# Patient Record
Sex: FEMALE | Race: WHITE | Employment: PART TIME | URBAN - METROPOLITAN AREA
[De-identification: names, ages, dates, MRNs, and addresses within clinical notes are randomized per-mention and may not be internally consistent; named-entity substitution may affect disease eponyms.]

---

## 2017-02-04 ENCOUNTER — TELEPHONE (OUTPATIENT)
Dept: OBGYN CLINIC | Facility: CLINIC | Age: 22
End: 2017-02-04

## 2017-02-04 RX ORDER — NORETHINDRONE ACETATE AND ETHINYL ESTRADIOL 1.5-30(21)
1 KIT ORAL DAILY
Qty: 1 PACKAGE | Refills: 2 | Status: SHIPPED | OUTPATIENT
Start: 2017-02-04 | End: 2017-06-21

## 2017-02-04 NOTE — TELEPHONE ENCOUNTER
Paged by patient because she is out of birth control pills. Was supposed to start new packet 6 days ago. Has not had intercourse since her last withdrawal bleed 2 weeks ago. Pt also reports ongoing irregular bleeding and missing pills.    I advised pa

## 2017-03-18 ENCOUNTER — OFFICE VISIT (OUTPATIENT)
Dept: FAMILY MEDICINE CLINIC | Facility: CLINIC | Age: 22
End: 2017-03-18

## 2017-03-18 VITALS
BODY MASS INDEX: 40.67 KG/M2 | DIASTOLIC BLOOD PRESSURE: 82 MMHG | TEMPERATURE: 98 F | HEART RATE: 80 BPM | WEIGHT: 221 LBS | SYSTOLIC BLOOD PRESSURE: 130 MMHG | HEIGHT: 62 IN

## 2017-03-18 DIAGNOSIS — B07.9 WARTS OF FOOT: Primary | ICD-10-CM

## 2017-03-18 PROCEDURE — 99213 OFFICE O/P EST LOW 20 MIN: CPT | Performed by: FAMILY MEDICINE

## 2017-03-18 NOTE — PATIENT INSTRUCTIONS
Understanding Plantar Warts    A plantar wart is a small noncancerous growth on the bottom of the foot. Plantar warts often develop where friction or pressure occurs, such as on the ball of the foot. The word plantar refers to the sole of the foot.  Joe Often your healthcare provider will cut away dead parts of the wart before also using one of these other treatments.    When should I call my healthcare provider?   Call your healthcare provider if you have plantar warts that become too painful and do not g · medicines that treat or prevent blood clots like warfarin  · methotrexate  · pyrazinamide  · some medicines for diabetes  · some medicines for gout  · steroid medicines like prednisone or cortisone  What if I miss a dose?   If you miss a dose, use it as s NOTE:This sheet is a summary. It may not cover all possible information. If you have questions about this medicine, talk to your doctor, pharmacist, or health care provider.  Copyright© 2016 [employer]

## 2017-03-18 NOTE — PROGRESS NOTES
HPI:    Patient ID: Concetta Hayden is a 24year old female. Warts  This is a chronic problem. The current episode started more than 1 year ago. The problem occurs constantly. The problem has been unchanged. Associated symptoms include a rash.  Pertinent

## 2017-04-29 ENCOUNTER — OFFICE VISIT (OUTPATIENT)
Dept: FAMILY MEDICINE CLINIC | Facility: CLINIC | Age: 22
End: 2017-04-29

## 2017-04-29 VITALS
TEMPERATURE: 98 F | WEIGHT: 223 LBS | HEART RATE: 81 BPM | BODY MASS INDEX: 41 KG/M2 | DIASTOLIC BLOOD PRESSURE: 87 MMHG | SYSTOLIC BLOOD PRESSURE: 129 MMHG

## 2017-04-29 DIAGNOSIS — J01.00 ACUTE MAXILLARY SINUSITIS, RECURRENCE NOT SPECIFIED: Primary | ICD-10-CM

## 2017-04-29 PROCEDURE — 99213 OFFICE O/P EST LOW 20 MIN: CPT | Performed by: FAMILY MEDICINE

## 2017-04-29 PROCEDURE — 99212 OFFICE O/P EST SF 10 MIN: CPT | Performed by: FAMILY MEDICINE

## 2017-04-29 RX ORDER — AMOXICILLIN AND CLAVULANATE POTASSIUM 875; 125 MG/1; MG/1
1 TABLET, FILM COATED ORAL 2 TIMES DAILY
Qty: 14 TABLET | Refills: 0 | Status: SHIPPED | OUTPATIENT
Start: 2017-04-29 | End: 2017-05-09

## 2017-04-29 NOTE — PROGRESS NOTES
HPI:    Patient ID: Kiki Cuevas is a 24year old female. HPI  Patient presents with:  Cold: c/o sore throat, cough, and congestion      Review of Systems   Constitutional: Negative.     HENT: Positive for congestion, postnasal drip and sinus pressure

## 2017-06-21 ENCOUNTER — OFFICE VISIT (OUTPATIENT)
Dept: OBGYN CLINIC | Facility: CLINIC | Age: 22
End: 2017-06-21

## 2017-06-21 VITALS
HEART RATE: 80 BPM | BODY MASS INDEX: 42 KG/M2 | WEIGHT: 231 LBS | SYSTOLIC BLOOD PRESSURE: 123 MMHG | DIASTOLIC BLOOD PRESSURE: 85 MMHG

## 2017-06-21 DIAGNOSIS — N91.2 AMENORRHEA: ICD-10-CM

## 2017-06-21 DIAGNOSIS — N92.6 IRREGULAR MENSES: Primary | ICD-10-CM

## 2017-06-21 PROCEDURE — 99213 OFFICE O/P EST LOW 20 MIN: CPT | Performed by: ADVANCED PRACTICE MIDWIFE

## 2017-06-21 PROCEDURE — 81025 URINE PREGNANCY TEST: CPT | Performed by: ADVANCED PRACTICE MIDWIFE

## 2017-06-21 RX ORDER — MEDROXYPROGESTERONE ACETATE 10 MG/1
10 TABLET ORAL DAILY
Qty: 5 TABLET | Refills: 0 | Status: SHIPPED | OUTPATIENT
Start: 2017-06-21 | End: 2017-06-26

## 2017-06-21 NOTE — PROGRESS NOTES
Pt is a 25 y/o who presents with amenorrhea x 7 weeks. Pt is currently not sexually active. Pt stopped taking OCPs in 2/17. OCPs were rx for irregular cycles. Pt has stopped seeing Dr Lucille Bryan for weight management.   Discussed with pt use of Provera for

## 2017-06-22 ENCOUNTER — APPOINTMENT (OUTPATIENT)
Dept: LAB | Age: 22
End: 2017-06-22
Attending: FAMILY MEDICINE
Payer: COMMERCIAL

## 2017-06-22 ENCOUNTER — TELEPHONE (OUTPATIENT)
Dept: OBGYN CLINIC | Facility: CLINIC | Age: 22
End: 2017-06-22

## 2017-06-22 ENCOUNTER — OFFICE VISIT (OUTPATIENT)
Dept: FAMILY MEDICINE CLINIC | Facility: CLINIC | Age: 22
End: 2017-06-22

## 2017-06-22 VITALS
BODY MASS INDEX: 34.56 KG/M2 | WEIGHT: 228 LBS | TEMPERATURE: 98 F | DIASTOLIC BLOOD PRESSURE: 92 MMHG | HEIGHT: 68 IN | SYSTOLIC BLOOD PRESSURE: 128 MMHG | HEART RATE: 77 BPM

## 2017-06-22 DIAGNOSIS — N91.1 AMENORRHEA, SECONDARY: Primary | ICD-10-CM

## 2017-06-22 DIAGNOSIS — L83 ACANTHOSIS NIGRICANS: ICD-10-CM

## 2017-06-22 DIAGNOSIS — E66.9 OBESITY (BMI 30-39.9): ICD-10-CM

## 2017-06-22 PROCEDURE — 99214 OFFICE O/P EST MOD 30 MIN: CPT | Performed by: FAMILY MEDICINE

## 2017-06-22 PROCEDURE — 99212 OFFICE O/P EST SF 10 MIN: CPT | Performed by: FAMILY MEDICINE

## 2017-06-22 NOTE — TELEPHONE ENCOUNTER
Left message to check if patient picked up Provera and if she has any questions on use. If patient calls back to take 10 mg x 5 days and then should expect menses within 2 weeks.   When menses occurs patient can call for an OCP rx that will regulate her cy

## 2017-06-22 NOTE — PROGRESS NOTES
HPI:    Patient ID: Jose Enrique Pérez is a 24year old female. HPI     Patient here for follow up and to discuss no menses.   Started ocps in oct 2016 and then stopped in Feb 2017 as she was having menses 2 x month    Also was feeling dizzy and nauseous on placed in this encounter.        Meds This Visit:  No prescriptions requested or ordered in this encounter    Imaging & Referrals:  None       CK#[id number]

## 2017-06-23 ENCOUNTER — APPOINTMENT (OUTPATIENT)
Dept: LAB | Age: 22
End: 2017-06-23
Attending: FAMILY MEDICINE
Payer: COMMERCIAL

## 2017-06-23 DIAGNOSIS — E66.9 OBESITY (BMI 30-39.9): ICD-10-CM

## 2017-06-23 DIAGNOSIS — N91.1 AMENORRHEA, SECONDARY: ICD-10-CM

## 2017-06-23 PROCEDURE — 80048 BASIC METABOLIC PNL TOTAL CA: CPT

## 2017-06-23 PROCEDURE — 84703 CHORIONIC GONADOTROPIN ASSAY: CPT

## 2017-06-23 PROCEDURE — 36415 COLL VENOUS BLD VENIPUNCTURE: CPT

## 2017-06-23 PROCEDURE — 83525 ASSAY OF INSULIN: CPT

## 2017-06-23 PROCEDURE — 83036 HEMOGLOBIN GLYCOSYLATED A1C: CPT

## 2017-06-23 PROCEDURE — 84443 ASSAY THYROID STIM HORMONE: CPT

## 2017-06-23 NOTE — TELEPHONE ENCOUNTER
Spoke to pt. She has not filled her provera rx. yet. She states she would like to wait a week or 2 just in case she gets her period on its own. Pt saw her pcp today. Pt aware of MES recs & orders.  Pt verbalizes an understanding & agrees w/ plan

## 2017-06-26 ENCOUNTER — TELEPHONE (OUTPATIENT)
Dept: FAMILY MEDICINE CLINIC | Facility: CLINIC | Age: 22
End: 2017-06-26

## 2017-06-26 PROBLEM — E88.81 INSULIN RESISTANCE: Status: ACTIVE | Noted: 2017-06-26

## 2017-06-26 PROBLEM — E88.819 INSULIN RESISTANCE: Status: ACTIVE | Noted: 2017-06-26

## 2017-06-28 NOTE — TELEPHONE ENCOUNTER
Spoke with patient (identified name and ), results reviewed and agrees with plan. Relayed ENDO number    Asking about what to do about amenorrhea-read OV notes

## 2017-06-29 NOTE — TELEPHONE ENCOUNTER
She was to take provera and then is supposed to have withdrawal bleeding. She is seeing [facility].  He can follow up with them or see gyne , Dr Watt/ DR Abreu/ DR Zackery Ventura

## 2017-07-12 NOTE — TELEPHONE ENCOUNTER
Pt informed of Dr Lit Gorman response below with understanding. Denies further questions/concerns at this time.

## 2017-08-14 ENCOUNTER — TELEPHONE (OUTPATIENT)
Dept: FAMILY MEDICINE CLINIC | Facility: CLINIC | Age: 22
End: 2017-08-14

## 2017-08-14 NOTE — TELEPHONE ENCOUNTER
pt stated that she was in on 6/22/17 since she had not had her period. She stated that you prescribed her Provera but she had inform you that she would wait a couple weeks to see if she would get her period the [address].  Pt stated that about 1 we

## 2017-08-15 NOTE — TELEPHONE ENCOUNTER
Called pt to clarify orders. Instructed pt not to take any medications, including provera. Advised pt that she needs to have quant bhcg drawn tonight or tomorrow. Order placed.  Stressed to pt that she is not to start provera until after speaking w/ our off

## 2017-08-15 NOTE — TELEPHONE ENCOUNTER
Pt states period is irregular and was prescribed pills  to get period. Pt states she waited a month before taking pills to see if period would come down regularly. Pt states it did but It was light and only lasted 2 days.  Pt wants to know if that is someth

## 2017-08-15 NOTE — TELEPHONE ENCOUNTER
Pt wants to know if she should start Provera d/t she only had 2 days of light bleeding last wk. Advised pt that MES want her to take Provera 10mg x 5 days then should expect menses within 2wks.  Advised that when her menses occur that she should call offic

## 2017-08-16 ENCOUNTER — APPOINTMENT (OUTPATIENT)
Dept: LAB | Facility: HOSPITAL | Age: 22
End: 2017-08-16
Attending: ADVANCED PRACTICE MIDWIFE
Payer: COMMERCIAL

## 2017-08-16 DIAGNOSIS — Z72.51 UNPROTECTED SEXUAL INTERCOURSE: ICD-10-CM

## 2017-08-16 LAB — B-HCG SERPL-ACNC: <0.6 MIU/ML

## 2017-08-16 PROCEDURE — 36415 COLL VENOUS BLD VENIPUNCTURE: CPT

## 2017-08-16 PROCEDURE — 84702 CHORIONIC GONADOTROPIN TEST: CPT

## 2017-08-18 ENCOUNTER — TELEPHONE (OUTPATIENT)
Dept: OBGYN CLINIC | Facility: CLINIC | Age: 22
End: 2017-08-18

## 2017-08-18 NOTE — TELEPHONE ENCOUNTER
Spoke with patient informed per MES pregnancy test is negative and is ok to start provera, patient verbalized understanding and agrees with plan.

## 2017-11-02 ENCOUNTER — LAB REQUISITION (OUTPATIENT)
Dept: LAB | Facility: HOSPITAL | Age: 22
End: 2017-11-02
Payer: COMMERCIAL

## 2017-11-02 DIAGNOSIS — N72 INFLAMMATORY DISEASE OF UTERINE CERVIX: ICD-10-CM

## 2017-11-02 PROCEDURE — 87491 CHLMYD TRACH DNA AMP PROBE: CPT | Performed by: OBSTETRICS & GYNECOLOGY

## 2017-11-02 PROCEDURE — 87591 N.GONORRHOEAE DNA AMP PROB: CPT | Performed by: OBSTETRICS & GYNECOLOGY

## 2017-11-03 ENCOUNTER — TELEPHONE (OUTPATIENT)
Dept: OBGYN CLINIC | Facility: CLINIC | Age: 22
End: 2017-11-03

## 2017-11-03 NOTE — TELEPHONE ENCOUNTER
The pt has questions regarding b/c medications. The pt is also requesting that the results from her last pap smear be faxed to Dr CELIA Campuzano at [phone]. Please advise.

## 2017-11-03 NOTE — TELEPHONE ENCOUNTER
Pt req name of meds she was on previously. Took Provera in June & was on Microgestin 1.5/30.  Pap results faxed to [facility] per pt request. Pt verbalized an understanding & agrees w/ plan

## 2017-11-09 ENCOUNTER — LAB REQUISITION (OUTPATIENT)
Dept: LAB | Facility: HOSPITAL | Age: 22
End: 2017-11-09
Payer: COMMERCIAL

## 2017-11-09 DIAGNOSIS — Z11.3 ENCOUNTER FOR SCREENING FOR INFECTIONS WITH PREDOMINANTLY SEXUAL MODE OF TRANSMISSION: ICD-10-CM

## 2017-11-09 DIAGNOSIS — N91.2 AMENORRHEA: ICD-10-CM

## 2017-11-09 PROCEDURE — 84443 ASSAY THYROID STIM HORMONE: CPT | Performed by: OBSTETRICS & GYNECOLOGY

## 2017-11-09 PROCEDURE — 87591 N.GONORRHOEAE DNA AMP PROB: CPT | Performed by: OBSTETRICS & GYNECOLOGY

## 2017-11-09 PROCEDURE — 84146 ASSAY OF PROLACTIN: CPT | Performed by: OBSTETRICS & GYNECOLOGY

## 2017-11-09 PROCEDURE — 87491 CHLMYD TRACH DNA AMP PROBE: CPT | Performed by: OBSTETRICS & GYNECOLOGY

## 2017-11-09 NOTE — TELEPHONE ENCOUNTER
Pt req names of ocp & provera & date of last pap. Previously received info but misplaced. Has appt w/ [facility] today. Info giv.  Pt verbalized an understanding & agrees w/ plan

## 2018-08-08 ENCOUNTER — LAB REQUISITION (OUTPATIENT)
Dept: LAB | Facility: HOSPITAL | Age: 23
End: 2018-08-08
Payer: COMMERCIAL

## 2018-08-08 DIAGNOSIS — E28.2 POLYCYSTIC OVARIAN SYNDROME: ICD-10-CM

## 2018-08-08 DIAGNOSIS — Z32.01 ENCOUNTER FOR PREGNANCY TEST, RESULT POSITIVE: ICD-10-CM

## 2018-08-08 LAB — RUBV IGG SER-ACNC: 29.3 IU/ML

## 2018-08-08 PROCEDURE — 86780 TREPONEMA PALLIDUM: CPT | Performed by: OBSTETRICS & GYNECOLOGY

## 2018-08-08 PROCEDURE — 87340 HEPATITIS B SURFACE AG IA: CPT | Performed by: OBSTETRICS & GYNECOLOGY

## 2018-08-08 PROCEDURE — 87389 HIV-1 AG W/HIV-1&-2 AB AG IA: CPT | Performed by: OBSTETRICS & GYNECOLOGY

## 2018-08-08 PROCEDURE — 86762 RUBELLA ANTIBODY: CPT | Performed by: OBSTETRICS & GYNECOLOGY

## 2018-08-09 LAB
HBV SURFACE AG SERPL QL IA: NONREACTIVE
HIV1+2 AB SERPL QL IA: NONREACTIVE

## 2018-08-10 LAB — T PALLIDUM AB SER QL: NEGATIVE

## 2018-08-14 ENCOUNTER — LAB REQUISITION (OUTPATIENT)
Dept: LAB | Facility: HOSPITAL | Age: 23
End: 2018-08-14
Payer: COMMERCIAL

## 2018-08-14 DIAGNOSIS — Z11.3 ENCOUNTER FOR SCREENING FOR INFECTIONS WITH PREDOMINANTLY SEXUAL MODE OF TRANSMISSION: ICD-10-CM

## 2018-08-14 DIAGNOSIS — N92.6 IRREGULAR MENSTRUATION: ICD-10-CM

## 2018-08-14 LAB
ANTIBODY SCREEN: NEGATIVE
BASOPHILS # BLD: 0.1 K/UL (ref 0–0.2)
BASOPHILS NFR BLD: 1 %
EOSINOPHIL # BLD: 0.1 K/UL (ref 0–0.7)
EOSINOPHIL NFR BLD: 2 %
ERYTHROCYTE [DISTWIDTH] IN BLOOD BY AUTOMATED COUNT: 13.3 % (ref 11–15)
HCT VFR BLD AUTO: 41.6 % (ref 35–48)
HGB BLD-MCNC: 14.1 G/DL (ref 12–16)
LYMPHOCYTES # BLD: 2 K/UL (ref 1–4)
LYMPHOCYTES NFR BLD: 21 %
MCH RBC QN AUTO: 31.5 PG (ref 27–32)
MCHC RBC AUTO-ENTMCNC: 33.9 G/DL (ref 32–37)
MCV RBC AUTO: 92.9 FL (ref 80–100)
MONOCYTES # BLD: 0.6 K/UL (ref 0–1)
MONOCYTES NFR BLD: 6 %
NEUTROPHILS # BLD AUTO: 6.8 K/UL (ref 1.8–7.7)
NEUTROPHILS NFR BLD: 70 %
PLATELET # BLD AUTO: 268 K/UL (ref 140–400)
PMV BLD AUTO: 11.2 FL (ref 7.4–10.3)
RBC # BLD AUTO: 4.48 M/UL (ref 3.7–5.4)
RH BLOOD TYPE: POSITIVE
WBC # BLD AUTO: 9.7 K/UL (ref 4–11)

## 2018-08-14 PROCEDURE — 87491 CHLMYD TRACH DNA AMP PROBE: CPT | Performed by: OBSTETRICS & GYNECOLOGY

## 2018-08-14 PROCEDURE — 86850 RBC ANTIBODY SCREEN: CPT | Performed by: OBSTETRICS & GYNECOLOGY

## 2018-08-14 PROCEDURE — 86901 BLOOD TYPING SEROLOGIC RH(D): CPT | Performed by: OBSTETRICS & GYNECOLOGY

## 2018-08-14 PROCEDURE — 86900 BLOOD TYPING SEROLOGIC ABO: CPT | Performed by: OBSTETRICS & GYNECOLOGY

## 2018-08-14 PROCEDURE — 87591 N.GONORRHOEAE DNA AMP PROB: CPT | Performed by: OBSTETRICS & GYNECOLOGY

## 2018-08-14 PROCEDURE — 85025 COMPLETE CBC W/AUTO DIFF WBC: CPT | Performed by: OBSTETRICS & GYNECOLOGY

## 2018-08-14 PROCEDURE — 83036 HEMOGLOBIN GLYCOSYLATED A1C: CPT | Performed by: OBSTETRICS & GYNECOLOGY

## 2018-08-15 LAB
C TRACH DNA SPEC QL NAA+PROBE: NEGATIVE
HBA1C MFR BLD: 5.6 % (ref 4–6)
N GONORRHOEA DNA SPEC QL NAA+PROBE: NEGATIVE

## 2018-09-13 ENCOUNTER — LAB REQUISITION (OUTPATIENT)
Dept: LAB | Facility: HOSPITAL | Age: 23
End: 2018-09-13
Payer: COMMERCIAL

## 2018-09-13 LAB — GLUCOSE 1H P 50 G GLC PO SERPL-MCNC: 153 MG/DL

## 2018-09-13 PROCEDURE — 82950 GLUCOSE TEST: CPT | Performed by: OBSTETRICS & GYNECOLOGY

## 2018-09-17 ENCOUNTER — LAB REQUISITION (OUTPATIENT)
Dept: LAB | Facility: HOSPITAL | Age: 23
End: 2018-09-17
Payer: COMMERCIAL

## 2018-09-17 DIAGNOSIS — O99.810 ABNORMAL GLUCOSE COMPLICATING PREGNANCY: ICD-10-CM

## 2018-09-17 LAB
GLUCOSE 1H P GLC SERPL-MCNC: 150 MG/DL
GLUCOSE 2H P GLC SERPL-MCNC: 167 MG/DL
GLUCOSE 3H P GLC SERPL-MCNC: 116 MG/DL
GLUCOSE P FAST SERPL-MCNC: 83 MG/DL (ref 70–99)

## 2018-09-17 PROCEDURE — 82952 GTT-ADDED SAMPLES: CPT | Performed by: OBSTETRICS & GYNECOLOGY

## 2018-09-17 PROCEDURE — 82951 GLUCOSE TOLERANCE TEST (GTT): CPT | Performed by: OBSTETRICS & GYNECOLOGY

## 2018-10-08 ENCOUNTER — LAB REQUISITION (OUTPATIENT)
Dept: LAB | Facility: HOSPITAL | Age: 23
End: 2018-10-08
Payer: COMMERCIAL

## 2018-10-08 DIAGNOSIS — Z34.01 ENCOUNTER FOR SUPERVISION OF NORMAL FIRST PREGNANCY IN FIRST TRIMESTER: ICD-10-CM

## 2018-10-08 PROCEDURE — 87086 URINE CULTURE/COLONY COUNT: CPT | Performed by: OBSTETRICS & GYNECOLOGY

## 2018-11-07 ENCOUNTER — HOSPITAL ENCOUNTER (OUTPATIENT)
Age: 23
Discharge: HOME | End: 2018-11-07
Payer: COMMERCIAL

## 2018-11-07 ENCOUNTER — APPOINTMENT (OUTPATIENT)
Dept: ULTRASOUND IMAGING | Facility: HOSPITAL | Age: 23
End: 2018-11-07
Attending: NURSE PRACTITIONER
Payer: COMMERCIAL

## 2018-11-07 ENCOUNTER — HOSPITAL ENCOUNTER (EMERGENCY)
Facility: HOSPITAL | Age: 23
Discharge: HOME | End: 2018-11-07
Payer: COMMERCIAL

## 2018-11-07 VITALS
OXYGEN SATURATION: 100 % | RESPIRATION RATE: 14 BRPM | BODY MASS INDEX: 42.48 KG/M2 | HEART RATE: 95 BPM | DIASTOLIC BLOOD PRESSURE: 80 MMHG | TEMPERATURE: 98 F | WEIGHT: 225 LBS | SYSTOLIC BLOOD PRESSURE: 117 MMHG | HEIGHT: 61 IN

## 2018-11-07 VITALS
HEART RATE: 109 BPM | BODY MASS INDEX: 42.48 KG/M2 | WEIGHT: 225 LBS | HEIGHT: 61 IN | RESPIRATION RATE: 20 BRPM | TEMPERATURE: 98 F | OXYGEN SATURATION: 98 % | DIASTOLIC BLOOD PRESSURE: 84 MMHG | SYSTOLIC BLOOD PRESSURE: 134 MMHG

## 2018-11-07 DIAGNOSIS — R06.00 DYSPNEA, UNSPECIFIED TYPE: Primary | ICD-10-CM

## 2018-11-07 DIAGNOSIS — Z34.92 SECOND TRIMESTER PREGNANCY: ICD-10-CM

## 2018-11-07 DIAGNOSIS — Z3A.18 18 WEEKS GESTATION OF PREGNANCY: ICD-10-CM

## 2018-11-07 PROCEDURE — 85379 FIBRIN DEGRADATION QUANT: CPT | Performed by: NURSE PRACTITIONER

## 2018-11-07 PROCEDURE — 99213 OFFICE O/P EST LOW 20 MIN: CPT

## 2018-11-07 PROCEDURE — 93005 ELECTROCARDIOGRAM TRACING: CPT

## 2018-11-07 PROCEDURE — 93970 EXTREMITY STUDY: CPT | Performed by: NURSE PRACTITIONER

## 2018-11-07 PROCEDURE — 99285 EMERGENCY DEPT VISIT HI MDM: CPT

## 2018-11-07 PROCEDURE — 36415 COLL VENOUS BLD VENIPUNCTURE: CPT

## 2018-11-07 PROCEDURE — 80048 BASIC METABOLIC PNL TOTAL CA: CPT | Performed by: NURSE PRACTITIONER

## 2018-11-07 PROCEDURE — 85025 COMPLETE CBC W/AUTO DIFF WBC: CPT | Performed by: NURSE PRACTITIONER

## 2018-11-07 PROCEDURE — 99202 OFFICE O/P NEW SF 15 MIN: CPT

## 2018-11-07 PROCEDURE — 93010 ELECTROCARDIOGRAM REPORT: CPT | Performed by: INTERNAL MEDICINE

## 2018-11-07 NOTE — ED NOTES
Pt to ER with c/o SOB for several days that has been accompanied by some left sided chest pain today and some anxiety. There is no cough, fever or upper respiratory infection. Denies any pain when taking a deep breath or moving.   Monitor shows ST without

## 2018-11-07 NOTE — ED PROVIDER NOTES
Patient presents with:  Dyspnea BELEM SOB (respiratory)      HPI:     This 21year old female presents with a chief complaint of intermittent shortness of breath for this past week. The patient states the symptoms have gradually become worse.   She states sh Occupational History      Not on file    Tobacco Use      Smoking status: Never Smoker      Smokeless tobacco: Never Used    Substance and Sexual Activity      Alcohol use: No        Alcohol/week: 0.0 oz      Drug use: No      Sexual activity: Yes        P and being tachycardic, I will send her to the emergency department for further evaluation. She declines an ambulance. She is with her family, and they are comfortable driving her to [facility].    Diagnosis:    ICD-10-CM    1.  Dyspnea, unspecified type R06

## 2018-11-07 NOTE — ED PROVIDER NOTES
Patient Seen in: [facility] Emergency Department    History   CC: sob  HPI: Pardeep Blower 21year old female  who presents to the ER c/o shortness of breath and difficulty taking a full deep breath which has been present for the past approximate reviewed from today and agreed except as otherwise stated in HPI. Medications :   ASPIRIN OR,  Take by mouth. Prenatal Multivit-Min-Fe-FA (PRENATAL OR),  Take by mouth. Constitutional and vital signs reviewed.         Physical Exam     ED Tri components:    D-Dimer 0.77 (*)     All other components within normal limits   CBC W/ DIFFERENTIAL - Abnormal; Notable for the following components:    WBC 14.1 (*)     MPV 10.9 (*)     Neutrophil Absolute 10.6 (*)     All other components within normal l days        Medications Prescribed:  Current Discharge Medication List

## 2018-11-13 ENCOUNTER — LAB REQUISITION (OUTPATIENT)
Dept: LAB | Facility: HOSPITAL | Age: 23
End: 2018-11-13
Payer: COMMERCIAL

## 2018-11-13 DIAGNOSIS — Z36.9 ENCOUNTER FOR ANTENATAL SCREENING: ICD-10-CM

## 2018-11-13 PROCEDURE — 81003 URINALYSIS AUTO W/O SCOPE: CPT | Performed by: OBSTETRICS & GYNECOLOGY

## 2019-01-04 ENCOUNTER — LAB REQUISITION (OUTPATIENT)
Dept: LAB | Facility: HOSPITAL | Age: 24
End: 2019-01-04
Payer: COMMERCIAL

## 2019-01-04 DIAGNOSIS — O99.810 ABNORMAL GLUCOSE COMPLICATING PREGNANCY: ICD-10-CM

## 2019-01-04 LAB
GLUCOSE 1H P GLC SERPL-MCNC: 157 MG/DL
GLUCOSE 2H P GLC SERPL-MCNC: 132 MG/DL
GLUCOSE 3H P GLC SERPL-MCNC: 109 MG/DL
GLUCOSE P FAST SERPL-MCNC: 78 MG/DL (ref 70–99)

## 2019-01-04 PROCEDURE — 82952 GTT-ADDED SAMPLES: CPT | Performed by: OBSTETRICS & GYNECOLOGY

## 2019-01-04 PROCEDURE — 82951 GLUCOSE TOLERANCE TEST (GTT): CPT | Performed by: OBSTETRICS & GYNECOLOGY

## 2019-01-12 ENCOUNTER — LAB REQUISITION (OUTPATIENT)
Dept: LAB | Facility: HOSPITAL | Age: 24
End: 2019-01-12
Payer: COMMERCIAL

## 2019-01-12 DIAGNOSIS — Z13.0 ENCOUNTER FOR SCREENING FOR DISEASES OF THE BLOOD AND BLOOD-FORMING ORGANS AND CERTAIN DISORDERS INVOLVING THE IMMUNE MECHANISM: ICD-10-CM

## 2019-01-12 LAB
BASOPHILS # BLD: 0 K/UL (ref 0–0.2)
BASOPHILS NFR BLD: 0 %
EOSINOPHIL # BLD: 0.1 K/UL (ref 0–0.7)
EOSINOPHIL NFR BLD: 1 %
ERYTHROCYTE [DISTWIDTH] IN BLOOD BY AUTOMATED COUNT: 12.9 % (ref 11–15)
HCT VFR BLD AUTO: 36.3 % (ref 35–48)
HGB BLD-MCNC: 12.2 G/DL (ref 12–16)
LYMPHOCYTES # BLD: 1.6 K/UL (ref 1–4)
LYMPHOCYTES NFR BLD: 16 %
MCH RBC QN AUTO: 31.2 PG (ref 27–32)
MCHC RBC AUTO-ENTMCNC: 33.6 G/DL (ref 32–37)
MCV RBC AUTO: 92.7 FL (ref 80–100)
MONOCYTES # BLD: 0.7 K/UL (ref 0–1)
MONOCYTES NFR BLD: 7 %
NEUTROPHILS # BLD AUTO: 7.7 K/UL (ref 1.8–7.7)
NEUTROPHILS NFR BLD: 76 %
PLATELET # BLD AUTO: 256 K/UL (ref 140–400)
PMV BLD AUTO: 10.4 FL (ref 7.4–10.3)
RBC # BLD AUTO: 3.91 M/UL (ref 3.7–5.4)
WBC # BLD AUTO: 10.1 K/UL (ref 4–11)

## 2019-01-12 PROCEDURE — 85025 COMPLETE CBC W/AUTO DIFF WBC: CPT | Performed by: OBSTETRICS & GYNECOLOGY

## 2019-02-11 ENCOUNTER — HOSPITAL ENCOUNTER (OUTPATIENT)
Facility: HOSPITAL | Age: 24
Setting detail: OBSERVATION
Discharge: HOME | End: 2019-02-12
Attending: OBSTETRICS & GYNECOLOGY | Admitting: OBSTETRICS & GYNECOLOGY
Payer: COMMERCIAL

## 2019-02-11 PROBLEM — Z34.90 PREGNANCY (HCC): Status: ACTIVE | Noted: 2019-02-11

## 2019-02-11 PROBLEM — Z34.90 PREGNANCY: Status: ACTIVE | Noted: 2019-02-11

## 2019-02-11 LAB
BILIRUB UR QL: NEGATIVE
COLOR UR: YELLOW
FIBRONECTIN FETAL SPEC QL: NEGATIVE
GLUCOSE UR-MCNC: NEGATIVE MG/DL
HGB UR QL STRIP.AUTO: NEGATIVE
KETONES UR-MCNC: 20 MG/DL
NITRITE UR QL STRIP.AUTO: NEGATIVE
PH UR: 7 [PH] (ref 5–8)
PROT UR-MCNC: NEGATIVE MG/DL
RBC #/AREA URNS AUTO: 2 /HPF
SP GR UR STRIP: 1.01 (ref 1–1.03)
UROBILINOGEN UR STRIP-ACNC: <2
VIT C UR-MCNC: NEGATIVE MG/DL
WBC #/AREA URNS AUTO: 4 /HPF

## 2019-02-11 PROCEDURE — 87653 STREP B DNA AMP PROBE: CPT | Performed by: OBSTETRICS & GYNECOLOGY

## 2019-02-11 PROCEDURE — 96372 THER/PROPH/DIAG INJ SC/IM: CPT

## 2019-02-11 PROCEDURE — 82731 ASSAY OF FETAL FIBRONECTIN: CPT | Performed by: OBSTETRICS & GYNECOLOGY

## 2019-02-11 PROCEDURE — 81001 URINALYSIS AUTO W/SCOPE: CPT | Performed by: OBSTETRICS & GYNECOLOGY

## 2019-02-11 PROCEDURE — 87081 CULTURE SCREEN ONLY: CPT | Performed by: OBSTETRICS & GYNECOLOGY

## 2019-02-11 PROCEDURE — 96360 HYDRATION IV INFUSION INIT: CPT

## 2019-02-11 RX ORDER — SODIUM CHLORIDE, SODIUM LACTATE, POTASSIUM CHLORIDE, CALCIUM CHLORIDE 600; 310; 30; 20 MG/100ML; MG/100ML; MG/100ML; MG/100ML
INJECTION, SOLUTION INTRAVENOUS
Status: COMPLETED
Start: 2019-02-11 | End: 2019-02-11

## 2019-02-11 RX ORDER — SODIUM CHLORIDE 0.9 % (FLUSH) 0.9 %
2 SYRINGE (ML) INJECTION AS NEEDED
Status: DISCONTINUED | OUTPATIENT
Start: 2019-02-11 | End: 2019-02-12

## 2019-02-11 RX ORDER — TERBUTALINE SULFATE 1 MG/ML
0.25 INJECTION, SOLUTION SUBCUTANEOUS
Status: ACTIVE | OUTPATIENT
Start: 2019-02-11 | End: 2019-02-11

## 2019-02-11 RX ORDER — 0.9 % SODIUM CHLORIDE 0.9 %
3 VIAL (ML) INJECTION AS NEEDED
Status: DISCONTINUED | OUTPATIENT
Start: 2019-02-11 | End: 2019-02-12

## 2019-02-11 RX ORDER — TERBUTALINE SULFATE 1 MG/ML
INJECTION, SOLUTION SUBCUTANEOUS
Status: COMPLETED
Start: 2019-02-11 | End: 2019-02-11

## 2019-02-11 RX ORDER — SODIUM CHLORIDE 0.9 % (FLUSH) 0.9 %
2 SYRINGE (ML) INJECTION EVERY 8 HOURS
Status: DISCONTINUED | OUTPATIENT
Start: 2019-02-11 | End: 2019-02-12

## 2019-02-12 VITALS — HEART RATE: 104 BPM | SYSTOLIC BLOOD PRESSURE: 135 MMHG | OXYGEN SATURATION: 98 % | DIASTOLIC BLOOD PRESSURE: 77 MMHG

## 2019-02-12 PROCEDURE — 59025 FETAL NON-STRESS TEST: CPT

## 2019-02-12 PROCEDURE — 99213 OFFICE O/P EST LOW 20 MIN: CPT

## 2019-02-12 NOTE — H&P
Quentin Prasad 76 Patient Status:  Observation    8/15/1995 MRN [id number]   Location [address]  Attending Chrissie Lee MD   [facility] Day # 0 PCP John Polanco MD     Date of Ad auscultation bilaterally  Heart: regular rate and rhythm, no murmur  Abdomen: estimated fetal weight: AGA  Cervix:  LTC in office      Fetal Surveillance:  Fetal heart variability: moderate  Fetal Heart Rate decelerations: none  Fetal Heart Rate accelerati

## 2019-02-12 NOTE — PROGRESS NOTES
Pt is a 21year old female admitted to TR3/TR3-A. Patient presents with:  Contractions     Pt is  32w3d intra-uterine pregnancy. History obtained, consents signed. Oriented to room, staff, and plan of care.

## 2019-02-12 NOTE — TRIAGE
[facility]      Triage Note    Pardeep Blower Patient Status:  Observation    8/15/1995 MRN [id number]   Location [address]  Attending Sagar Yousif MD   [facility] Day # 0 PCP MD Pauline Craig gestational age                                  Additional Comments       Reason for visit: pt to triage with c/o of contractions on NST at office. FFN-neg.  UA-neg. Bolus of IV fluid given and 2 doses of terbutaline given.   Pt discharged home with inst

## 2019-03-04 ENCOUNTER — LAB REQUISITION (OUTPATIENT)
Dept: LAB | Facility: HOSPITAL | Age: 24
End: 2019-03-04
Payer: COMMERCIAL

## 2019-03-04 DIAGNOSIS — Z36.9 ENCOUNTER FOR ANTENATAL SCREENING: ICD-10-CM

## 2019-03-04 PROCEDURE — 87081 CULTURE SCREEN ONLY: CPT | Performed by: OBSTETRICS & GYNECOLOGY

## 2019-03-04 PROCEDURE — 87653 STREP B DNA AMP PROBE: CPT | Performed by: OBSTETRICS & GYNECOLOGY

## 2019-03-11 ENCOUNTER — LAB REQUISITION (OUTPATIENT)
Dept: LAB | Facility: HOSPITAL | Age: 24
End: 2019-03-11
Payer: COMMERCIAL

## 2019-03-11 DIAGNOSIS — Z36.9 ENCOUNTER FOR ANTENATAL SCREENING: ICD-10-CM

## 2019-03-11 PROCEDURE — 87389 HIV-1 AG W/HIV-1&-2 AB AG IA: CPT | Performed by: OBSTETRICS & GYNECOLOGY

## 2019-03-11 PROCEDURE — 86780 TREPONEMA PALLIDUM: CPT | Performed by: OBSTETRICS & GYNECOLOGY

## 2019-03-13 LAB — T PALLIDUM AB SER QL: NEGATIVE

## 2019-03-25 ENCOUNTER — HOSPITAL ENCOUNTER (OUTPATIENT)
Facility: HOSPITAL | Age: 24
Setting detail: OBSERVATION
Discharge: HOME | End: 2019-03-25
Attending: OBSTETRICS & GYNECOLOGY | Admitting: OBSTETRICS & GYNECOLOGY
Payer: COMMERCIAL

## 2019-03-25 VITALS — HEART RATE: 77 BPM | DIASTOLIC BLOOD PRESSURE: 73 MMHG | SYSTOLIC BLOOD PRESSURE: 121 MMHG

## 2019-03-25 LAB
ALBUMIN SERPL-MCNC: 2.9 G/DL (ref 3.4–5)
ALBUMIN/GLOB SERPL: 0.6 {RATIO} (ref 1–2)
ALP LIVER SERPL-CCNC: 164 U/L (ref 52–144)
ALT SERPL-CCNC: 34 U/L (ref 13–56)
ALT SERPL-CCNC: 34 U/L (ref 13–56)
ANION GAP SERPL CALC-SCNC: 8 MMOL/L (ref 0–18)
AST SERPL-CCNC: 23 U/L (ref 15–37)
AST SERPL-CCNC: 23 U/L (ref 15–37)
BASOPHILS # BLD AUTO: 0.03 X10(3) UL (ref 0–0.2)
BASOPHILS NFR BLD AUTO: 0.3 %
BILIRUB SERPL-MCNC: 0.2 MG/DL (ref 0.1–2)
BUN BLD-MCNC: 10 MG/DL (ref 7–18)
BUN/CREAT SERPL: 14.7 (ref 10–20)
CALCIUM BLD-MCNC: 9.3 MG/DL (ref 8.5–10.1)
CHLORIDE SERPL-SCNC: 106 MMOL/L (ref 98–107)
CO2 SERPL-SCNC: 22 MMOL/L (ref 21–32)
CREAT BLD-MCNC: 0.68 MG/DL (ref 0.55–1.02)
CREAT UR-SCNC: 13.1 MG/DL
DEPRECATED RDW RBC AUTO: 42.8 FL (ref 35.1–46.3)
EOSINOPHIL # BLD AUTO: 0.08 X10(3) UL (ref 0–0.7)
EOSINOPHIL NFR BLD AUTO: 0.8 %
ERYTHROCYTE [DISTWIDTH] IN BLOOD BY AUTOMATED COUNT: 13 % (ref 11–15)
GLOBULIN PLAS-MCNC: 4.7 G/DL (ref 2.8–4.4)
GLUCOSE BLD-MCNC: 78 MG/DL (ref 70–99)
HCT VFR BLD AUTO: 37.3 % (ref 35–48)
HGB BLD-MCNC: 12.6 G/DL (ref 12–16)
IMM GRANULOCYTES # BLD AUTO: 0.03 X10(3) UL (ref 0–1)
IMM GRANULOCYTES NFR BLD: 0.3 %
LYMPHOCYTES # BLD AUTO: 1.53 X10(3) UL (ref 1–4)
LYMPHOCYTES NFR BLD AUTO: 15.3 %
M PROTEIN MFR SERPL ELPH: 7.6 G/DL (ref 6.4–8.2)
MCH RBC QN AUTO: 30.9 PG (ref 26–34)
MCHC RBC AUTO-ENTMCNC: 33.8 G/DL (ref 31–37)
MCV RBC AUTO: 91.4 FL (ref 80–100)
MONOCYTES # BLD AUTO: 0.76 X10(3) UL (ref 0.1–1)
MONOCYTES NFR BLD AUTO: 7.6 %
NEUTROPHILS # BLD AUTO: 7.59 X10 (3) UL (ref 1.5–7.7)
NEUTROPHILS # BLD AUTO: 7.59 X10(3) UL (ref 1.5–7.7)
NEUTROPHILS NFR BLD AUTO: 75.7 %
OSMOLALITY SERPL CALC.SUM OF ELEC: 280 MOSM/KG (ref 275–295)
PLATELET # BLD AUTO: 258 10(3)UL (ref 150–450)
POTASSIUM SERPL-SCNC: 3.9 MMOL/L (ref 3.5–5.1)
PROT UR-MCNC: <5 MG/DL
RBC # BLD AUTO: 4.08 X10(6)UL (ref 3.8–5.3)
SODIUM SERPL-SCNC: 136 MMOL/L (ref 136–145)
WBC # BLD AUTO: 10 X10(3) UL (ref 4–11)

## 2019-03-25 PROCEDURE — 59025 FETAL NON-STRESS TEST: CPT

## 2019-03-25 PROCEDURE — 99203 OFFICE O/P NEW LOW 30 MIN: CPT

## 2019-03-25 PROCEDURE — 82570 ASSAY OF URINE CREATININE: CPT | Performed by: OBSTETRICS & GYNECOLOGY

## 2019-03-25 PROCEDURE — 36415 COLL VENOUS BLD VENIPUNCTURE: CPT

## 2019-03-25 PROCEDURE — 84156 ASSAY OF PROTEIN URINE: CPT | Performed by: OBSTETRICS & GYNECOLOGY

## 2019-03-25 PROCEDURE — 80053 COMPREHEN METABOLIC PANEL: CPT | Performed by: OBSTETRICS & GYNECOLOGY

## 2019-03-25 PROCEDURE — 84460 ALANINE AMINO (ALT) (SGPT): CPT | Performed by: OBSTETRICS & GYNECOLOGY

## 2019-03-25 PROCEDURE — 85025 COMPLETE CBC W/AUTO DIFF WBC: CPT | Performed by: OBSTETRICS & GYNECOLOGY

## 2019-03-25 PROCEDURE — 84450 TRANSFERASE (AST) (SGOT): CPT | Performed by: OBSTETRICS & GYNECOLOGY

## 2019-03-25 NOTE — PROGRESS NOTES
Pt is a 21year old female admitted to TR4/TR4-A. Patient presents with:  Elevated BP     Pt is  38w3d intra-uterine pregnancy. History obtained, consents signed. Oriented to room, staff, and plan of care.

## 2019-03-25 NOTE — TRIAGE
[facility]      Triage Note    Josiah Cruz Patient Status:  Observation    8/15/1995 MRN [id number]   Location [address]  Attending Drea Frankel MD   [facility] Day # 0 PCP John Morris MD       Beau Gabe Baseline: 145 BPM           Uterine Irritability: No                                   Acoustic Stimulator: No           Nonstress Test Interpretation: Reactive           Nonstress Test Second Interpretation: Reactive          FHR Category: Category

## 2019-03-25 NOTE — TRIAGE
Sent from office for elevated BPs. NST reactive, initial BPs WNL. Awaiting labs and more BPs. If labs WNL and BP remain <140/90 will d/c home to f/u in office later this week as scheduled.

## 2019-04-10 ENCOUNTER — HOSPITAL ENCOUNTER (INPATIENT)
Facility: HOSPITAL | Age: 24
LOS: 5 days | Discharge: HOME | End: 2019-04-15
Attending: OBSTETRICS & GYNECOLOGY | Admitting: OBSTETRICS & GYNECOLOGY
Payer: COMMERCIAL

## 2019-04-10 ENCOUNTER — TELEPHONE (OUTPATIENT)
Dept: OBGYN UNIT | Facility: HOSPITAL | Age: 24
End: 2019-04-10

## 2019-04-10 ENCOUNTER — APPOINTMENT (OUTPATIENT)
Dept: OBGYN CLINIC | Facility: HOSPITAL | Age: 24
End: 2019-04-10
Attending: OBSTETRICS & GYNECOLOGY
Payer: COMMERCIAL

## 2019-04-10 PROBLEM — O48.0 POST TERM PREGNANCY AT 41 WEEKS GESTATION: Status: ACTIVE | Noted: 2019-04-10

## 2019-04-10 PROBLEM — Z3A.41 POST TERM PREGNANCY AT 41 WEEKS GESTATION (HCC): Status: ACTIVE | Noted: 2019-04-10

## 2019-04-10 PROBLEM — O48.0 POST TERM PREGNANCY AT 41 WEEKS GESTATION (HCC): Status: ACTIVE | Noted: 2019-04-10

## 2019-04-10 PROBLEM — Z3A.41 POST TERM PREGNANCY AT 41 WEEKS GESTATION: Status: ACTIVE | Noted: 2019-04-10

## 2019-04-10 RX ORDER — TERBUTALINE SULFATE 1 MG/ML
0.25 INJECTION, SOLUTION SUBCUTANEOUS AS NEEDED
Status: DISCONTINUED | OUTPATIENT
Start: 2019-04-10 | End: 2019-04-12 | Stop reason: HOSPADM

## 2019-04-10 RX ORDER — METOCLOPRAMIDE HYDROCHLORIDE 5 MG/ML
10 INJECTION INTRAMUSCULAR; INTRAVENOUS EVERY 6 HOURS PRN
Status: DISCONTINUED | OUTPATIENT
Start: 2019-04-10 | End: 2019-04-12

## 2019-04-10 RX ORDER — AMMONIA INHALANTS 0.04 G/.3ML
0.3 INHALANT RESPIRATORY (INHALATION) AS NEEDED
Status: DISCONTINUED | OUTPATIENT
Start: 2019-04-10 | End: 2019-04-12 | Stop reason: HOSPADM

## 2019-04-10 RX ORDER — DEXTROSE, SODIUM CHLORIDE, SODIUM LACTATE, POTASSIUM CHLORIDE, AND CALCIUM CHLORIDE 5; .6; .31; .03; .02 G/100ML; G/100ML; G/100ML; G/100ML; G/100ML
INJECTION, SOLUTION INTRAVENOUS CONTINUOUS
Status: DISCONTINUED | OUTPATIENT
Start: 2019-04-10 | End: 2019-04-12 | Stop reason: HOSPADM

## 2019-04-10 RX ORDER — SODIUM CHLORIDE 0.9 % (FLUSH) 0.9 %
10 SYRINGE (ML) INJECTION AS NEEDED
Status: DISCONTINUED | OUTPATIENT
Start: 2019-04-10 | End: 2019-04-12 | Stop reason: HOSPADM

## 2019-04-10 RX ORDER — IBUPROFEN 600 MG/1
600 TABLET ORAL ONCE AS NEEDED
Status: DISCONTINUED | OUTPATIENT
Start: 2019-04-10 | End: 2019-04-12 | Stop reason: HOSPADM

## 2019-04-10 RX ORDER — LIDOCAINE HYDROCHLORIDE 10 MG/ML
30 INJECTION, SOLUTION EPIDURAL; INFILTRATION; INTRACAUDAL; PERINEURAL ONCE
Status: DISCONTINUED | OUTPATIENT
Start: 2019-04-10 | End: 2019-04-12 | Stop reason: HOSPADM

## 2019-04-10 RX ORDER — TRISODIUM CITRATE DIHYDRATE AND CITRIC ACID MONOHYDRATE 500; 334 MG/5ML; MG/5ML
30 SOLUTION ORAL AS NEEDED
Status: DISCONTINUED | OUTPATIENT
Start: 2019-04-10 | End: 2019-04-12 | Stop reason: HOSPADM

## 2019-04-10 RX ORDER — NALBUPHINE HCL 10 MG/ML
10 AMPUL (ML) INJECTION
Status: DISCONTINUED | OUTPATIENT
Start: 2019-04-10 | End: 2019-04-12 | Stop reason: HOSPADM

## 2019-04-10 NOTE — PROGRESS NOTES
Pt is a 21year old female admitted to 377/377-A. Patient presents with:  Scheduled Induction: post dates IOL     Pt is  40w5d intra-uterine pregnancy. History obtained, consents signed. Oriented to room, staff, and plan of care.

## 2019-04-11 ENCOUNTER — ANESTHESIA EVENT (OUTPATIENT)
Dept: OBGYN UNIT | Facility: HOSPITAL | Age: 24
End: 2019-04-11
Payer: COMMERCIAL

## 2019-04-11 ENCOUNTER — ANESTHESIA (OUTPATIENT)
Dept: OBGYN UNIT | Facility: HOSPITAL | Age: 24
End: 2019-04-11
Payer: COMMERCIAL

## 2019-04-11 PROBLEM — O16.3 ELEVATED BLOOD PRESSURE COMPLICATING PREGNANCY, ANTEPARTUM, THIRD TRIMESTER (HCC): Status: ACTIVE | Noted: 2019-04-11

## 2019-04-11 PROBLEM — O16.3 ELEVATED BLOOD PRESSURE COMPLICATING PREGNANCY, ANTEPARTUM, THIRD TRIMESTER: Status: ACTIVE | Noted: 2019-04-11

## 2019-04-11 PROBLEM — E66.01 MORBID OBESITY WITH BMI OF 40.0-44.9, ADULT (HCC): Status: ACTIVE | Noted: 2019-04-11

## 2019-04-11 PROCEDURE — 10H073Z INSERTION OF MONITORING ELECTRODE INTO PRODUCTS OF CONCEPTION, VIA NATURAL OR ARTIFICIAL OPENING: ICD-10-PCS | Performed by: OBSTETRICS & GYNECOLOGY

## 2019-04-11 PROCEDURE — 4A1H74Z MONITORING OF PRODUCTS OF CONCEPTION, CARDIAC ELECTRICAL ACTIVITY, VIA NATURAL OR ARTIFICIAL OPENING: ICD-10-PCS | Performed by: OBSTETRICS & GYNECOLOGY

## 2019-04-11 RX ORDER — LIDOCAINE HYDROCHLORIDE 10 MG/ML
INJECTION, SOLUTION INFILTRATION; PERINEURAL
Status: COMPLETED | OUTPATIENT
Start: 2019-04-11 | End: 2019-04-11

## 2019-04-11 RX ORDER — EPHEDRINE SULFATE/0.9% NACL/PF 25 MG/5 ML
5 SYRINGE (ML) INTRAVENOUS AS NEEDED
Status: DISCONTINUED | OUTPATIENT
Start: 2019-04-11 | End: 2019-04-12

## 2019-04-11 RX ORDER — NALBUPHINE HCL 10 MG/ML
2.5 AMPUL (ML) INJECTION
Status: DISCONTINUED | OUTPATIENT
Start: 2019-04-11 | End: 2019-04-12

## 2019-04-11 RX ORDER — CEFAZOLIN SODIUM/WATER 2 G/20 ML
SYRINGE (ML) INTRAVENOUS
Status: DISCONTINUED
Start: 2019-04-11 | End: 2019-04-12 | Stop reason: WASHOUT

## 2019-04-11 RX ORDER — FAMOTIDINE 10 MG/ML
INJECTION, SOLUTION INTRAVENOUS
Status: DISPENSED
Start: 2019-04-11 | End: 2019-04-12

## 2019-04-11 RX ORDER — LIDOCAINE HYDROCHLORIDE AND EPINEPHRINE 20; 5 MG/ML; UG/ML
20 INJECTION, SOLUTION EPIDURAL; INFILTRATION; INTRACAUDAL; PERINEURAL ONCE
Status: COMPLETED | OUTPATIENT
Start: 2019-04-11 | End: 2019-04-12

## 2019-04-11 RX ORDER — BUPIVACAINE HYDROCHLORIDE 2.5 MG/ML
10 INJECTION, SOLUTION EPIDURAL; INFILTRATION; INTRACAUDAL ONCE
Status: DISCONTINUED | OUTPATIENT
Start: 2019-04-11 | End: 2019-04-12

## 2019-04-11 RX ORDER — LIDOCAINE HYDROCHLORIDE AND EPINEPHRINE 15; 5 MG/ML; UG/ML
INJECTION, SOLUTION EPIDURAL
Status: COMPLETED | OUTPATIENT
Start: 2019-04-11 | End: 2019-04-11

## 2019-04-11 RX ADMIN — LIDOCAINE HYDROCHLORIDE AND EPINEPHRINE 5 ML: 15; 5 INJECTION, SOLUTION EPIDURAL at 12:00:00

## 2019-04-11 RX ADMIN — LIDOCAINE HYDROCHLORIDE 5 ML: 10 INJECTION, SOLUTION INFILTRATION; PERINEURAL at 12:00:00

## 2019-04-11 NOTE — PROGRESS NOTES
[facility]    Labor Progress Note    Marysachi Aleman Patient Status:  Inpatient    8/15/1995 MRN [id number]   Location [address]  Attending Jasper Hercules MD   [facility] Day # 1 PCP John Hamilton MD       [place]

## 2019-04-11 NOTE — ANESTHESIA PREPROCEDURE EVALUATION
Anesthesia PreOp Note    HPI:     Mary Aleman is a 21year old female who presents for preoperative consultation requested by: * No surgeons listed *    Date of Surgery: 4/11/2019    * No procedures listed *  Indication: * No pre-op diagnosis entered * 1.25mg/ml epidural infusion  Epidural Continuous Jory Stanley MD     fentaNYL citrate (SUBLIMAZE) 0.05 MG/ML injection 100 mcg 100 mcg Epidural Once Jose Jimenez MD     EPHEDrine sulfate in NaCl 0.9% (PF) injection 5 mg 5 mg Intravenous PRN Jose Jimenez MD Allergies    Family History   Problem Relation Age of Onset   • Diabetes Paternal Grandfather    • Diabetes Paternal Grandmother    • Arrhythmia Other         /Sudden death under age 36 Neg Family H/O   • Cancer Neg    • Heart Disorder Neg    • Hypertensio Stress Concern: Not Asked        Weight Concern: Not Asked        Special Diet: Not Asked        Back Care: Not Asked        Exercise: Not Asked        Bike Helmet: Not Asked        Seat Belt: Not Asked        Self-Exams: Not Asked    Social History Na nature of the anesthetic plan, benefits, risks including possible dental damage if relevant, major complications, and any alternative forms of anesthetic management. All of the patient's questions were answered to the best of my ability.  The patient mack

## 2019-04-11 NOTE — PROGRESS NOTES
Dr. Henri Davalos at [facility] for removal of cooks, 80/80 removed. Pt c/o LOF at 0530, amnio and fern taken. SVE 3-4/90/-3.

## 2019-04-11 NOTE — H&P
Quentin Prasad 76 Patient Status:  Inpatient    8/15/1995 MRN [id number]   Location [address]  Attending Yolis Major MD   [facility] Day # 0 PCP John Polanco MD     Date of Admi • Hypertension Neg      Social History: Social History    Tobacco Use      Smoking status: Never Smoker      Smokeless tobacco: Never Used    Alcohol use: No      Alcohol/week: 0.0 oz        Home Meds:   Medications Prior to Admission:  ASPIRIN OR check CMP and urine prot:Cr    Morbid obesity   Borderline LGA        Risks, benefits, alternatives and possible complications have been discussed in detail with the patient.  All questions answered, all appropriate consents will be signed at the Hospital.

## 2019-04-11 NOTE — PROGRESS NOTES
Dr Sarahi De Jesus placed cooks catheter, balloons 60/60cc w/ normal saline. Pt tolerated well. Dr Sarahi De Jesus v/o to add additional 20/20cc in 30 mins.  Will ctm

## 2019-04-11 NOTE — PROGRESS NOTES
20/20cc added to cooks catheter balloons. Pt tolerated well. Total cc's now 80/80cc for balloons.  Will ctm

## 2019-04-11 NOTE — ANESTHESIA PROCEDURE NOTES
Labor Analgesia  Date/Time: 4/11/2019 12:00 PM  Performed by: Tono Reynoso MD  Authorized by: Tono Reynoso MD     Patient Location:  OB  Start Time:  4/11/2019 11:41 AM  End Time:  4/11/2019 12:03 PM  Site Identification: surface landmarks    Reason for Bl

## 2019-04-11 NOTE — PROGRESS NOTES
Pt feeling pressure. SVE: 5/100/-1  UCs q 3-4 min. Pit at 8 mU/min  [id number], mod variability  FSE and IUPC placed.  Epidural.

## 2019-04-11 NOTE — PROGRESS NOTES
No c/o  BP mildly elevated, otherwise VSS  FHTs 130  UCs q2  SVE: IUPC repositioned, 4/110/-2 to -3    CPM, repeat CBC/CMP

## 2019-04-12 PROBLEM — O14.90 PREECLAMPSIA: Status: ACTIVE | Noted: 2019-04-12

## 2019-04-12 PROBLEM — O14.90 PREECLAMPSIA (HCC): Status: ACTIVE | Noted: 2019-04-12

## 2019-04-12 PROBLEM — Z98.890 POST-OPERATIVE STATE: Status: ACTIVE | Noted: 2019-04-12

## 2019-04-12 PROCEDURE — 59514 CESAREAN DELIVERY ONLY: CPT | Performed by: OBSTETRICS & GYNECOLOGY

## 2019-04-12 RX ORDER — CLINDAMYCIN PHOSPHATE 900 MG/50ML
900 INJECTION INTRAVENOUS EVERY 8 HOURS
Status: DISCONTINUED | OUTPATIENT
Start: 2019-04-12 | End: 2019-04-14

## 2019-04-12 RX ORDER — DOCUSATE SODIUM 100 MG/1
100 CAPSULE, LIQUID FILLED ORAL
Status: DISCONTINUED | OUTPATIENT
Start: 2019-04-12 | End: 2019-04-15

## 2019-04-12 RX ORDER — METOCLOPRAMIDE HYDROCHLORIDE 5 MG/ML
10 INJECTION INTRAMUSCULAR; INTRAVENOUS ONCE
Status: COMPLETED | OUTPATIENT
Start: 2019-04-12 | End: 2019-04-12

## 2019-04-12 RX ORDER — MORPHINE SULFATE 1 MG/ML
INJECTION, SOLUTION EPIDURAL; INTRATHECAL; INTRAVENOUS AS NEEDED
Status: DISCONTINUED | OUTPATIENT
Start: 2019-04-12 | End: 2019-04-12 | Stop reason: SURG

## 2019-04-12 RX ORDER — SIMETHICONE 80 MG
80 TABLET,CHEWABLE ORAL 3 TIMES DAILY PRN
Status: DISCONTINUED | OUTPATIENT
Start: 2019-04-12 | End: 2019-04-15

## 2019-04-12 RX ORDER — ACETAMINOPHEN 325 MG/1
650 TABLET ORAL EVERY 6 HOURS PRN
Status: ACTIVE | OUTPATIENT
Start: 2019-04-12 | End: 2019-04-13

## 2019-04-12 RX ORDER — SODIUM CHLORIDE 0.9 % (FLUSH) 0.9 %
10 SYRINGE (ML) INJECTION AS NEEDED
Status: DISCONTINUED | OUTPATIENT
Start: 2019-04-12 | End: 2019-04-15

## 2019-04-12 RX ORDER — HYDROMORPHONE HYDROCHLORIDE 1 MG/ML
0.6 INJECTION, SOLUTION INTRAMUSCULAR; INTRAVENOUS; SUBCUTANEOUS
Status: ACTIVE | OUTPATIENT
Start: 2019-04-12 | End: 2019-04-13

## 2019-04-12 RX ORDER — HYDROCODONE BITARTRATE AND ACETAMINOPHEN 7.5; 325 MG/1; MG/1
2 TABLET ORAL EVERY 6 HOURS PRN
Status: ACTIVE | OUTPATIENT
Start: 2019-04-12 | End: 2019-04-13

## 2019-04-12 RX ORDER — METOCLOPRAMIDE 10 MG/1
10 TABLET ORAL ONCE
Status: COMPLETED | OUTPATIENT
Start: 2019-04-12 | End: 2019-04-12

## 2019-04-12 RX ORDER — DEXTROSE, SODIUM CHLORIDE, SODIUM LACTATE, POTASSIUM CHLORIDE, AND CALCIUM CHLORIDE 5; .6; .31; .03; .02 G/100ML; G/100ML; G/100ML; G/100ML; G/100ML
INJECTION, SOLUTION INTRAVENOUS CONTINUOUS
Status: DISCONTINUED | OUTPATIENT
Start: 2019-04-12 | End: 2019-04-15

## 2019-04-12 RX ORDER — NALOXONE HYDROCHLORIDE 0.4 MG/ML
0.08 INJECTION, SOLUTION INTRAMUSCULAR; INTRAVENOUS; SUBCUTANEOUS
Status: ACTIVE | OUTPATIENT
Start: 2019-04-12 | End: 2019-04-13

## 2019-04-12 RX ORDER — DIPHENHYDRAMINE HYDROCHLORIDE 50 MG/ML
12.5 INJECTION INTRAMUSCULAR; INTRAVENOUS EVERY 4 HOURS PRN
Status: ACTIVE | OUTPATIENT
Start: 2019-04-12 | End: 2019-04-13

## 2019-04-12 RX ORDER — ONDANSETRON 2 MG/ML
4 INJECTION INTRAMUSCULAR; INTRAVENOUS EVERY 6 HOURS PRN
Status: DISCONTINUED | OUTPATIENT
Start: 2019-04-12 | End: 2019-04-15

## 2019-04-12 RX ORDER — DIPHENHYDRAMINE HCL 25 MG
25 CAPSULE ORAL EVERY 4 HOURS PRN
Status: ACTIVE | OUTPATIENT
Start: 2019-04-12 | End: 2019-04-13

## 2019-04-12 RX ORDER — HYDROCODONE BITARTRATE AND ACETAMINOPHEN 7.5; 325 MG/1; MG/1
1 TABLET ORAL EVERY 6 HOURS PRN
Status: ACTIVE | OUTPATIENT
Start: 2019-04-12 | End: 2019-04-13

## 2019-04-12 RX ORDER — BISACODYL 10 MG
10 SUPPOSITORY, RECTAL RECTAL
Status: DISCONTINUED | OUTPATIENT
Start: 2019-04-12 | End: 2019-04-15

## 2019-04-12 RX ORDER — POLYETHYLENE GLYCOL 3350 17 G/17G
17 POWDER, FOR SOLUTION ORAL DAILY PRN
Status: DISCONTINUED | OUTPATIENT
Start: 2019-04-12 | End: 2019-04-15

## 2019-04-12 RX ORDER — SODIUM CHLORIDE, SODIUM LACTATE, POTASSIUM CHLORIDE, CALCIUM CHLORIDE 600; 310; 30; 20 MG/100ML; MG/100ML; MG/100ML; MG/100ML
INJECTION, SOLUTION INTRAVENOUS CONTINUOUS PRN
Status: DISCONTINUED | OUTPATIENT
Start: 2019-04-12 | End: 2019-04-12 | Stop reason: SURG

## 2019-04-12 RX ORDER — FAMOTIDINE 10 MG/ML
20 INJECTION, SOLUTION INTRAVENOUS ONCE
Status: COMPLETED | OUTPATIENT
Start: 2019-04-12 | End: 2019-04-12

## 2019-04-12 RX ORDER — TRISODIUM CITRATE DIHYDRATE AND CITRIC ACID MONOHYDRATE 500; 334 MG/5ML; MG/5ML
30 SOLUTION ORAL ONCE
Status: COMPLETED | OUTPATIENT
Start: 2019-04-12 | End: 2019-04-12

## 2019-04-12 RX ORDER — ONDANSETRON 2 MG/ML
4 INJECTION INTRAMUSCULAR; INTRAVENOUS ONCE AS NEEDED
Status: COMPLETED | OUTPATIENT
Start: 2019-04-12 | End: 2019-04-12

## 2019-04-12 RX ORDER — HYDROMORPHONE HYDROCHLORIDE 2 MG/1
2 TABLET ORAL
Status: DISCONTINUED | OUTPATIENT
Start: 2019-04-12 | End: 2019-04-15

## 2019-04-12 RX ORDER — HALOPERIDOL 5 MG/ML
0.5 INJECTION INTRAMUSCULAR ONCE AS NEEDED
Status: ACTIVE | OUTPATIENT
Start: 2019-04-12 | End: 2019-04-12

## 2019-04-12 RX ORDER — FAMOTIDINE 20 MG/1
20 TABLET ORAL ONCE
Status: COMPLETED | OUTPATIENT
Start: 2019-04-12 | End: 2019-04-12

## 2019-04-12 RX ORDER — SODIUM PHOSPHATE, DIBASIC AND SODIUM PHOSPHATE, MONOBASIC 7; 19 G/133ML; G/133ML
1 ENEMA RECTAL ONCE AS NEEDED
Status: DISCONTINUED | OUTPATIENT
Start: 2019-04-12 | End: 2019-04-15

## 2019-04-12 RX ORDER — AMMONIA INHALANTS 0.04 G/.3ML
0.3 INHALANT RESPIRATORY (INHALATION) AS NEEDED
Status: DISCONTINUED | OUTPATIENT
Start: 2019-04-12 | End: 2019-04-15

## 2019-04-12 RX ORDER — NALBUPHINE HCL 10 MG/ML
2.5 AMPUL (ML) INJECTION EVERY 4 HOURS PRN
Status: ACTIVE | OUTPATIENT
Start: 2019-04-12 | End: 2019-04-13

## 2019-04-12 RX ORDER — HYDROMORPHONE HYDROCHLORIDE 1 MG/ML
0.4 INJECTION, SOLUTION INTRAMUSCULAR; INTRAVENOUS; SUBCUTANEOUS
Status: ACTIVE | OUTPATIENT
Start: 2019-04-12 | End: 2019-04-13

## 2019-04-12 RX ADMIN — SODIUM CHLORIDE, SODIUM LACTATE, POTASSIUM CHLORIDE, CALCIUM CHLORIDE: 600; 310; 30; 20 INJECTION, SOLUTION INTRAVENOUS at 02:45:00

## 2019-04-12 RX ADMIN — SODIUM CHLORIDE, SODIUM LACTATE, POTASSIUM CHLORIDE, CALCIUM CHLORIDE: 600; 310; 30; 20 INJECTION, SOLUTION INTRAVENOUS at 03:31:00

## 2019-04-12 RX ADMIN — MORPHINE SULFATE 3 MG: 1 INJECTION, SOLUTION EPIDURAL; INTRATHECAL; INTRAVENOUS at 02:34:00

## 2019-04-12 RX ADMIN — SODIUM CHLORIDE, SODIUM LACTATE, POTASSIUM CHLORIDE, CALCIUM CHLORIDE: 600; 310; 30; 20 INJECTION, SOLUTION INTRAVENOUS at 01:07:00

## 2019-04-12 RX ADMIN — LIDOCAINE HYDROCHLORIDE AND EPINEPHRINE 2 ML: 20; 5 INJECTION, SOLUTION EPIDURAL; INFILTRATION; INTRACAUDAL; PERINEURAL at 02:34:00

## 2019-04-12 RX ADMIN — LIDOCAINE HYDROCHLORIDE AND EPINEPHRINE 3 ML: 20; 5 INJECTION, SOLUTION EPIDURAL; INFILTRATION; INTRACAUDAL; PERINEURAL at 02:28:00

## 2019-04-12 RX ADMIN — LIDOCAINE HYDROCHLORIDE AND EPINEPHRINE 5 ML: 20; 5 INJECTION, SOLUTION EPIDURAL; INFILTRATION; INTRACAUDAL; PERINEURAL at 02:30:00

## 2019-04-12 RX ADMIN — LIDOCAINE HYDROCHLORIDE AND EPINEPHRINE 5 ML: 20; 5 INJECTION, SOLUTION EPIDURAL; INFILTRATION; INTRACAUDAL; PERINEURAL at 02:32:00

## 2019-04-12 NOTE — PROGRESS NOTES
Post-Partum Note   4/12/2019, 9:17 AM    Subjective:  POD 0     No complaints. Yes nausea; Passing gas No.  Lochia normal. Voiding normal. Not dizzy. Mood good. Adequate pain relief.         Objective:   04/12/19  0620 04/12/19  0700 04/12/19  0742 04/12/19

## 2019-04-12 NOTE — PROGRESS NOTES
Dr. Linda Samuel called in for update on patient. Pulse now 113-118. Pt still asymptomatic. Tolerating clear liquid diet. Pt to continue to push fluids. No new orders at this time.

## 2019-04-12 NOTE — PROGRESS NOTES
[facility]    Labor Progress Note    Elias Hunt Patient Status:  Inpatient    8/15/1995 MRN [id number]   Location [address]  Attending Justina Perkins MD   [facility] Day # 2 PCP MD Kyra Kahn

## 2019-04-12 NOTE — PROGRESS NOTES
Spoke with Dr. Cherie Roy via phone regarding pt's elevated pulse 120's. Pt resting in bed in no apparent distress. No complaints voiced to RN. Pt denies chest pain, SOB, anxiety. Lochia small, urine output concentrated but WNL.      Orders received for 500 cc

## 2019-04-12 NOTE — LACTATION NOTE
LACTATION NOTE - MOTHER      Evaluation Type: Inpatient    Problems identified  Problems identified: Milk supply WNL; Knowledge deficit    Maternal history  Maternal history:  section; Induction of labor;PIH  Other/comment: mild hypertension during l

## 2019-04-12 NOTE — PROGRESS NOTES
Patient transferred to mother/baby room 357 per cart in stable condition with baby and personal belongings. Accompanied by  and staff. Will ctm.

## 2019-04-12 NOTE — PROGRESS NOTES
Dr Twin Sun at  discussing possible c/s w/ pt and family d/t decels. Risks and benefit of c/s discussed. Pt verbalized understanding.  Will ctm

## 2019-04-12 NOTE — ANESTHESIA POST-OP FOLLOW-UP NOTE
Ms. Elias Hunt is POD#1 after her  performed under epidural anesthesia. Denies headache, back pain, or residual LE weakness/numbness. Pain is well controlled. No further anesthesia management needed at this time.  Doing well on oral pain me

## 2019-04-12 NOTE — BRIEF OP NOTE
Pre-Operative Diagnosis: Fetal Intolerance of Labor     Post-Operative Diagnosis: Fetal intolerance of labor      Procedure Performed:   Procedure(s):primary LTCSx      Surgeon(s) and Role:     Erika Roldan MD - Primary     * Goldy Porras MD

## 2019-04-12 NOTE — PROGRESS NOTES
Spontaneous decel, to 90 bpm, resolved with pos changeSVE : complete /0      Will attempt to push, given recent decels with earlier exams and now, will call ayan. Second MD in house. Disc possibiltiy of  section with pt/FOB.

## 2019-04-12 NOTE — PLAN OF CARE
Assessment WNL. HR normalizing. Breastfeeding infant. Fundus firm; lochia small. Reviewed paperwork and new beginnings booklet including EPDS and birth certificate.        Problem: CARDIOVASCULAR - ADULT  Goal: Maintains optimal cardiac output and hemo for perineum discomfort. - Monitor healing of incision/episiotomy/laceration, and assess for signs and symptoms of infection and hematoma. - Assess bladder function and monitor for bladder distention.  - Provide/instruct/assist with pericare as needed. as needed. - Encourage rooming-in and breast feeding on demand.  - Encourage skin-to-skin contact. - Provide  support as needed. - Assess for and manage engorgement.   - Provide breast feeding education handouts and information on Critical access hospital breast feed

## 2019-04-12 NOTE — PROGRESS NOTES
[facility]    Labor Progress Note    Tejas Naval Patient Status:  Inpatient    8/15/1995 MRN [id number]   Location [address]  Attending Beather Cooks, MD   [facility] Day # 1 PCP John Palacio MD       [facility]

## 2019-04-12 NOTE — PROGRESS NOTES
[facility] Pharmacy Note:  Renal Adjustment for gentamicin (GARAMYCIN)    Tejas Oliveira is a 21year old female who has been prescribed gentamicin (GARAMYCIN) 120 mg every 8 hrs.   CrCl is estimated creatinine clearance is 108.1 mL/min (based on SCr of 0.64 mg/d

## 2019-04-12 NOTE — PROGRESS NOTES
Pt c/o back pain, no c/o sx preeclampsia. BP mildly elevated SBP, DBP WNL, afeb  FHTs 140, accels , occas variable  UCs q 2, can not get adequate despite satisfactory positioning of IUPC and increasing pitocin.  Pit has been turned off x 2 today for decels

## 2019-04-12 NOTE — ANESTHESIA POSTPROCEDURE EVALUATION
Patient: Andrez Lax    Procedure Summary     Date:  19 Room / Location:  [address] [place] [address] L+D OR  [facility] L+D OR    Anesthesia Start:  1139 Anesthesia Stop:      Procedure:   SECTION (N/A Abdomen) Diagnosis:  (Fetal intolerance of labor)    Surgeon:

## 2019-04-12 NOTE — PROGRESS NOTES
Notified Dr. Lester Reading of elevated pulse 120's and temp 100.2. Pt c/o feeling hot. Lochia small, urine output WNL. Pt denies SOB/chest pain. Dr. Lester Reading states she will put in abx orders. Stat CBC still pending. Will ctm.

## 2019-04-12 NOTE — LACTATION NOTE
This note was copied from a baby's chart. LACTATION NOTE - INFANT    Evaluation Type  Evaluation Type: Inpatient    Problems & Assessment  Problems Diagnosed or Identified: Sleepy; Latch difficulty  Infant Assessment: Minimal hunger cues present;Skin color

## 2019-04-13 RX ORDER — HEPARIN SODIUM 5000 [USP'U]/ML
5000 INJECTION, SOLUTION INTRAVENOUS; SUBCUTANEOUS EVERY 8 HOURS SCHEDULED
Status: DISCONTINUED | OUTPATIENT
Start: 2019-04-13 | End: 2019-04-15

## 2019-04-13 NOTE — PROGRESS NOTES
Post-Partum Note   4/13/2019, 8:19 AM    Subjective:  POD 1  No complaints. No nausea; Passing gas No.  Lochia normal. Voiding normal. Not dizzy. Mood good. Adequate pain relief.         Objective:   04/12/19  1353 04/12/19  1520 04/12/19  1750 04/12/19  23

## 2019-04-13 NOTE — PLAN OF CARE
Problem: CARDIOVASCULAR - ADULT  Goal: Maintains optimal cardiac output and hemodynamic stability  Description  INTERVENTIONS:  - Monitor vital signs, rhythm, and trends  - Monitor for bleeding, hypotension and signs of decreased cardiac output  - Evalua profile  Outcome: Progressing  Goal: Maintains adequate nutritional intake (undernourished)  Description  INTERVENTIONS:  - Monitor percentage of each meal consumed  - Identify factors contributing to decreased intake, treat as appropriate  - Assist with m current breast feeding efforts. - Assess support systems available to mother/family.  - Identify cultural beliefs/practices regarding lactation, letdown techniques, maternal food preferences.   - Assess mother's knowledge and previous experience with breas mother/family.  - Provide /case management support as needed.   Outcome: Progressing     Problem: PAIN - ADULT  Goal: Verbalizes/displays adequate comfort level or patient's stated pain goal  Description  INTERVENTIONS:  - Encourage pt to mon

## 2019-04-14 NOTE — PROGRESS NOTES
Post-Partum Note   4/14/2019, 5:36 PM    Subjective:  POD 2   No complaints. Yes,some nausea, but tolerating genl diet; Passing gas Yes. Lochia normal. Voiding normal. Not dizzy. Mood good. Adequate pain relief.         Objective:   04/13/19  1620 04/13/19

## 2019-04-14 NOTE — PLAN OF CARE
Problem: CARDIOVASCULAR - ADULT  Goal: Maintains optimal cardiac output and hemodynamic stability  Description  INTERVENTIONS:  - Monitor vital signs, rhythm, and trends  - Monitor for bleeding, hypotension and signs of decreased cardiac output  - Evalua Provide/instruct/assist with pericare as needed. - Provide VTE prophylaxis as needed. - Monitor bowel function.  - Encourage ambulation and provide assistance as needed.   - Assess and monitor emotional status and provide social service/psych resources as handouts and information on community breast feeding support.    Outcome: Progressing  Goal: Establishment of adequate milk supply with medication/procedure interruptions  Description  INTERVENTIONS:  - Review techniques for milk expression (breast pumping)

## 2019-04-15 VITALS
HEIGHT: 62 IN | HEART RATE: 104 BPM | TEMPERATURE: 98 F | WEIGHT: 246 LBS | OXYGEN SATURATION: 98 % | SYSTOLIC BLOOD PRESSURE: 134 MMHG | BODY MASS INDEX: 45.27 KG/M2 | DIASTOLIC BLOOD PRESSURE: 83 MMHG | RESPIRATION RATE: 16 BRPM

## 2019-04-15 PROBLEM — O48.0 POST TERM PREGNANCY AT 41 WEEKS GESTATION (HCC): Status: RESOLVED | Noted: 2019-04-10 | Resolved: 2019-04-15

## 2019-04-15 PROBLEM — Z3A.41 POST TERM PREGNANCY AT 41 WEEKS GESTATION (HCC): Status: RESOLVED | Noted: 2019-04-10 | Resolved: 2019-04-15

## 2019-04-15 PROBLEM — O48.0 POST TERM PREGNANCY AT 41 WEEKS GESTATION: Status: RESOLVED | Noted: 2019-04-10 | Resolved: 2019-04-15

## 2019-04-15 PROBLEM — Z3A.41 POST TERM PREGNANCY AT 41 WEEKS GESTATION: Status: RESOLVED | Noted: 2019-04-10 | Resolved: 2019-04-15

## 2019-04-15 RX ORDER — HYDROMORPHONE HYDROCHLORIDE 2 MG/1
2 TABLET ORAL EVERY 4 HOURS PRN
Qty: 20 TABLET | Refills: 0 | Status: SHIPPED | OUTPATIENT
Start: 2019-04-15 | End: 2019-10-22

## 2019-04-15 NOTE — PROGRESS NOTES
[facility] - [facility]    OB/GYNE Progress Note      Raman Ho Patient Status:  Inpatient    8/15/1995 MRN [id number]   Location [facility] 3SE Attending James Whitfield MD   [facility] Day # 5 PCP John Polanco MD       Assessment/Plan     P for insertion/continuing: n/a     Results:     Lab Results   Component Value Date    TREPONEMALAB Negative 03/11/2019    HBVSAG Nonreactive  08/08/2018    ABO O 04/10/2019    RH Positive 04/10/2019    WBC 13.5 (H) 04/13/2019    HGB 7.7 (L) 04/13/2019    HC

## 2019-04-15 NOTE — DISCHARGE SUMMARY
[facility]    Discharge Summary    Pardeep Blower Patient Status:  Inpatient    8/15/1995 MRN [id number]   Location [facility] 3SE Attending Paola Beck MD   [address] Day # 5       Admission Date: 4/10/2019  Discharge Date:   4

## 2019-04-16 NOTE — LACTATION NOTE
Patient states that she is more comfortable feeding her baby with formula than breastfeeding. Treatment of engorgement reviewed. Mom provided with a hand pump to pump for comfort. Advised to feed infant any breast milk she obtains.

## 2019-05-03 NOTE — OPERATIVE REPORT
[facility]    PATIENT'S NAME: Isabel Rubin   ATTENDING PHYSICIAN: Dacia Dutta. Johnie Rueda MD   OPERATING PHYSICIAN: Celeste Leal.  Salvador Veliz MD   PATIENT ACCOUNT#:   [id number]    LOCATION:  [address] [place] [address] #:   [id number]       DATE OF BIRTH: fascia was closed in the usual fashion beginning at the edges and moving toward the midline with 0 Vicryl in a running nonlocking fashion.   Subcutaneous bleeding vessels were cauterized with Bovie cautery and the subcutaneous was reapproximated with 3-0 pl

## 2019-05-30 ENCOUNTER — LAB REQUISITION (OUTPATIENT)
Dept: LAB | Facility: HOSPITAL | Age: 24
End: 2019-05-30
Payer: COMMERCIAL

## 2019-05-30 DIAGNOSIS — Z01.419 ENCOUNTER FOR GYNECOLOGICAL EXAMINATION WITHOUT ABNORMAL FINDING: ICD-10-CM

## 2019-05-30 PROCEDURE — 88175 CYTOPATH C/V AUTO FLUID REDO: CPT | Performed by: OBSTETRICS & GYNECOLOGY

## 2019-06-10 ENCOUNTER — LAB REQUISITION (OUTPATIENT)
Dept: LAB | Facility: HOSPITAL | Age: 24
End: 2019-06-10
Payer: COMMERCIAL

## 2019-06-10 DIAGNOSIS — E28.2 POLYCYSTIC OVARIAN SYNDROME: ICD-10-CM

## 2019-06-10 PROCEDURE — 83498 ASY HYDROXYPROGESTERONE 17-D: CPT | Performed by: OBSTETRICS & GYNECOLOGY

## 2019-06-10 PROCEDURE — 82627 DEHYDROEPIANDROSTERONE: CPT | Performed by: OBSTETRICS & GYNECOLOGY

## 2019-06-10 PROCEDURE — 84403 ASSAY OF TOTAL TESTOSTERONE: CPT | Performed by: OBSTETRICS & GYNECOLOGY

## 2019-10-22 ENCOUNTER — OFFICE VISIT (OUTPATIENT)
Dept: FAMILY MEDICINE CLINIC | Facility: CLINIC | Age: 24
End: 2019-10-22
Payer: COMMERCIAL

## 2019-10-22 VITALS
BODY MASS INDEX: 43.98 KG/M2 | TEMPERATURE: 99 F | SYSTOLIC BLOOD PRESSURE: 121 MMHG | HEART RATE: 84 BPM | WEIGHT: 239 LBS | HEIGHT: 62 IN | DIASTOLIC BLOOD PRESSURE: 86 MMHG

## 2019-10-22 DIAGNOSIS — Z00.00 WELL ADULT EXAM: ICD-10-CM

## 2019-10-22 DIAGNOSIS — L83 ACANTHOSIS NIGRICANS: ICD-10-CM

## 2019-10-22 DIAGNOSIS — E66.01 MORBID OBESITY WITH BMI OF 40.0-44.9, ADULT (HCC): Primary | ICD-10-CM

## 2019-10-22 DIAGNOSIS — E88.81 INSULIN RESISTANCE: ICD-10-CM

## 2019-10-22 DIAGNOSIS — Z23 NEED FOR INFLUENZA VACCINATION: ICD-10-CM

## 2019-10-22 PROBLEM — O16.3 ELEVATED BLOOD PRESSURE COMPLICATING PREGNANCY, ANTEPARTUM, THIRD TRIMESTER: Status: RESOLVED | Noted: 2019-04-11 | Resolved: 2019-10-22

## 2019-10-22 PROBLEM — Z34.90 PREGNANCY: Status: RESOLVED | Noted: 2019-02-11 | Resolved: 2019-10-22

## 2019-10-22 PROBLEM — O16.3 ELEVATED BLOOD PRESSURE COMPLICATING PREGNANCY, ANTEPARTUM, THIRD TRIMESTER (HCC): Status: RESOLVED | Noted: 2019-04-11 | Resolved: 2019-10-22

## 2019-10-22 PROBLEM — Z34.90 PREGNANCY (HCC): Status: RESOLVED | Noted: 2019-02-11 | Resolved: 2019-10-22

## 2019-10-22 PROBLEM — O14.90 PREECLAMPSIA (HCC): Status: RESOLVED | Noted: 2019-04-12 | Resolved: 2019-10-22

## 2019-10-22 PROBLEM — O14.90 PREECLAMPSIA: Status: RESOLVED | Noted: 2019-04-12 | Resolved: 2019-10-22

## 2019-10-22 PROBLEM — Z98.890 POST-OPERATIVE STATE: Status: RESOLVED | Noted: 2019-04-12 | Resolved: 2019-10-22

## 2019-10-22 PROCEDURE — 99395 PREV VISIT EST AGE 18-39: CPT | Performed by: FAMILY MEDICINE

## 2019-10-22 PROCEDURE — 90686 IIV4 VACC NO PRSV 0.5 ML IM: CPT | Performed by: FAMILY MEDICINE

## 2019-10-22 PROCEDURE — 90471 IMMUNIZATION ADMIN: CPT | Performed by: FAMILY MEDICINE

## 2019-10-22 RX ORDER — NORGESTIMATE AND ETHINYL ESTRADIOL 0.25-0.035
KIT ORAL
Refills: 0 | COMMUNITY
Start: 2019-10-21 | End: 2020-06-15

## 2019-10-22 NOTE — PROGRESS NOTES
HPI:    Patient ID: Gene Heath is a 25year old female.     HPI  Patient presents with:  Routine Physical    Had a baby 6 months  Not nursing  Had a c section due to fetal bradycardia  Had some high bps towards end of pregnancy    Review of Systems   C • Asthma    • Lipid screening 8/21/10    per NG   • Morbid obesity with BMI of 40.0-44.9, adult ([facility] [address]    • Vitamin D deficiency      Past Surgical History:   Procedure Laterality Date   •      •  SECTION N/A 2019    Performed by [employer]  Service: Not Asked        Blood Transfusions: Not Asked        Caffeine Concern: Yes          1 cup soda/coffee per day        Occupational Exposure: Not Asked        Hobby Hazards: Not Asked        Sleep Concern: Not Asked        Stress Concern: ESTARYLLA 0.25-35 MG-MCG Oral Tab, , Disp: , Rfl: 0      Allergies:No Known Allergies   PHYSICAL EXAM:   Patient presents with:  Routine Physical     Physical Exam   Constitutional: She is oriented to person, place, and time.  She appears well-developed and BASIC METABOLIC PANEL (8); Future  - CBC WITH DIFFERENTIAL WITH PLATELET; Future  - LIPID PANEL; Future  - TSH W REFLEX TO FREE T4; Future  - VITAMIN D, 25-HYDROXY; Future  - VITAMIN B12; Future    3. Insulin resistance    - HEMOGLOBIN A1C; Future    4.  Ne

## 2019-10-23 ENCOUNTER — LAB ENCOUNTER (OUTPATIENT)
Dept: LAB | Age: 24
End: 2019-10-23
Attending: FAMILY MEDICINE
Payer: COMMERCIAL

## 2019-10-23 DIAGNOSIS — Z00.00 WELL ADULT EXAM: ICD-10-CM

## 2019-10-23 DIAGNOSIS — E88.81 INSULIN RESISTANCE: ICD-10-CM

## 2019-10-23 DIAGNOSIS — E66.01 MORBID OBESITY WITH BMI OF 40.0-44.9, ADULT (HCC): ICD-10-CM

## 2019-10-23 PROCEDURE — 83036 HEMOGLOBIN GLYCOSYLATED A1C: CPT

## 2019-10-23 PROCEDURE — 85025 COMPLETE CBC W/AUTO DIFF WBC: CPT

## 2019-10-23 PROCEDURE — 84443 ASSAY THYROID STIM HORMONE: CPT

## 2019-10-23 PROCEDURE — 84481 FREE ASSAY (FT-3): CPT

## 2019-10-23 PROCEDURE — 84460 ALANINE AMINO (ALT) (SGPT): CPT

## 2019-10-23 PROCEDURE — 82607 VITAMIN B-12: CPT

## 2019-10-23 PROCEDURE — 84450 TRANSFERASE (AST) (SGOT): CPT

## 2019-10-23 PROCEDURE — 82306 VITAMIN D 25 HYDROXY: CPT

## 2019-10-23 PROCEDURE — 36415 COLL VENOUS BLD VENIPUNCTURE: CPT

## 2019-10-23 PROCEDURE — 80048 BASIC METABOLIC PNL TOTAL CA: CPT

## 2019-10-23 PROCEDURE — 80061 LIPID PANEL: CPT

## 2019-10-23 PROCEDURE — 84439 ASSAY OF FREE THYROXINE: CPT

## 2019-10-26 ENCOUNTER — TELEPHONE (OUTPATIENT)
Dept: FAMILY MEDICINE CLINIC | Facility: CLINIC | Age: 24
End: 2019-10-26

## 2019-10-26 DIAGNOSIS — R79.89 LOW TSH LEVEL: ICD-10-CM

## 2019-10-26 DIAGNOSIS — E55.9 VITAMIN D DEFICIENCY: Primary | ICD-10-CM

## 2019-10-28 ENCOUNTER — TELEPHONE (OUTPATIENT)
Dept: ENDOCRINOLOGY CLINIC | Facility: CLINIC | Age: 24
End: 2019-10-28

## 2019-10-28 PROBLEM — R79.89 LOW TSH LEVEL: Status: ACTIVE | Noted: 2019-10-28

## 2019-10-28 RX ORDER — ERGOCALCIFEROL 1.25 MG/1
50000 CAPSULE ORAL WEEKLY
Qty: 12 CAPSULE | Refills: 3 | Status: SHIPPED | OUTPATIENT
Start: 2019-10-28 | End: 2019-11-27

## 2019-10-28 NOTE — TELEPHONE ENCOUNTER
Patient called stating she called Dr. Alem Morales office to schedule an appointment and was told that the next available appointment is 01/30/2020. Called endocrinology's office and was told that Dr. Esha Calles next available appt is 12/24.  Left message f

## 2019-10-28 NOTE — TELEPHONE ENCOUNTER
Labs all good, thyroid overactive, needs to see endocrinology  Vitamin D very low.  Recommend Vitamin D 50,000 U( prescription) weekly for 8 -12 weeks and then 2000 U (OTC) daily  No Diabetes mellitus but prediabetic, at risk for developing Diabetes mellitu

## 2019-10-28 NOTE — TELEPHONE ENCOUNTER
PCP hoping for a sooner appt with pt for an overactive thyroid - has appt for January     pts # [phone]

## 2019-10-29 ENCOUNTER — TELEPHONE (OUTPATIENT)
Dept: ENDOCRINOLOGY CLINIC | Facility: CLINIC | Age: 24
End: 2019-10-29

## 2019-10-29 NOTE — TELEPHONE ENCOUNTER
Spoke with patient has an appt on January 30th but she has also been placed on the wait list.    Dr. Remonia Kehr, do you have any influence with the endo office? ?

## 2019-10-29 NOTE — TELEPHONE ENCOUNTER
Message sent to Dr. Castañeda Peers on 10/28 asking for sooner that first available appt. Will await her response. SH out of the office 10/28 and 10/29.

## 2019-10-29 NOTE — TELEPHONE ENCOUNTER
PCP requesting sooner apt than Dhaval for pt who is hyperthyroid    Component      Latest Ref Rng & Units 10/23/2019   TSH      0.358 - 3.740 mIU/mL 0.020 (L)     Please advise

## 2019-10-29 NOTE — TELEPHONE ENCOUNTER
Please see prior encounters, not able to reply on that message chain for some reason  Not urgent since thyroid hormone is normal  I can see her in the next 4 weeks  Monday or Wednesday   Thanks

## 2019-10-29 NOTE — TELEPHONE ENCOUNTER
Unfortunately the schedule is extremely full. Her FT4 is normal so no clear urgency. Will defer to AM if she would like to add on sooner. Thanks.

## 2019-11-23 ENCOUNTER — OFFICE VISIT (OUTPATIENT)
Dept: ENDOCRINOLOGY CLINIC | Facility: CLINIC | Age: 24
End: 2019-11-23

## 2019-11-23 ENCOUNTER — APPOINTMENT (OUTPATIENT)
Dept: LAB | Facility: HOSPITAL | Age: 24
End: 2019-11-23
Attending: INTERNAL MEDICINE
Payer: COMMERCIAL

## 2019-11-23 VITALS
DIASTOLIC BLOOD PRESSURE: 90 MMHG | WEIGHT: 237 LBS | HEART RATE: 80 BPM | SYSTOLIC BLOOD PRESSURE: 142 MMHG | BODY MASS INDEX: 43 KG/M2

## 2019-11-23 DIAGNOSIS — R79.89 LOW TSH LEVEL: ICD-10-CM

## 2019-11-23 DIAGNOSIS — R79.89 LOW TSH LEVEL: Primary | ICD-10-CM

## 2019-11-23 PROCEDURE — 99203 OFFICE O/P NEW LOW 30 MIN: CPT | Performed by: INTERNAL MEDICINE

## 2019-11-23 PROCEDURE — 84439 ASSAY OF FREE THYROXINE: CPT

## 2019-11-23 PROCEDURE — 36415 COLL VENOUS BLD VENIPUNCTURE: CPT

## 2019-11-23 PROCEDURE — 84443 ASSAY THYROID STIM HORMONE: CPT

## 2019-11-23 PROCEDURE — 84481 FREE ASSAY (FT-3): CPT

## 2019-11-23 NOTE — H&P
New Patient Evaluation - History and Physical    CONSULT - Reason for Visit: Low TSH  Requesting Physician:Dr. Polanco    CHIEF COMPLAINT:  Patient presents with:  Consult: Pt. referred by PCP for overractive thyroid       HISTORY OF PRESENT ILLNESS:   Angelica Patel Yes        Partners: Male        Birth control/protection: Condom    Other Topics      Concerns:        Caffeine Concern: Yes          1 cup soda/coffee per day      FAMILY HISTORY:   Family History   Problem Relation Age of Onset   • Other (Other) Mother lesions  EXTREMITIES: no edema      DATA:     Pertinent data reviewed      ASSESSMENT AND PLAN:    Patient is a 25year old female with low TSH level. Normal thyroid hormone levels.  Clinically euthyroid  She is 7 months post partum.        Discussed the fo concentrated in breast milk as much as other beta blockers.  In the hyperthyroid phase, radioiodine treatment and antithyroid drugs (ie, methimazole) are of no value because the synthesis of T4 and T3 is decreased, not increased as in most other disorders c

## 2019-11-24 ENCOUNTER — TELEPHONE (OUTPATIENT)
Dept: ENDOCRINOLOGY CLINIC | Facility: CLINIC | Age: 24
End: 2019-11-24

## 2019-11-24 DIAGNOSIS — E05.90 SUBCLINICAL HYPERTHYROIDISM: Primary | ICD-10-CM

## 2019-11-24 NOTE — TELEPHONE ENCOUNTER
Patient is post partum with low TSH  Repeat is about the same. Hence, I suggest proceeding with a NM thyroid scan and uptake to help us find out cause of thyroid gland over activity  Please confirm that she is not breast feeding  Thanks.

## 2019-11-27 NOTE — TELEPHONE ENCOUNTER
You are given a pill /drink that contains a tiny amount of radioactive iodine. After swallowing it, you wait as the iodine collects in the thyroid. The first uptake is usually done a few hours hours after you take the iodine pill.   This test gives us an id

## 2019-11-27 NOTE — TELEPHONE ENCOUNTER
Pt states she is not breastfeeding. She does have questions on the procedure of the thyroid scan and uptake. It's a dye that's injected and how are the images of the thyroid taken? Pls advise.

## 2019-12-03 NOTE — TELEPHONE ENCOUNTER
Sanju Jha called back with a few more questions regarding NM scan. Also provided phone number to [address] [place] [address] for further information.

## 2019-12-11 ENCOUNTER — HOSPITAL ENCOUNTER (OUTPATIENT)
Dept: NUCLEAR MEDICINE | Facility: HOSPITAL | Age: 24
Discharge: HOME | End: 2019-12-11
Attending: INTERNAL MEDICINE
Payer: COMMERCIAL

## 2019-12-11 DIAGNOSIS — E05.90 SUBCLINICAL HYPERTHYROIDISM: ICD-10-CM

## 2019-12-11 PROCEDURE — 78014 THYROID IMAGING W/BLOOD FLOW: CPT | Performed by: INTERNAL MEDICINE

## 2019-12-16 ENCOUNTER — TELEPHONE (OUTPATIENT)
Dept: ENDOCRINOLOGY CLINIC | Facility: CLINIC | Age: 24
End: 2019-12-16

## 2019-12-16 DIAGNOSIS — E05.90 SUBCLINICAL HYPERTHYROIDISM: Primary | ICD-10-CM

## 2019-12-16 NOTE — TELEPHONE ENCOUNTER
NM thyroid scan shows a hot nodule  Discussed the findings in detail. She will not like to go for DUTTON at this time  She is asymptomatic at this time and hence will like to repeat labs in three months  Knows to call symptoms of hyperthyroidism.    Thanks

## 2019-12-30 ENCOUNTER — OFFICE VISIT (OUTPATIENT)
Dept: FAMILY MEDICINE CLINIC | Facility: CLINIC | Age: 24
End: 2019-12-30

## 2019-12-30 VITALS
DIASTOLIC BLOOD PRESSURE: 83 MMHG | BODY MASS INDEX: 43.06 KG/M2 | HEIGHT: 62 IN | OXYGEN SATURATION: 96 % | TEMPERATURE: 98 F | WEIGHT: 234 LBS | HEART RATE: 127 BPM | SYSTOLIC BLOOD PRESSURE: 136 MMHG

## 2019-12-30 DIAGNOSIS — J06.9 UPPER RESPIRATORY TRACT INFECTION, UNSPECIFIED TYPE: Primary | ICD-10-CM

## 2019-12-30 PROCEDURE — 99213 OFFICE O/P EST LOW 20 MIN: CPT | Performed by: PHYSICIAN ASSISTANT

## 2019-12-30 RX ORDER — AZITHROMYCIN 250 MG/1
TABLET, FILM COATED ORAL
Qty: 6 TABLET | Refills: 0 | Status: SHIPPED | OUTPATIENT
Start: 2019-12-30 | End: 2020-06-15

## 2019-12-30 NOTE — PATIENT INSTRUCTIONS
Z-pack  Take 2 tablets together today, then 1 tablet for the next 4 days. You take it for 5 days, but it lasts in your system for 10 days.     Wait until Wednesday or Thursday     Advil cold and sinus    Steam from the shower   Hydrate yourself well     Tyl

## 2019-12-30 NOTE — PROGRESS NOTES
HPI:    Patient ID: Elias Hunt is a 25year old female. Pt presents with cold symptoms for the past 3 days. Pt has had cough, sore throat. Has congestion. No ear symptoms noted. Has headaches. No fevers. Pt has tried otc remedies without relief.  Pt Dull TM bilaterally. Eyes: Conjunctivae are normal. Right eye exhibits no discharge. Left eye exhibits no discharge. Neck: Normal range of motion. Neck supple. Cardiovascular: Normal rate, regular rhythm and normal heart sounds.     Pulmonary/Chest

## 2020-02-19 ENCOUNTER — TELEPHONE (OUTPATIENT)
Dept: ENDOCRINOLOGY CLINIC | Facility: CLINIC | Age: 25
End: 2020-02-19

## 2020-02-19 NOTE — TELEPHONE ENCOUNTER
Called patient and gave message by dr chang . Patient verbalized understanding. Only co palpitations once in a while when she lays flat in bed. States she stopped drinking caffeine and its better.  Patient also advised to stay hydrated and if symptoms returned

## 2020-02-19 NOTE — TELEPHONE ENCOUNTER
Yes okay to keep apt in April 2020  Please screen for hyperthyroid symptoms  If she is symptomatic, should repeat labs now  TSH, Free T4 and Free T3  Thanks

## 2020-02-19 NOTE — TELEPHONE ENCOUNTER
Patient wanted to know if it was ok for her to be seen on 4/28/20 or does she need to be seen sooner? Please call. Thank you.

## 2020-02-19 NOTE — TELEPHONE ENCOUNTER
Dr. Tayla Amato,     Please see below message. Attempted to contact the patient if she's having any symptoms that would prompt a sooner visit but did not . Left a message to call back. If she's asymptomatic, is her April appointment okay?   I didn'

## 2020-02-27 ENCOUNTER — NURSE TRIAGE (OUTPATIENT)
Dept: FAMILY MEDICINE CLINIC | Facility: CLINIC | Age: 25
End: 2020-02-27

## 2020-02-27 NOTE — TELEPHONE ENCOUNTER
Action Requested: Summary for Provider     []  Critical Lab, Recommendations Needed  [] Need Additional Advice  [x]   FYI    []   Need Orders  [] Need Medications Sent to Pharmacy  []  Other     SUMMARY: Patient c/o left great toe pain 6/10 for about a wee

## 2020-05-04 ENCOUNTER — NURSE TRIAGE (OUTPATIENT)
Dept: FAMILY MEDICINE CLINIC | Facility: CLINIC | Age: 25
End: 2020-05-04

## 2020-05-04 NOTE — TELEPHONE ENCOUNTER
Symptoms started with Saturday. Cough occasional, nasal congestion, mild sore throat. No fever, no sob. Symptoms are mild. No direct contact with covid 19. Patient to monitor symptoms. Home care advise given. Call back if symptoms worsen.

## 2020-05-04 NOTE — TELEPHONE ENCOUNTER
Home care advise given.    Reason for Disposition  • Colds with no complications    Protocols used: COMMON COLD-A-OH

## 2020-06-04 ENCOUNTER — TELEPHONE (OUTPATIENT)
Dept: ENDOCRINOLOGY CLINIC | Facility: CLINIC | Age: 25
End: 2020-06-04

## 2020-06-04 NOTE — TELEPHONE ENCOUNTER
Pt is scheduled for a Video Visit with Dr. Zoya Michael for this Sat. 6/06/20. She is requesting lab orders today so she can do them prior to her visit. Please call pt at [phone].

## 2020-06-15 ENCOUNTER — OFFICE VISIT (OUTPATIENT)
Dept: FAMILY MEDICINE CLINIC | Facility: CLINIC | Age: 25
End: 2020-06-15

## 2020-06-15 VITALS
SYSTOLIC BLOOD PRESSURE: 135 MMHG | HEART RATE: 99 BPM | TEMPERATURE: 99 F | WEIGHT: 248 LBS | HEIGHT: 62 IN | BODY MASS INDEX: 45.64 KG/M2 | DIASTOLIC BLOOD PRESSURE: 81 MMHG

## 2020-06-15 DIAGNOSIS — E66.01 MORBID OBESITY WITH BMI OF 45.0-49.9, ADULT (HCC): ICD-10-CM

## 2020-06-15 DIAGNOSIS — L21.9 SEBORRHEIC DERMATITIS OF SCALP: Primary | ICD-10-CM

## 2020-06-15 PROCEDURE — 99213 OFFICE O/P EST LOW 20 MIN: CPT | Performed by: FAMILY MEDICINE

## 2020-06-15 RX ORDER — KETOCONAZOLE 20 MG/ML
1 SHAMPOO TOPICAL
Qty: 120 ML | Refills: 1 | Status: SHIPPED | OUTPATIENT
Start: 2020-06-15

## 2020-06-15 NOTE — PATIENT INSTRUCTIONS
Understanding Seborrheic Dermatitis    Seborrheic dermatitis is a common type of rash that causes red, scaly, greasy skin. It occurs on skin that has oil glands. These include the face, upper chest, and scalp, where it is often called dandruff.  It tends · Sodium sulfacetemide creams and washes. These may also help. In some cases one treatment will stop working after a while. Switching between treatments every few months may be helpful. Wash your skin gently.  You can remove scales with oil and gentle rub

## 2020-06-15 NOTE — PROGRESS NOTES
HPI:    Patient ID: Guillermina Gamino is a 25year old female.     HPI  Patient presents with:  Rash: on forehead noticed a year ago after giving birth   Referral: for weight loss     Patient states she has noticed a scaly rash on her forehead just at the edg ([address]  Highly recommend to lose weight. Discussed good dietary and eating habits as well as increasing vegetable and fruit intake. Recommending avoiding foods high in fat content. Recommend exercising at least 30-40 minutes 5-6 days a week.   Avoid skipp

## 2020-06-30 NOTE — PATIENT INSTRUCTIONS
Over the counter sudafed twice daily as needed
[FreeTextEntry1] : Wellness complete\par  labs today\par CKD check labs today\par  f/up with Nephrology at [facility]\par HTN- controlled\par STI testing today\par Crohns- controlled- f/up GI routinely\par f/up annually

## 2020-07-01 ENCOUNTER — APPOINTMENT (OUTPATIENT)
Dept: LAB | Facility: HOSPITAL | Age: 25
End: 2020-07-01
Attending: INTERNAL MEDICINE
Payer: COMMERCIAL

## 2020-07-01 DIAGNOSIS — E05.90 SUBCLINICAL HYPERTHYROIDISM: ICD-10-CM

## 2020-07-01 LAB
T3FREE SERPL-MCNC: 3.51 PG/ML (ref 2.4–4.2)
T4 FREE SERPL-MCNC: 1.3 NG/DL (ref 0.8–1.7)
TSI SER-ACNC: 0.5 MIU/ML (ref 0.36–3.74)

## 2020-07-01 PROCEDURE — 84443 ASSAY THYROID STIM HORMONE: CPT

## 2020-07-01 PROCEDURE — 84439 ASSAY OF FREE THYROXINE: CPT

## 2020-07-01 PROCEDURE — 84481 FREE ASSAY (FT-3): CPT

## 2020-07-01 PROCEDURE — 36415 COLL VENOUS BLD VENIPUNCTURE: CPT

## 2020-07-06 ENCOUNTER — TELEPHONE (OUTPATIENT)
Dept: ENDOCRINOLOGY CLINIC | Facility: CLINIC | Age: 25
End: 2020-07-06

## 2020-07-06 ENCOUNTER — TELEMEDICINE (OUTPATIENT)
Dept: ENDOCRINOLOGY CLINIC | Facility: CLINIC | Age: 25
End: 2020-07-06

## 2020-07-06 ENCOUNTER — OFFICE VISIT (OUTPATIENT)
Dept: SURGERY | Facility: CLINIC | Age: 25
End: 2020-07-06

## 2020-07-06 VITALS
OXYGEN SATURATION: 98 % | WEIGHT: 249.69 LBS | SYSTOLIC BLOOD PRESSURE: 110 MMHG | HEART RATE: 91 BPM | HEIGHT: 61.5 IN | BODY MASS INDEX: 46.54 KG/M2 | DIASTOLIC BLOOD PRESSURE: 74 MMHG

## 2020-07-06 DIAGNOSIS — E88.81 INSULIN RESISTANCE: ICD-10-CM

## 2020-07-06 DIAGNOSIS — R63.5 WEIGHT GAIN: ICD-10-CM

## 2020-07-06 DIAGNOSIS — L83 ACANTHOSIS NIGRICANS: ICD-10-CM

## 2020-07-06 DIAGNOSIS — N92.6 IRREGULAR PERIODS/MENSTRUAL CYCLES: ICD-10-CM

## 2020-07-06 DIAGNOSIS — R73.03 PREDIABETES: Primary | ICD-10-CM

## 2020-07-06 DIAGNOSIS — E04.1 HOT THYROID NODULE: Primary | ICD-10-CM

## 2020-07-06 DIAGNOSIS — E66.01 MORBID OBESITY WITH BMI OF 45.0-49.9, ADULT (HCC): ICD-10-CM

## 2020-07-06 DIAGNOSIS — E55.9 VITAMIN D DEFICIENCY: ICD-10-CM

## 2020-07-06 DIAGNOSIS — R79.89 LOW TSH LEVEL: ICD-10-CM

## 2020-07-06 PROBLEM — E78.1 HYPERTRIGLYCERIDEMIA: Status: ACTIVE | Noted: 2020-07-06

## 2020-07-06 PROCEDURE — 99204 OFFICE O/P NEW MOD 45 MIN: CPT | Performed by: NURSE PRACTITIONER

## 2020-07-06 PROCEDURE — 99213 OFFICE O/P EST LOW 20 MIN: CPT | Performed by: INTERNAL MEDICINE

## 2020-07-06 NOTE — PROGRESS NOTES
Telehealth outside of [address] Verbal Consent   I conducted a telehealth visit with Tejas Oliveira today, 07/06/20, which was completed using two-way, real-time interactive audio and video communication.  This has been done in good tien to [employer] time.   + FH of thyroid problems.      She had a NM thyroid scan and uptake:   Nuclear thyroid study demonstrates normal RAIU with focally increased uptake at the lower pole left thyroid lobe concerning for solitary autonomous nodule    DUTTON was suggested, h normal, normal affect  Skin: No visible lesions  Extremities: no obvious extremity swelling, no lesions    DATA:     Pertinent data reviewed    ASSESSMENT AND PLAN:      Patient is a 25year old female with a hot thyroid nodule.    She had a low TSH, but mo been done in good tien to provide continuity of care in the best interest of the provider-patient relationship, due to the ongoing public health crisis/national emergency and because of restrictions of visitation.   There are limitations of this visit as n

## 2020-07-06 NOTE — TELEPHONE ENCOUNTER
Pt states she can not make her video appt to day at 3:15 and wants to know if it could be moved to 4:00 pm . Please advise

## 2020-07-06 NOTE — PATIENT INSTRUCTIONS
Consider: Niels Abel, Elli Basket- a month    Values: Better health, Confidence, Energy     Practice Intermittent Fasting consistently. Aim to fast at least 12 hours overnight.   Eat/drink between the hours of 7 am and 7 pm. Avoid food/drink after 7 pm.  Dri 15 grams of fiber - add this to your lunch salad. 1/2 cup cooked oatmeal is 4 grams of fiber; add in 1/2 cup raspberries for an additional 4 grams of fiber and  2 tbsp of flaxseed ground for another  4 grams of fiber.     Bring in vegetable soup prior to free after that (ex. greens, peppers, broccoli, cauliflower). Aim for 65-80 g protein per day. Aim for 3-4 servings of healthy fats: olives, fatty fish, olive oil, seeds, nuts, avocado, coconut oil.

## 2020-07-06 NOTE — TELEPHONE ENCOUNTER
Since she is the last patient of the day, I will not be in the office at 4 pm.   Hence, we can reschedule to tmrw/ we can do at 3:30 today  Thanks

## 2020-07-06 NOTE — PROGRESS NOTES
The Wellness and Weight Loss Consultation Note       Date of Consult:  2020    Patient:  Kourtney West  :      8/15/1995  MRN:      [id number]    Referring Provider: Dr. Felisha Pelletier       Chief Complaint:  Patient presents with:  Consult  Weight Manage Medications:    Current Outpatient Medications   Medication Sig Dispense Refill   • Ketoconazole 2 % External Shampoo Apply 1 mL topically twice a week. 120 mL 1       Allergies:  Patient has no known allergies.      Comorbidities: Prediabetes, Hypertriglyc Sleep Concern: Not Asked        Stress Concern: Not Asked        Weight Concern: Not Asked        Special Diet: Not Asked        Back Care: Not Asked        Exercise: Not Asked        Bike Helmet: Not Asked        Seat Belt: Not Asked        Self-Exams: daily    Behavior Modifications Reviewed and Discussed:    Eat breakfast, Eat 3 meals per day, Plan meals in advance, Read nutrition labels, Drink 64oz of water per day, Maintain a daily food journal, Utilize portion control strategies to reduce calorie in BMI of 45.0-49.9, adult [facility])    PLAN     Patient is not interested in bariatric surgery. Patient desires to pursue medical weight loss at this time.     Diagnoses and all orders for this visit:    Prediabetes  -     [address], DIET (INTER food log, aim for 100 grams carbs/day. Meet with ND.   2. Drink 64 ounces of non-caloric beverages per day. No fruit juices or regular soda. 3. Aim for 150 minutes moderate exercise per week. 4. Increase fruit and vegetable servings to 5-6 per day.

## 2020-11-02 ENCOUNTER — HOSPITAL ENCOUNTER (OUTPATIENT)
Age: 25
Discharge: HOME | End: 2020-11-02
Payer: COMMERCIAL

## 2020-11-02 VITALS
RESPIRATION RATE: 16 BRPM | DIASTOLIC BLOOD PRESSURE: 81 MMHG | TEMPERATURE: 97 F | OXYGEN SATURATION: 100 % | SYSTOLIC BLOOD PRESSURE: 153 MMHG | HEART RATE: 85 BPM

## 2020-11-02 DIAGNOSIS — J02.9 ACUTE SORE THROAT: Primary | ICD-10-CM

## 2020-11-02 PROCEDURE — 99213 OFFICE O/P EST LOW 20 MIN: CPT

## 2020-11-02 PROCEDURE — 87430 STREP A AG IA: CPT

## 2020-11-02 NOTE — ED INITIAL ASSESSMENT (HPI)
3-4 days of cough, body aches, and sore throat.   Patient started today with diarrhea  Patient's brother positive for Strep 3 weeks ago  Patient wants a COVID test as well

## 2020-11-02 NOTE — ED PROVIDER NOTES
Patient Seen in: [facility]      History   Patient presents with:  Sore Throat    Stated Complaint: covid and strep test    HPI    This is a well-appearing 17-year-old female who presents with a chief complaint of cough, body aches sore throa None (Room air)       Current:/81   Pulse 85   Temp 97.4 °F (36.3 °C) (Temporal)   Resp 16   LMP 10/08/2020 (Exact Date)   SpO2 100%         Physical Exam  Vitals signs and nursing note reviewed. Constitutional:       General: She is awake.       Ap discharge    Rapid strep urine is negative. MDM        SPO2 100% on room air which is normal.     Patient is very well-appearing exam, nontoxic appearance but exam is notable.   I discussed with patient the need for close follow-up with primary care prov

## 2020-11-05 ENCOUNTER — TELEMEDICINE (OUTPATIENT)
Dept: FAMILY MEDICINE CLINIC | Facility: CLINIC | Age: 25
End: 2020-11-05

## 2020-11-05 DIAGNOSIS — J02.9 SORE THROAT: Primary | ICD-10-CM

## 2020-11-05 DIAGNOSIS — R73.03 PREDIABETES: ICD-10-CM

## 2020-11-05 DIAGNOSIS — E55.9 VITAMIN D DEFICIENCY: ICD-10-CM

## 2020-11-05 DIAGNOSIS — Z00.00 WELL ADULT EXAM: ICD-10-CM

## 2020-11-05 PROCEDURE — 99213 OFFICE O/P EST LOW 20 MIN: CPT | Performed by: FAMILY MEDICINE

## 2020-11-05 NOTE — PROGRESS NOTES
This visit is conducted using Telemedicine with live, interactive video and audio during this Coronavirus pandemic. Please note that the following visit was completed using two-way, real-time interactive audio and/or video communication.   This has been telehealth platform. The patient was made aware of where to find [facility] notice of privacy practices, telehealth consent form and other related consent forms and documents. which are located on the [facility] website.  The patient verbally agreed to telehealth co 1    ALLERGY LIST  No Known Allergies    Allergies:No Known Allergies    PHYSICAL EXAM:     Vitals signs taken at home if available:    LMP 10/08/2020 (Exact Date)       Limited examination for this telephone/ video visit due to the coronavirus emergency Referrals:  None      Duration of the service: 9 mins 18 secs      John Polanco MD    11/5/2020  10:05 AM

## 2020-11-23 ENCOUNTER — TELEPHONE (OUTPATIENT)
Dept: FAMILY MEDICINE CLINIC | Facility: CLINIC | Age: 25
End: 2020-11-23

## 2020-11-23 ENCOUNTER — PATIENT MESSAGE (OUTPATIENT)
Dept: ENDOCRINOLOGY CLINIC | Facility: CLINIC | Age: 25
End: 2020-11-23

## 2020-11-23 ENCOUNTER — LAB ENCOUNTER (OUTPATIENT)
Dept: LAB | Facility: HOSPITAL | Age: 25
End: 2020-11-23
Attending: FAMILY MEDICINE
Payer: COMMERCIAL

## 2020-11-23 DIAGNOSIS — R73.03 PREDIABETES: ICD-10-CM

## 2020-11-23 DIAGNOSIS — Z00.00 WELL ADULT EXAM: ICD-10-CM

## 2020-11-23 DIAGNOSIS — E04.1 THYROID NODULE: Primary | ICD-10-CM

## 2020-11-23 DIAGNOSIS — E55.9 VITAMIN D DEFICIENCY: ICD-10-CM

## 2020-11-23 DIAGNOSIS — E04.1 HOT THYROID NODULE: ICD-10-CM

## 2020-11-23 PROCEDURE — 85025 COMPLETE CBC W/AUTO DIFF WBC: CPT

## 2020-11-23 PROCEDURE — 80053 COMPREHEN METABOLIC PANEL: CPT

## 2020-11-23 PROCEDURE — 80061 LIPID PANEL: CPT

## 2020-11-23 PROCEDURE — 82306 VITAMIN D 25 HYDROXY: CPT

## 2020-11-23 PROCEDURE — 84443 ASSAY THYROID STIM HORMONE: CPT

## 2020-11-23 PROCEDURE — 36415 COLL VENOUS BLD VENIPUNCTURE: CPT

## 2020-11-23 PROCEDURE — 83036 HEMOGLOBIN GLYCOSYLATED A1C: CPT

## 2020-11-23 NOTE — TELEPHONE ENCOUNTER
From: Andrez Caputo  To: Marily Caba MD  Sent: 11/23/2020 4:53 PM CST  Subject: Test Results Question    I have a question about TSH W REFLEX TO FREE T4 resulted on 11/23/20, 1:40 PM.    Awesome!  So as far as the nodule I have is that something that

## 2020-11-24 NOTE — TELEPHONE ENCOUNTER
Please see below response from patient. Care Everywhere does not show any ultrasound from a different facility.  Pended thyroid ultrasound

## 2020-11-25 ENCOUNTER — HOSPITAL ENCOUNTER (OUTPATIENT)
Dept: ULTRASOUND IMAGING | Age: 25
Discharge: HOME | End: 2020-11-25
Attending: INTERNAL MEDICINE
Payer: COMMERCIAL

## 2020-11-25 DIAGNOSIS — E04.1 THYROID NODULE: ICD-10-CM

## 2020-11-25 PROCEDURE — 76536 US EXAM OF HEAD AND NECK: CPT | Performed by: INTERNAL MEDICINE

## 2020-12-01 ENCOUNTER — PATIENT MESSAGE (OUTPATIENT)
Dept: ENDOCRINOLOGY CLINIC | Facility: CLINIC | Age: 25
End: 2020-12-01

## 2020-12-01 NOTE — TELEPHONE ENCOUNTER
From: Severiano Lu  To: Lisa Carlson MD  Sent: 12/1/2020 10:17 AM CST  Subject: Test Results Question    What time tomorrow in the evening do you have available?

## 2020-12-02 ENCOUNTER — TELEMEDICINE (OUTPATIENT)
Dept: ENDOCRINOLOGY CLINIC | Facility: CLINIC | Age: 25
End: 2020-12-02

## 2020-12-02 ENCOUNTER — OFFICE VISIT (OUTPATIENT)
Dept: FAMILY MEDICINE CLINIC | Facility: CLINIC | Age: 25
End: 2020-12-02

## 2020-12-02 ENCOUNTER — TELEPHONE (OUTPATIENT)
Dept: ENDOCRINOLOGY CLINIC | Facility: CLINIC | Age: 25
End: 2020-12-02

## 2020-12-02 VITALS
WEIGHT: 256 LBS | HEIGHT: 62 IN | TEMPERATURE: 99 F | SYSTOLIC BLOOD PRESSURE: 141 MMHG | HEART RATE: 84 BPM | DIASTOLIC BLOOD PRESSURE: 83 MMHG | BODY MASS INDEX: 47.11 KG/M2

## 2020-12-02 DIAGNOSIS — N92.6 IRREGULAR PERIODS/MENSTRUAL CYCLES: ICD-10-CM

## 2020-12-02 DIAGNOSIS — R73.03 PREDIABETES: ICD-10-CM

## 2020-12-02 DIAGNOSIS — E88.81 INSULIN RESISTANCE: ICD-10-CM

## 2020-12-02 DIAGNOSIS — E66.01 MORBID OBESITY WITH BMI OF 45.0-49.9, ADULT (HCC): ICD-10-CM

## 2020-12-02 DIAGNOSIS — E78.1 HYPERTRIGLYCERIDEMIA: ICD-10-CM

## 2020-12-02 DIAGNOSIS — E55.9 VITAMIN D DEFICIENCY: ICD-10-CM

## 2020-12-02 DIAGNOSIS — R63.5 WEIGHT GAIN: ICD-10-CM

## 2020-12-02 DIAGNOSIS — L83 ACANTHOSIS NIGRICANS: ICD-10-CM

## 2020-12-02 DIAGNOSIS — E88.81 INSULIN RESISTANCE: Primary | ICD-10-CM

## 2020-12-02 DIAGNOSIS — Z00.00 WELL ADULT EXAM: Primary | ICD-10-CM

## 2020-12-02 DIAGNOSIS — E04.1 THYROID NODULE: Primary | ICD-10-CM

## 2020-12-02 DIAGNOSIS — R77.1 ELEVATED SERUM GLOBULIN LEVEL: ICD-10-CM

## 2020-12-02 PROBLEM — R79.89 LOW TSH LEVEL: Status: RESOLVED | Noted: 2019-10-28 | Resolved: 2020-12-02

## 2020-12-02 PROCEDURE — 3008F BODY MASS INDEX DOCD: CPT | Performed by: FAMILY MEDICINE

## 2020-12-02 PROCEDURE — 90686 IIV4 VACC NO PRSV 0.5 ML IM: CPT | Performed by: FAMILY MEDICINE

## 2020-12-02 PROCEDURE — 3077F SYST BP >= 140 MM HG: CPT | Performed by: FAMILY MEDICINE

## 2020-12-02 PROCEDURE — 99395 PREV VISIT EST AGE 18-39: CPT | Performed by: FAMILY MEDICINE

## 2020-12-02 PROCEDURE — 3079F DIAST BP 80-89 MM HG: CPT | Performed by: FAMILY MEDICINE

## 2020-12-02 PROCEDURE — 99213 OFFICE O/P EST LOW 20 MIN: CPT | Performed by: INTERNAL MEDICINE

## 2020-12-02 PROCEDURE — 90471 IMMUNIZATION ADMIN: CPT | Performed by: FAMILY MEDICINE

## 2020-12-02 RX ORDER — ERGOCALCIFEROL (VITAMIN D2) 1250 MCG
CAPSULE ORAL
COMMUNITY
End: 2020-12-02

## 2020-12-02 RX ORDER — ERGOCALCIFEROL 1.25 MG/1
50000 CAPSULE ORAL WEEKLY
Qty: 12 CAPSULE | Refills: 3 | Status: SHIPPED | OUTPATIENT
Start: 2020-12-02 | End: 2021-01-01

## 2020-12-02 NOTE — PROGRESS NOTES
Telehealth outside of [address] Verbal Consent   I conducted a telehealth visit with Gael Goltz today, 12/02/20, which was completed using two-way, real-time interactive audio and video communication.  This has been done in good tien to [employer] on the right side      Personal or Family history of thyroid cancer: denies  Personal or family history of MEN: denies  Exposure to head and neck radiation before age 21: denies  Compressive symptoms (difficulty in breathing or swallowing): denies  On anti speech  Back: no kyphosis  Respiratory:  Speaking in full sentences, non-labored.  no increased work of breathing, no audible wheezing    Skin:  normal moisture and skin texture, no visible lesions  Hair and nails: normal scalp hair  Hematologic:  no excess emergency and because of restrictions of visitation. There are limitations of this visit as no physical exam could be performed. Every conscious effort was taken to allow for sufficient and adequate time.   This billing was spent on reviewing labs, medica

## 2020-12-02 NOTE — TELEPHONE ENCOUNTER
Hello,  Need a favor  I am seeing the patient via video visit at 10:15 am today  Could you please put in a referral for her  Sorry for the last minute, we just made the appointment today  Thanks!

## 2020-12-03 ENCOUNTER — TELEPHONE (OUTPATIENT)
Dept: ENDOCRINOLOGY CLINIC | Facility: CLINIC | Age: 25
End: 2020-12-03

## 2020-12-03 DIAGNOSIS — E04.1 THYROID NODULE: Primary | ICD-10-CM

## 2020-12-03 DIAGNOSIS — Z01.812 ENCOUNTER FOR PREPROCEDURE SCREENING LABORATORY TESTING FOR COVID-19: ICD-10-CM

## 2020-12-03 DIAGNOSIS — Z20.822 ENCOUNTER FOR PREPROCEDURE SCREENING LABORATORY TESTING FOR COVID-19: ICD-10-CM

## 2020-12-03 NOTE — TELEPHONE ENCOUNTER
Chart reviewed, patient had video visit yesterday 12/02/20 and FNA was discussed. RN called patient and gave her the central scheduling and interventional radiology phone number.   Was advised that order will be put in by Dr. Ioana Valadez upon her return [facility]

## 2020-12-03 NOTE — TELEPHONE ENCOUNTER
Agree with ENt recs  Order signed  She will need COVID test prior to procedure  Ordered  Central scheduling will help schedule  Thanks

## 2020-12-03 NOTE — TELEPHONE ENCOUNTER
Pt called to let  [facility] know that she would like to go forward with the biopsy. She would also like to know the name of surgeon. Please call.

## 2020-12-04 NOTE — TELEPHONE ENCOUNTER
Left message to call back as call went straight to voicemail. RN also sent [employer] message as it shows she actively uses it.

## 2020-12-09 ENCOUNTER — OFFICE VISIT (OUTPATIENT)
Dept: HEMATOLOGY/ONCOLOGY | Facility: HOSPITAL | Age: 25
End: 2020-12-09
Attending: INTERNAL MEDICINE
Payer: COMMERCIAL

## 2020-12-09 VITALS
RESPIRATION RATE: 16 BRPM | BODY MASS INDEX: 47.06 KG/M2 | SYSTOLIC BLOOD PRESSURE: 125 MMHG | TEMPERATURE: 98 F | HEIGHT: 62.01 IN | DIASTOLIC BLOOD PRESSURE: 65 MMHG | OXYGEN SATURATION: 97 % | HEART RATE: 93 BPM | WEIGHT: 259 LBS

## 2020-12-09 DIAGNOSIS — N92.6 IRREGULAR MENSTRUAL CYCLE: ICD-10-CM

## 2020-12-09 DIAGNOSIS — Z87.42 HISTORY OF PCOS: ICD-10-CM

## 2020-12-09 DIAGNOSIS — R77.8 ELEVATED TOTAL PROTEIN: ICD-10-CM

## 2020-12-09 DIAGNOSIS — R77.1 ELEVATED SERUM GLOBULIN LEVEL: Primary | ICD-10-CM

## 2020-12-09 DIAGNOSIS — R21 RASH IN ADULT: ICD-10-CM

## 2020-12-09 PROCEDURE — 99244 OFF/OP CNSLTJ NEW/EST MOD 40: CPT | Performed by: INTERNAL MEDICINE

## 2020-12-09 NOTE — PROGRESS NOTES
Hematology Consultation Note    Patient Name: Sanjiv Torrez   YOB: 1995   Medical Record Number: [id number]   CSN: [id number]   Consulting Physician: Anish Aguilera MD  Referring Physician(s): John Polanco  Date of Consultation: 12/9/2020 Diabetes Paternal Grandfather    • Diabetes Paternal Grandmother    • Cancer Paternal Grandmother    • Thyroid disease Maternal Grandmother    • Heart Disorder Neg    • Hypertension Neg    • Anemia Neg    • Bleeding Disorders Neg    • Clotting Disorder Neg Concern: Not Asked        Stress Concern: Not Asked        Weight Concern: Not Asked        Special Diet: Not Asked        Back Care: Not Asked        Exercise: Not Asked        Bike Helmet: Not Asked        Seat Belt: Not Asked        Self-Exams: Not Aske oriented x 3, not in acute distress. Psych:  Friendly, cooperative with appropriate questions and responses  HEENT: EOMs intact. Oropharynx is clear. Neck: No palpable lymphadenopathy. Neck is supple. Lymphatics:  There is no palpable lymphadenopathy th PROTEIN ELECTROPHORESIS, IMMUNOFIXATION [        Brenda Sieving A/G/M, QUANT,         IMMUNOGLOBULIN FREE LT CHAINS BLOOD, BENCE         GANT PROTEIN, 24 HOUR URINE PANEL, XR BONE         SURVEY, COMPLETE (CPT=77075), CBC WITH         DIFFERENTIAL W [facility]  [address] [address]  [address] [facility]

## 2020-12-11 ENCOUNTER — LAB ENCOUNTER (OUTPATIENT)
Dept: LAB | Age: 25
End: 2020-12-11
Attending: INTERNAL MEDICINE
Payer: COMMERCIAL

## 2020-12-11 ENCOUNTER — TELEPHONE (OUTPATIENT)
Dept: HEMATOLOGY/ONCOLOGY | Facility: HOSPITAL | Age: 25
End: 2020-12-11

## 2020-12-11 ENCOUNTER — HOSPITAL ENCOUNTER (OUTPATIENT)
Dept: GENERAL RADIOLOGY | Age: 25
Discharge: HOME | End: 2020-12-11
Attending: INTERNAL MEDICINE
Payer: COMMERCIAL

## 2020-12-11 DIAGNOSIS — R77.8 ELEVATED TOTAL PROTEIN: ICD-10-CM

## 2020-12-11 DIAGNOSIS — R77.1 ELEVATED SERUM GLOBULIN LEVEL: ICD-10-CM

## 2020-12-11 DIAGNOSIS — R21 RASH IN ADULT: ICD-10-CM

## 2020-12-11 PROCEDURE — 84165 PROTEIN E-PHORESIS SERUM: CPT

## 2020-12-11 PROCEDURE — 86235 NUCLEAR ANTIGEN ANTIBODY: CPT

## 2020-12-11 PROCEDURE — 80053 COMPREHEN METABOLIC PANEL: CPT

## 2020-12-11 PROCEDURE — 83883 ASSAY NEPHELOMETRY NOT SPEC: CPT

## 2020-12-11 PROCEDURE — 85025 COMPLETE CBC W/AUTO DIFF WBC: CPT

## 2020-12-11 PROCEDURE — 86039 ANTINUCLEAR ANTIBODIES (ANA): CPT

## 2020-12-11 PROCEDURE — 86334 IMMUNOFIX E-PHORESIS SERUM: CPT

## 2020-12-11 PROCEDURE — 86335 IMMUNFIX E-PHORSIS/URINE/CSF: CPT

## 2020-12-11 PROCEDURE — 82784 ASSAY IGA/IGD/IGG/IGM EACH: CPT

## 2020-12-11 PROCEDURE — 84166 PROTEIN E-PHORESIS/URINE/CSF: CPT

## 2020-12-11 PROCEDURE — 84156 ASSAY OF PROTEIN URINE: CPT

## 2020-12-11 PROCEDURE — 36415 COLL VENOUS BLD VENIPUNCTURE: CPT

## 2020-12-11 PROCEDURE — 77075 RADEX OSSEOUS SURVEY COMPL: CPT | Performed by: INTERNAL MEDICINE

## 2020-12-11 PROCEDURE — 86225 DNA ANTIBODY NATIVE: CPT

## 2020-12-11 PROCEDURE — 86038 ANTINUCLEAR ANTIBODIES: CPT

## 2020-12-11 NOTE — TELEPHONE ENCOUNTER
Call returned to patient. We confirmed instructions on 24 hour urine collection, she has collected properly and will bring specimen to [place] location when she arrives for x ray and blood work. She thanked me for the call.

## 2020-12-11 NOTE — TELEPHONE ENCOUNTER
Patient have a question regarding her 24 hr urine specimen and she need to know when should she stop collecting it this morning.

## 2020-12-18 ENCOUNTER — OFFICE VISIT (OUTPATIENT)
Dept: HEMATOLOGY/ONCOLOGY | Facility: HOSPITAL | Age: 25
End: 2020-12-18
Attending: INTERNAL MEDICINE
Payer: COMMERCIAL

## 2020-12-18 VITALS
HEART RATE: 76 BPM | OXYGEN SATURATION: 100 % | HEIGHT: 62.01 IN | DIASTOLIC BLOOD PRESSURE: 69 MMHG | BODY MASS INDEX: 46.51 KG/M2 | WEIGHT: 256 LBS | SYSTOLIC BLOOD PRESSURE: 122 MMHG | RESPIRATION RATE: 16 BRPM | TEMPERATURE: 98 F

## 2020-12-18 DIAGNOSIS — R76.8 ELEVATED SERUM IMMUNOGLOBULIN FREE LIGHT CHAIN LEVEL: Primary | ICD-10-CM

## 2020-12-18 PROCEDURE — 99214 OFFICE O/P EST MOD 30 MIN: CPT | Performed by: INTERNAL MEDICINE

## 2020-12-18 RX ORDER — MEDROXYPROGESTERONE ACETATE 10 MG/1
TABLET ORAL
COMMUNITY
End: 2021-03-25 | Stop reason: ALTCHOICE

## 2020-12-18 NOTE — PROGRESS NOTES
Hematology Progress Note    Patient Name: Ivonne Truong   YOB: 1995   Medical Record Number: [id number]   CSN: [id number]   Consulting Physician: Shamar Swan MD  Referring Physician(s): John Polanco  Date of Visit: 12/18/2020     Chief comp bleeding complications   • REMOVAL GALLBLADDER  2010   • TONSILLECTOMY  1999       Family Medical History:  Family History   Problem Relation Age of Onset   • Other (Other) Mother    • Thyroid disease Father    • Diabetes Paternal Grandfather    • Diabetes  Service: Not Asked        Blood Transfusions: Not Asked        Caffeine Concern: Yes          1 cup soda/coffee per day        Occupational Exposure: Not Asked        Hobby Hazards: Not Asked        Sleep Concern: Not Asked        Stress Concern: positives and negatives noted in the the HPI.      Vital Signs:  /69 (BP Location: Left arm, Patient Position: Sitting, Cuff Size: large)   Pulse 76   Temp 97.6 °F (36.4 °C) (Oral)   Resp 16   Ht 1.575 m (5' 2.01\")   Wt 116.1 kg (256 lb)   LMP 10/10/ Impression:    (R76.8) Elevated serum immunoglobulin free light chain level  (primary encounter diagnosis)  Plan: BENCE GANT PROTEIN, 24 HOUR URINE PANEL, CBC         WITH DIFFERENTIAL WITH PLATELET, COMP METABOLIC        PANEL (14), IMMUNOFIXATION [ [facility]  [address] [address]  [address] [facility]

## 2020-12-19 DIAGNOSIS — R76.8 POSITIVE ANA (ANTINUCLEAR ANTIBODY): Primary | ICD-10-CM

## 2020-12-19 DIAGNOSIS — R21 RASH OF FACE: ICD-10-CM

## 2020-12-20 ENCOUNTER — TELEPHONE (OUTPATIENT)
Dept: FAMILY MEDICINE CLINIC | Facility: CLINIC | Age: 25
End: 2020-12-20

## 2020-12-20 NOTE — TELEPHONE ENCOUNTER
Author: Yared Hopkins MD Service: Loye Luke Type: Physician   Filed: 12/19/2020 12:30 PM Encounter Date: 12/18/2020 Status: Signed   : Yared Hopkins MD (Physician)        Show:Clear all  [x]Manual[]Template[]Copied    Added by:  [x]Neymar Polanco MD

## 2020-12-21 NOTE — TELEPHONE ENCOUNTER
Patient returned call.  Gave her rheumatology referral and transferred call to [address] [address], [address] [facility], [address]  [id number] [place] [address] [phone]

## 2021-01-03 ENCOUNTER — LAB ENCOUNTER (OUTPATIENT)
Dept: LAB | Facility: HOSPITAL | Age: 26
End: 2021-01-03
Attending: INTERNAL MEDICINE
Payer: COMMERCIAL

## 2021-01-03 DIAGNOSIS — Z01.812 ENCOUNTER FOR PREPROCEDURE SCREENING LABORATORY TESTING FOR COVID-19: ICD-10-CM

## 2021-01-03 DIAGNOSIS — Z20.822 ENCOUNTER FOR PREPROCEDURE SCREENING LABORATORY TESTING FOR COVID-19: ICD-10-CM

## 2021-01-04 LAB — SARS-COV-2 RNA RESP QL NAA+PROBE: NOT DETECTED

## 2021-01-06 ENCOUNTER — HOSPITAL ENCOUNTER (OUTPATIENT)
Dept: ULTRASOUND IMAGING | Facility: HOSPITAL | Age: 26
Discharge: HOME | End: 2021-01-06
Attending: INTERNAL MEDICINE
Payer: COMMERCIAL

## 2021-01-06 VITALS — RESPIRATION RATE: 16 BRPM | DIASTOLIC BLOOD PRESSURE: 73 MMHG | SYSTOLIC BLOOD PRESSURE: 116 MMHG | HEART RATE: 91 BPM

## 2021-01-06 DIAGNOSIS — E04.1 THYROID NODULE: ICD-10-CM

## 2021-01-06 PROCEDURE — 88172 CYTP DX EVAL FNA 1ST EA SITE: CPT | Performed by: INTERNAL MEDICINE

## 2021-01-06 PROCEDURE — 88177 CYTP FNA EVAL EA ADDL: CPT | Performed by: INTERNAL MEDICINE

## 2021-01-06 PROCEDURE — 10005 FNA BX W/US GDN 1ST LES: CPT | Performed by: INTERNAL MEDICINE

## 2021-01-06 PROCEDURE — 88173 CYTOPATH EVAL FNA REPORT: CPT | Performed by: INTERNAL MEDICINE

## 2021-01-06 NOTE — IMAGING NOTE
1250 Pt arrived to ultrasound room #2    1253 Scans taken by [facility], ultrasound  sonographer     308 History taken and as follows:  Family Hx thyroid disease. 1259 Procedure explained questions answered.     1300 Consent verified and obtained      13

## 2021-01-16 ENCOUNTER — OFFICE VISIT (OUTPATIENT)
Dept: RHEUMATOLOGY | Facility: CLINIC | Age: 26
End: 2021-01-16

## 2021-01-16 ENCOUNTER — PATIENT MESSAGE (OUTPATIENT)
Dept: ENDOCRINOLOGY CLINIC | Facility: CLINIC | Age: 26
End: 2021-01-16

## 2021-01-16 VITALS
HEART RATE: 91 BPM | DIASTOLIC BLOOD PRESSURE: 84 MMHG | RESPIRATION RATE: 16 BRPM | HEIGHT: 62.01 IN | BODY MASS INDEX: 46.51 KG/M2 | SYSTOLIC BLOOD PRESSURE: 119 MMHG | WEIGHT: 256 LBS

## 2021-01-16 DIAGNOSIS — M79.10 MYALGIA: ICD-10-CM

## 2021-01-16 DIAGNOSIS — R76.8 POSITIVE ANA (ANTINUCLEAR ANTIBODY): Primary | ICD-10-CM

## 2021-01-16 PROCEDURE — 99244 OFF/OP CNSLTJ NEW/EST MOD 40: CPT | Performed by: INTERNAL MEDICINE

## 2021-01-16 PROCEDURE — 3008F BODY MASS INDEX DOCD: CPT | Performed by: INTERNAL MEDICINE

## 2021-01-16 PROCEDURE — 3079F DIAST BP 80-89 MM HG: CPT | Performed by: INTERNAL MEDICINE

## 2021-01-16 PROCEDURE — 3074F SYST BP LT 130 MM HG: CPT | Performed by: INTERNAL MEDICINE

## 2021-01-16 RX ORDER — ERGOCALCIFEROL 1.25 MG/1
CAPSULE ORAL
COMMUNITY

## 2021-01-16 NOTE — PROGRESS NOTES
Jose Enrique Pérez is a 22year old female who presents for Patient presents with:  Consult: Positive ZEV (antinuclear antibody) , Rash of face  . HPI:     I had the pleasure of seeing Jose Enrique Pérez on 1/16/2021 for evaluation.      She is a pleasant 25 ye medroxyprogesterone 10 mg tablet   TAKE 1 TABLET BY MOUTH EVERY DAY     • Ketoconazole 2 % External Shampoo Apply 1 mL topically twice a week.  120 mL 1      Past Medical History:   Diagnosis Date   • Asthma    • Lipid screening 8/21/10    per NG   • Morbid abominal pain, no hx of ulcer, no gastritis, no heartburn, no dyshpagia, no BRBPR or melena  : no dysuria, no hx of miscarriages, no DVT Hx, no hx of OCP,   She had a c section. Towards the end of pregnancy she had higher bp but no protein in uring.    Sh % 0.7   Immature Granulocyte %      % 0.2   Glucose      70 - 99 mg/dL 99   Sodium      136 - 145 mmol/L 140   Potassium      3.5 - 5.1 mmol/L 3.7   Chloride      98 - 112 mmol/L 106   Carbon Dioxide, Total      21.0 - 32.0 mmol/L 28.0   ANION GAP 279.0 mg/dL 139.0   KAPPA FREE LIGHT CHAIN      0.330 - 1.940 mg/dL 2.336 (H)   LAMBDA FREE LIGHT CHAIN      0.571 - 2.630 mg/dL 1.851   KAPPA/LAMBDA FLC RATIO      0.26 - 1.65 1.26     Component      Latest Ref Rng & Units 12/11/2020           1:32 PM   I

## 2021-01-17 NOTE — TELEPHONE ENCOUNTER
From: Damon Cuellar  To: Maicol Morse MD  Sent: 1/16/2021 8:15 AM CST  Subject: Test Results Question    I have a question about US FNA THYROID, GUIDE INCLD, FIRST LESION (CPT=10005) resulted on 1/9/21, 8:38 PM.    Geovanni Locke!  So schedule an appointment i

## 2021-03-25 ENCOUNTER — LAB ENCOUNTER (OUTPATIENT)
Dept: LAB | Facility: HOSPITAL | Age: 26
End: 2021-03-25
Attending: INTERNAL MEDICINE
Payer: COMMERCIAL

## 2021-03-25 ENCOUNTER — OFFICE VISIT (OUTPATIENT)
Dept: SURGERY | Facility: CLINIC | Age: 26
End: 2021-03-25

## 2021-03-25 VITALS
HEIGHT: 62.1 IN | SYSTOLIC BLOOD PRESSURE: 130 MMHG | BODY MASS INDEX: 46.88 KG/M2 | WEIGHT: 258 LBS | DIASTOLIC BLOOD PRESSURE: 80 MMHG

## 2021-03-25 DIAGNOSIS — E88.81 INSULIN RESISTANCE: ICD-10-CM

## 2021-03-25 DIAGNOSIS — R76.8 POSITIVE ANA (ANTINUCLEAR ANTIBODY): ICD-10-CM

## 2021-03-25 DIAGNOSIS — E66.01 MORBID OBESITY WITH BMI OF 45.0-49.9, ADULT (HCC): ICD-10-CM

## 2021-03-25 DIAGNOSIS — M79.10 MYALGIA: ICD-10-CM

## 2021-03-25 DIAGNOSIS — R73.03 PREDIABETES: ICD-10-CM

## 2021-03-25 DIAGNOSIS — E55.9 VITAMIN D DEFICIENCY: ICD-10-CM

## 2021-03-25 DIAGNOSIS — R73.03 PREDIABETES: Primary | ICD-10-CM

## 2021-03-25 LAB
CK SERPL-CCNC: 120 U/L
CRP SERPL-MCNC: 2 MG/DL (ref ?–0.3)
ERYTHROCYTE [SEDIMENTATION RATE] IN BLOOD: 39 MM/HR
RHEUMATOID FACT SERPL-ACNC: <10 IU/ML (ref ?–15)

## 2021-03-25 PROCEDURE — 3075F SYST BP GE 130 - 139MM HG: CPT | Performed by: INTERNAL MEDICINE

## 2021-03-25 PROCEDURE — 85652 RBC SED RATE AUTOMATED: CPT

## 2021-03-25 PROCEDURE — 86800 THYROGLOBULIN ANTIBODY: CPT

## 2021-03-25 PROCEDURE — 86376 MICROSOMAL ANTIBODY EACH: CPT

## 2021-03-25 PROCEDURE — 86431 RHEUMATOID FACTOR QUANT: CPT

## 2021-03-25 PROCEDURE — 86235 NUCLEAR ANTIGEN ANTIBODY: CPT

## 2021-03-25 PROCEDURE — 93010 ELECTROCARDIOGRAM REPORT: CPT | Performed by: INTERNAL MEDICINE

## 2021-03-25 PROCEDURE — 3008F BODY MASS INDEX DOCD: CPT | Performed by: INTERNAL MEDICINE

## 2021-03-25 PROCEDURE — 82550 ASSAY OF CK (CPK): CPT

## 2021-03-25 PROCEDURE — 82397 CHEMILUMINESCENT ASSAY: CPT

## 2021-03-25 PROCEDURE — 3079F DIAST BP 80-89 MM HG: CPT | Performed by: INTERNAL MEDICINE

## 2021-03-25 PROCEDURE — 82085 ASSAY OF ALDOLASE: CPT

## 2021-03-25 PROCEDURE — 36415 COLL VENOUS BLD VENIPUNCTURE: CPT

## 2021-03-25 PROCEDURE — 99214 OFFICE O/P EST MOD 30 MIN: CPT | Performed by: INTERNAL MEDICINE

## 2021-03-25 PROCEDURE — 86140 C-REACTIVE PROTEIN: CPT

## 2021-03-25 PROCEDURE — 93005 ELECTROCARDIOGRAM TRACING: CPT

## 2021-03-25 RX ORDER — PHENTERMINE HYDROCHLORIDE 15 MG/1
15 CAPSULE ORAL EVERY MORNING
Qty: 30 CAPSULE | Refills: 2 | Status: SHIPPED | OUTPATIENT
Start: 2021-03-25

## 2021-03-25 RX ORDER — NORGESTIMATE AND ETHINYL ESTRADIOL 7DAYSX3 LO
KIT ORAL
COMMUNITY
End: 2021-11-29

## 2021-03-25 NOTE — PROGRESS NOTES
[address] [place] [address]  [address] [facility] [address]  Dept: [phone]       Patient:  Sanjiv Torrez  :      8/15/1995  MRN:      [id number]    Chief Complaint:  Patient prese education level: Not on file    Occupational History      Not on file    Tobacco Use      Smoking status: Never Smoker      Smokeless tobacco: Never Used    Vaping Use      Vaping Use: Never used    Substance and Sexual Activity      Alcohol use:  No Abused:   Surgical History:    Past Surgical History:   Procedure Laterality Date   •      •  SECTION N/A 2019    Performed by Guevara Demarco MD at [address] [place] [address] L+D OR   • DERMATOLOGICAL PROCEDURE      Facial abscess removed   • EACH ADD T Reviewed and Discussed    Needs to keep moving    ROS:    Constitutional: positive for fatigue  Respiratory: positive for dyspnea on exertion  Cardiovascular: negative  Gastrointestinal: negative  Musculoskeletal:negative  Neurological: negative  Behaviora palpitations or racing heart rate. She understands that I will not call in the prescription for her; she has to have an appointment to have the medication refilled.    Will start at 15 mg  Needs ekg    Needs support from     Aware she needs to discon

## 2021-03-26 ENCOUNTER — TELEPHONE (OUTPATIENT)
Dept: RHEUMATOLOGY | Facility: CLINIC | Age: 26
End: 2021-03-26

## 2021-03-26 LAB
THYROGLOB SERPL-MCNC: 91 U/ML (ref ?–60)
THYROPEROXIDASE AB SERPL-ACNC: 2476 U/ML (ref ?–60)

## 2021-03-26 NOTE — TELEPHONE ENCOUNTER
Component      Latest Ref Rng & Units 3/25/2021   ANTI-THYROPEROXIDASE      <60 U/mL 2,476 (H)      Called with new result. Corrected lab from yesterday.

## 2021-03-27 LAB — ALDOLASE, SERUM: 7.2 U/L

## 2021-03-28 LAB — SCLERODERMA (SCL-70) (ENA) AB, IGG: 2 AU/ML

## 2021-03-29 ENCOUNTER — PATIENT MESSAGE (OUTPATIENT)
Dept: RHEUMATOLOGY | Facility: CLINIC | Age: 26
End: 2021-03-29

## 2021-03-29 LAB — LEPTIN: 54.1 NG/ML

## 2021-03-29 NOTE — TELEPHONE ENCOUNTER
Dr Avelina Fall- please advise.     Positive antithyroid antibodiies   Written by Kimberley Garcia MD on 3/29/2021 12:34 AM CDT

## 2021-03-29 NOTE — TELEPHONE ENCOUNTER
From: Damon Cuellar  To: Blayne Lazo MD  Sent: 3/29/2021 12:38 AM CDT  Subject: Test Results Question    I have a question about THYROID PEROXIDASE (TPO) AB resulted on 3/26/21, 1:59 PM.       So what does this all mean?

## 2021-04-06 NOTE — TELEPHONE ENCOUNTER
Patient scheduled.   Future Appointments   Date Time Provider Judith Hare   4/13/2021  4:30 PM Dale He MD [facility]   6/18/2021 10:30 AM Ronald Dunbar MD [address] [place] [address] HEM ONC EMO   6/24/2021  4:30 PM [facility]

## 2021-04-13 ENCOUNTER — OFFICE VISIT (OUTPATIENT)
Dept: RHEUMATOLOGY | Facility: CLINIC | Age: 26
End: 2021-04-13

## 2021-04-13 VITALS
WEIGHT: 260 LBS | DIASTOLIC BLOOD PRESSURE: 77 MMHG | SYSTOLIC BLOOD PRESSURE: 146 MMHG | BODY MASS INDEX: 47.24 KG/M2 | RESPIRATION RATE: 16 BRPM | HEART RATE: 84 BPM | HEIGHT: 62.1 IN

## 2021-04-13 DIAGNOSIS — R80.9 PROTEINURIA, UNSPECIFIED TYPE: ICD-10-CM

## 2021-04-13 DIAGNOSIS — R70.0 ELEVATED SED RATE: ICD-10-CM

## 2021-04-13 DIAGNOSIS — R76.8 POSITIVE ANA (ANTINUCLEAR ANTIBODY): Primary | ICD-10-CM

## 2021-04-13 PROCEDURE — 3008F BODY MASS INDEX DOCD: CPT | Performed by: INTERNAL MEDICINE

## 2021-04-13 PROCEDURE — 99214 OFFICE O/P EST MOD 30 MIN: CPT | Performed by: INTERNAL MEDICINE

## 2021-04-13 PROCEDURE — 3078F DIAST BP <80 MM HG: CPT | Performed by: INTERNAL MEDICINE

## 2021-04-13 PROCEDURE — 3077F SYST BP >= 140 MM HG: CPT | Performed by: INTERNAL MEDICINE

## 2021-04-13 NOTE — PROGRESS NOTES
Paige Khoury is a 22year old female who presents for Patient presents with:  Lab Results  . HPI:     I had the pleasure of seeing Paige Khoury on 1/16/2021 for evaluation. She is a pleasant 22year old who has a positive ZEV 1:160.  She had josee shampoo is helping. She doesn't have it on her hair anymore. soemtiems it fluctuates. Wt Readings from Last 2 Encounters:  04/13/21 : 260 lb (117.9 kg)  03/25/21 : 258 lb (117 kg)    Body mass index is 47.4 kg/m².       Current Outpatient Medica SYSTEMS:   Review Of Systems:  Fatigue  Constitutional:No fever, no change in weight or appetitie  Derm: No rashes, no oral ulcers, no alopecia, no photosensitivity, no psoriasis  HEENT: No dry eyes, no dry mouth, no Raynaud's - sometimes her finger tips w g/dL 32.8   RDW-SD      35.1 - 46.3 fL 43.1   RDW      11.0 - 15.0 % 12.5   Platelet Count      572.3 - 450.0 10(3)uL 261.0   Prelim Neutrophil Abs      1.50 - 7.70 x10 (3) uL 7.10   Neutrophils Absolute      1.50 - 7.70 x10(3) uL 7.10   Lymphocytes Absolu PROTEIN      6.4 - 8.2 g/dL 8.3 (H)   Albumin      3.75 - 5.21 g/dL 4.47   ALPHA-1-GLOBULINS      0.19 - 0.46 g/dL 0.30   ALPHA-2-GLOBULINS      0.48 - 1.05 g/dL 0.85   BETA GLOBULINS      0.68 - 1.23 g/dL 0.91   GAMMA GLOBULINS      0.62 - 1.70 g/dL 1.77 (H)   ANTI-THYROGLOBULIN      <60 U/mL 91 (H)   Scleroderma (Scl-70) (RUDDY) Antibody, IgG      0 - 40 AU/mL 2   LEPTIN      0.5 - 15.2 ng/mL 54.1 (H)     ASSESSMENT AND PLAN:   Kourtney West is a 22year old female who presents for Patient presents with:

## 2021-06-18 ENCOUNTER — APPOINTMENT (OUTPATIENT)
Dept: HEMATOLOGY/ONCOLOGY | Facility: HOSPITAL | Age: 26
End: 2021-06-18
Attending: INTERNAL MEDICINE
Payer: COMMERCIAL

## 2021-06-24 ENCOUNTER — APPOINTMENT (OUTPATIENT)
Dept: HEMATOLOGY/ONCOLOGY | Facility: HOSPITAL | Age: 26
End: 2021-06-24
Attending: INTERNAL MEDICINE
Payer: COMMERCIAL

## 2021-06-24 ENCOUNTER — TELEPHONE (OUTPATIENT)
Dept: HEMATOLOGY/ONCOLOGY | Facility: HOSPITAL | Age: 26
End: 2021-06-24

## 2021-06-24 NOTE — TELEPHONE ENCOUNTER
Patient sent an after hour message to cancel her appointment for today 6/24/21 at 1030 am, No further information was given.

## 2021-06-24 NOTE — TELEPHONE ENCOUNTER
Spoke with patient earlier this morning- appointment cancelled for today because lab work was not completed as ordered. Pt is aware that she will also need another referral from PCP before rescheduling.

## 2021-07-01 ENCOUNTER — APPOINTMENT (OUTPATIENT)
Dept: HEMATOLOGY/ONCOLOGY | Facility: HOSPITAL | Age: 26
End: 2021-07-01
Attending: INTERNAL MEDICINE
Payer: COMMERCIAL

## 2021-08-05 ENCOUNTER — TELEPHONE (OUTPATIENT)
Dept: FAMILY MEDICINE CLINIC | Facility: CLINIC | Age: 26
End: 2021-08-05

## 2021-08-05 DIAGNOSIS — R77.1 ELEVATED SERUM GLOBULIN LEVEL: Primary | ICD-10-CM

## 2021-08-06 ENCOUNTER — TELEPHONE (OUTPATIENT)
Dept: CASE MANAGEMENT | Age: 26
End: 2021-08-06

## 2021-08-06 ENCOUNTER — HOSPITAL ENCOUNTER (OUTPATIENT)
Age: 26
Discharge: HOME | End: 2021-08-06
Payer: COMMERCIAL

## 2021-08-06 VITALS
SYSTOLIC BLOOD PRESSURE: 143 MMHG | HEIGHT: 62 IN | HEART RATE: 96 BPM | TEMPERATURE: 99 F | BODY MASS INDEX: 46.16 KG/M2 | OXYGEN SATURATION: 96 % | RESPIRATION RATE: 18 BRPM | DIASTOLIC BLOOD PRESSURE: 84 MMHG | WEIGHT: 250.81 LBS

## 2021-08-06 DIAGNOSIS — U07.1 COVID-19 VIRUS INFECTION: Primary | ICD-10-CM

## 2021-08-06 DIAGNOSIS — J02.0 STREPTOCOCCAL PHARYNGITIS: ICD-10-CM

## 2021-08-06 LAB
S PYO AG THROAT QL: POSITIVE
SARS-COV-2 RNA RESP QL NAA+PROBE: DETECTED

## 2021-08-06 PROCEDURE — U0002 COVID-19 LAB TEST NON-CDC: HCPCS | Performed by: NURSE PRACTITIONER

## 2021-08-06 PROCEDURE — 87880 STREP A ASSAY W/OPTIC: CPT | Performed by: NURSE PRACTITIONER

## 2021-08-06 PROCEDURE — 99214 OFFICE O/P EST MOD 30 MIN: CPT | Performed by: NURSE PRACTITIONER

## 2021-08-06 RX ORDER — AMOXICILLIN 500 MG/1
500 TABLET, FILM COATED ORAL 2 TIMES DAILY
Qty: 20 TABLET | Refills: 0 | Status: SHIPPED | OUTPATIENT
Start: 2021-08-06 | End: 2021-08-16

## 2021-08-06 NOTE — ED PROVIDER NOTES
Patient Seen in: [facility]      History   Patient presents with:  Covid-19 Test    Stated Complaint: covid test/loss of taste/smell/sorethroat/congested/ha. traveled to [facility]    HPI/Subjective:   HPI    This is a 44-year-old female presentin Appearance: Normal appearance. HENT:      Head: Normocephalic.       Right Ear: Tympanic membrane normal.      Left Ear: Tympanic membrane normal.      Nose: Nose normal.      Mouth/Throat:      Mouth: Mucous membranes are moist.      Pharynx: Oropharynx states, she would like to read through the information at home is currently declining the treatment.   Patient made aware outpatient order placed and she will be contacted by a representative if she would like to have infusion done an appointment will be sc 10 days. , Normal, Disp-20 tablet, R-0

## 2021-08-06 NOTE — ED INITIAL ASSESSMENT (HPI)
Pt presents to the IC with c/o a cough, sore throat, loss of taste and smell since Tuesday. No fevers. +sick contacts. No known covid exposure. Pt is fully covid vaccinated.

## 2021-10-11 ENCOUNTER — TELEPHONE (OUTPATIENT)
Dept: ENDOCRINOLOGY CLINIC | Facility: CLINIC | Age: 26
End: 2021-10-11

## 2021-10-11 DIAGNOSIS — E04.1 THYROID NODULE: Primary | ICD-10-CM

## 2021-10-11 NOTE — TELEPHONE ENCOUNTER
Pt called in to schedule an appt as soon as possible. Pt declined the soonest appt and is requesting I send a message for appt.  Please follow up

## 2021-10-12 NOTE — TELEPHONE ENCOUNTER
lov : 12/2/20  Per message repeat US in 10 mo and fu visit  Us thyroid order pended.         Please advise

## 2021-10-18 NOTE — TELEPHONE ENCOUNTER
Called and booked for 11/16. Patient states that she is going to have insurance starting Nov 9th and would like to know incase there are any issues if her treatment plans can be postponed until Jan 2022.      Patient agreeable to current plan of labs an

## 2021-10-18 NOTE — TELEPHONE ENCOUNTER
Does she want sto postpone at this time?    If not same plan as of now  If any concerns come up please call us  Thanks

## 2021-10-27 NOTE — TELEPHONE ENCOUNTER
LM advising patient that treatment plan will not change: labs and thyroid US prior to f/u apt   Advised patient to call with update/questions

## 2021-11-13 ENCOUNTER — LAB ENCOUNTER (OUTPATIENT)
Dept: LAB | Age: 26
End: 2021-11-13
Attending: INTERNAL MEDICINE
Payer: COMMERCIAL

## 2021-11-13 ENCOUNTER — HOSPITAL ENCOUNTER (OUTPATIENT)
Dept: ULTRASOUND IMAGING | Age: 26
Discharge: HOME | End: 2021-11-13
Attending: INTERNAL MEDICINE
Payer: COMMERCIAL

## 2021-11-13 DIAGNOSIS — R80.9 PROTEINURIA, UNSPECIFIED TYPE: ICD-10-CM

## 2021-11-13 DIAGNOSIS — R70.0 ELEVATED SED RATE: ICD-10-CM

## 2021-11-13 DIAGNOSIS — E04.1 THYROID NODULE: ICD-10-CM

## 2021-11-13 PROCEDURE — 76536 US EXAM OF HEAD AND NECK: CPT | Performed by: INTERNAL MEDICINE

## 2021-11-13 PROCEDURE — 36415 COLL VENOUS BLD VENIPUNCTURE: CPT

## 2021-11-13 PROCEDURE — 84443 ASSAY THYROID STIM HORMONE: CPT

## 2021-11-13 PROCEDURE — 85652 RBC SED RATE AUTOMATED: CPT

## 2021-11-13 PROCEDURE — 86140 C-REACTIVE PROTEIN: CPT

## 2021-11-13 PROCEDURE — 81003 URINALYSIS AUTO W/O SCOPE: CPT

## 2021-11-16 ENCOUNTER — TELEMEDICINE (OUTPATIENT)
Dept: ENDOCRINOLOGY CLINIC | Facility: CLINIC | Age: 26
End: 2021-11-16

## 2021-11-16 ENCOUNTER — PATIENT MESSAGE (OUTPATIENT)
Dept: ENDOCRINOLOGY CLINIC | Facility: CLINIC | Age: 26
End: 2021-11-16

## 2021-11-16 DIAGNOSIS — E04.1 THYROID NODULE: Primary | ICD-10-CM

## 2021-11-16 PROCEDURE — 99213 OFFICE O/P EST LOW 20 MIN: CPT | Performed by: INTERNAL MEDICINE

## 2021-11-16 NOTE — TELEPHONE ENCOUNTER
From: Ivonne Truong  To: Kiki Harris MD  Sent: 11/16/2021 8:44 AM CST  Subject: Appointment today     I have an appointment today with Dr. Linn Martinez. I believe it’s to discuss the results on my thyroid ultrasound. Is it necessary that go in person?

## 2021-11-16 NOTE — TELEPHONE ENCOUNTER
Dr. Joss Higgins -- please advise if okay to switch to video visit. LOV 12/02/20. Thyroid ultrasound done 11/13/21. Thank you.

## 2021-11-16 NOTE — PROGRESS NOTES
Sharmaine Roque verbally consents to a video visit on 11/16/2021     Patient understands and accepts financial responsibility for any deductible, co-insurance and/or co-pays associated with this service.   Patient has been referred to the [facility] website at w reviewed.     ASSESSMENTS:     REVIEW OF SYSTEMS:  Constitutional: Negative for: weight change, fever, fatigue, cold/heat intolerance  Eyes: Negative for:  Visual changes, proptosis, blurring  ENT: Negative for:  dysphagia, neck swelling, dysphonia  Respira follow with yearly thyroid ultrasound    I reviewed recent thyroid US in detail  Right thyroid nodule is stable.    No compressive symptoms  She will like to monitor on US    PLAN:   - FU with PCP for Thyroid US in 15-18 months  - She has positive thyroid A

## 2021-11-25 ENCOUNTER — HOSPITAL ENCOUNTER (OUTPATIENT)
Age: 26
Discharge: HOME | End: 2021-11-25
Payer: COMMERCIAL

## 2021-11-25 VITALS
DIASTOLIC BLOOD PRESSURE: 73 MMHG | HEART RATE: 84 BPM | BODY MASS INDEX: 46.01 KG/M2 | OXYGEN SATURATION: 100 % | SYSTOLIC BLOOD PRESSURE: 136 MMHG | RESPIRATION RATE: 18 BRPM | WEIGHT: 250 LBS | TEMPERATURE: 99 F | HEIGHT: 62 IN

## 2021-11-25 DIAGNOSIS — J02.0 STREPTOCOCCAL SORE THROAT: Primary | ICD-10-CM

## 2021-11-25 PROCEDURE — 99213 OFFICE O/P EST LOW 20 MIN: CPT | Performed by: NURSE PRACTITIONER

## 2021-11-25 PROCEDURE — U0002 COVID-19 LAB TEST NON-CDC: HCPCS | Performed by: NURSE PRACTITIONER

## 2021-11-25 PROCEDURE — 87880 STREP A ASSAY W/OPTIC: CPT | Performed by: NURSE PRACTITIONER

## 2021-11-25 RX ORDER — AMOXICILLIN 875 MG/1
875 TABLET, COATED ORAL 2 TIMES DAILY
Qty: 20 TABLET | Refills: 0 | Status: SHIPPED | OUTPATIENT
Start: 2021-11-25 | End: 2021-12-05

## 2021-11-25 RX ORDER — AMOXICILLIN 875 MG/1
875 TABLET, COATED ORAL 2 TIMES DAILY
Qty: 20 TABLET | Refills: 0 | Status: SHIPPED | OUTPATIENT
Start: 2021-11-25 | End: 2021-11-25

## 2021-11-25 NOTE — ED PROVIDER NOTES
Patient presents with:  Sore Throat      HPI:     Kourtney West is a 32year old female who presents for evaluation of a chief complaint of sore throat that started yesterday. The sore throat worsened today. No difficulty swallowing. Speech is clear. Concern: Not Asked        Stress Concern: Not Asked        Weight Concern: Not Asked        Special Diet: Not Asked        Back Care: Not Asked        Exercise: Not Asked        Bike Helmet: Not Asked        Seat Belt: Not Asked        Self-Exams: Not Aske daily for 10 days. Dispense:  20 tablet          Refill:  0      Labs performed this visit:  Recent Results (from the past 10 hour(s))   Rapid SARS-CoV-2 by PCR    Collection Time: 11/25/21  1:40 PM    Specimen: Nares;  Other   Result Value Ref Ran

## 2021-11-29 ENCOUNTER — OFFICE VISIT (OUTPATIENT)
Dept: FAMILY MEDICINE CLINIC | Facility: CLINIC | Age: 26
End: 2021-11-29
Payer: COMMERCIAL

## 2021-11-29 VITALS
HEART RATE: 96 BPM | BODY MASS INDEX: 46.56 KG/M2 | HEIGHT: 62 IN | WEIGHT: 253 LBS | DIASTOLIC BLOOD PRESSURE: 84 MMHG | SYSTOLIC BLOOD PRESSURE: 128 MMHG

## 2021-11-29 DIAGNOSIS — E78.1 HYPERTRIGLYCERIDEMIA: ICD-10-CM

## 2021-11-29 DIAGNOSIS — L83 ACANTHOSIS NIGRICANS: ICD-10-CM

## 2021-11-29 DIAGNOSIS — E55.9 VITAMIN D DEFICIENCY: ICD-10-CM

## 2021-11-29 DIAGNOSIS — Z00.00 WELL ADULT EXAM: Primary | ICD-10-CM

## 2021-11-29 DIAGNOSIS — E66.01 MORBID OBESITY WITH BMI OF 45.0-49.9, ADULT (HCC): ICD-10-CM

## 2021-11-29 DIAGNOSIS — E88.81 INSULIN RESISTANCE: ICD-10-CM

## 2021-11-29 DIAGNOSIS — R73.03 PREDIABETES: ICD-10-CM

## 2021-11-29 PROCEDURE — 3074F SYST BP LT 130 MM HG: CPT | Performed by: FAMILY MEDICINE

## 2021-11-29 PROCEDURE — 99395 PREV VISIT EST AGE 18-39: CPT | Performed by: FAMILY MEDICINE

## 2021-11-29 PROCEDURE — 3079F DIAST BP 80-89 MM HG: CPT | Performed by: FAMILY MEDICINE

## 2021-11-29 PROCEDURE — 3008F BODY MASS INDEX DOCD: CPT | Performed by: FAMILY MEDICINE

## 2021-11-29 NOTE — PROGRESS NOTES
HPI:    Patient ID: Rosita Tinoco is a 32year old female.     HPI  Patient presents with:  Routine Physical: sees gyne     Seen UC 11/25 for srep throat  On amoxicillin     Wt Readings from Last 6 Encounters:  11/29/21 : 253 lb (114.8 kg)  11/25/21 : 250 kg)   LMP 10/25/2021   BMI 46.27 kg/m²     Past Medical History:   Diagnosis Date   • Asthma    • Lipid screening 8/21/10    per NG   • Morbid obesity with BMI of 40.0-44.9, adult ([facility] [address]    • PCOS (polycystic ovarian syndrome)    • Vitamin D deficiency file  Social Connections: Not on file  Intimate Partner Violence: Not on file  Housing Stability: Not on file  Family History   Problem Relation Age of Onset   • Other (Other) Mother    • Thyroid disease Father    • Diabetes Paternal Grandfather    • Diabe 05/30/2022  COVID-19 Vaccine Completed  Pneumococcal Vaccine: Birth to 56yrs Aged Out       Current Outpatient Medications   Medication Sig Dispense Refill   • amoxicillin 875 MG Oral Tab Take 1 tablet (875 mg total) by mouth 2 (two) times daily for 10 day alert and oriented to person, place, and time. Cranial Nerves: No cranial nerve deficit. Motor: No abnormal muscle tone. Coordination: Coordination normal.      Deep Tendon Reflexes: Reflexes are normal and symmetric.    Psychiatric: No prescriptions requested or ordered in this encounter       Imaging & Referrals:  None       [id number]

## 2021-12-01 ENCOUNTER — PATIENT MESSAGE (OUTPATIENT)
Dept: FAMILY MEDICINE CLINIC | Facility: CLINIC | Age: 26
End: 2021-12-01

## 2021-12-01 ENCOUNTER — LAB ENCOUNTER (OUTPATIENT)
Dept: LAB | Facility: HOSPITAL | Age: 26
End: 2021-12-01
Attending: FAMILY MEDICINE
Payer: COMMERCIAL

## 2021-12-01 DIAGNOSIS — E55.9 VITAMIN D DEFICIENCY: ICD-10-CM

## 2021-12-01 DIAGNOSIS — E88.81 INSULIN RESISTANCE: ICD-10-CM

## 2021-12-01 DIAGNOSIS — Z00.00 WELL ADULT EXAM: ICD-10-CM

## 2021-12-01 DIAGNOSIS — R73.03 PREDIABETES: ICD-10-CM

## 2021-12-01 PROCEDURE — 83036 HEMOGLOBIN GLYCOSYLATED A1C: CPT

## 2021-12-01 PROCEDURE — 82306 VITAMIN D 25 HYDROXY: CPT

## 2021-12-01 PROCEDURE — 36415 COLL VENOUS BLD VENIPUNCTURE: CPT

## 2021-12-01 PROCEDURE — 83525 ASSAY OF INSULIN: CPT

## 2021-12-01 PROCEDURE — 80061 LIPID PANEL: CPT

## 2021-12-01 PROCEDURE — 85025 COMPLETE CBC W/AUTO DIFF WBC: CPT

## 2021-12-01 PROCEDURE — 80053 COMPREHEN METABOLIC PANEL: CPT

## 2021-12-06 ENCOUNTER — PATIENT MESSAGE (OUTPATIENT)
Dept: FAMILY MEDICINE CLINIC | Facility: CLINIC | Age: 26
End: 2021-12-06

## 2021-12-06 ENCOUNTER — TELEPHONE (OUTPATIENT)
Dept: FAMILY MEDICINE CLINIC | Facility: CLINIC | Age: 26
End: 2021-12-06

## 2021-12-06 DIAGNOSIS — E55.9 VITAMIN D DEFICIENCY: Primary | ICD-10-CM

## 2021-12-06 DIAGNOSIS — E88.81 INSULIN RESISTANCE: ICD-10-CM

## 2021-12-06 RX ORDER — ERGOCALCIFEROL 1.25 MG/1
50000 CAPSULE ORAL WEEKLY
Qty: 12 CAPSULE | Refills: 3 | Status: SHIPPED | OUTPATIENT
Start: 2021-12-06 | End: 2022-01-05

## 2021-12-06 NOTE — TELEPHONE ENCOUNTER
Spoke with pt,  verified. Pt is looking for lab result done on 21. pls advise, thanks in advance. No future appointments. Ambulatory

## 2021-12-07 NOTE — TELEPHONE ENCOUNTER
If patient would like me to start Metformin and discuss this further please have her schedule a video visit or follow-up in office. I will be happy to discuss.

## 2021-12-07 NOTE — TELEPHONE ENCOUNTER
From: Allyson Ventura  To: John Onofre MD  Sent: 12/6/2021 2:50 PM CST  Subject: Question regarding LIPID PANEL    Even though it has improved I am still pre diabetic correct? What does the mean by insulin resistance? Is this bc I am overweight.  How woul

## 2022-07-05 ENCOUNTER — TELEPHONE (OUTPATIENT)
Dept: FAMILY MEDICINE CLINIC | Facility: CLINIC | Age: 27
End: 2022-07-05

## 2022-07-05 DIAGNOSIS — R73.03 PREDIABETES: ICD-10-CM

## 2022-07-05 DIAGNOSIS — E55.9 VITAMIN D DEFICIENCY: ICD-10-CM

## 2022-07-05 DIAGNOSIS — E88.819 INSULIN RESISTANCE: ICD-10-CM

## 2022-07-05 DIAGNOSIS — E78.1 HYPERTRIGLYCERIDEMIA: Primary | ICD-10-CM

## 2022-07-05 NOTE — TELEPHONE ENCOUNTER
MIGUEL: Dr Vannessa Estrella appointment of patient have been change to follow up, patient is just asking for an Order for her sugar due to the last time she had blood work patient, you told her that she's boarder line diabetic that's why patient would like to do another blood work.

## 2022-07-07 ENCOUNTER — LAB ENCOUNTER (OUTPATIENT)
Dept: LAB | Age: 27
End: 2022-07-07
Attending: FAMILY MEDICINE
Payer: COMMERCIAL

## 2022-07-07 DIAGNOSIS — E78.1 HYPERTRIGLYCERIDEMIA: ICD-10-CM

## 2022-07-07 DIAGNOSIS — E88.81 INSULIN RESISTANCE: ICD-10-CM

## 2022-07-07 DIAGNOSIS — R73.03 PREDIABETES: ICD-10-CM

## 2022-07-07 DIAGNOSIS — E55.9 VITAMIN D DEFICIENCY: ICD-10-CM

## 2022-07-07 LAB
ALBUMIN SERPL-MCNC: 4.1 G/DL (ref 3.4–5)
ALBUMIN/GLOB SERPL: 0.9 {RATIO} (ref 1–2)
ALP LIVER SERPL-CCNC: 78 U/L
ALT SERPL-CCNC: 40 U/L
ANION GAP SERPL CALC-SCNC: 9 MMOL/L (ref 0–18)
AST SERPL-CCNC: 19 U/L (ref 15–37)
BILIRUB SERPL-MCNC: 0.5 MG/DL (ref 0.1–2)
BUN BLD-MCNC: 12 MG/DL (ref 7–18)
BUN/CREAT SERPL: 15 (ref 10–20)
CALCIUM BLD-MCNC: 9.1 MG/DL (ref 8.5–10.1)
CHLORIDE SERPL-SCNC: 106 MMOL/L (ref 98–112)
CO2 SERPL-SCNC: 22 MMOL/L (ref 21–32)
CREAT BLD-MCNC: 0.8 MG/DL
EST. AVERAGE GLUCOSE BLD GHB EST-MCNC: 108 MG/DL (ref 68–126)
FASTING STATUS PATIENT QL REPORTED: YES
GLOBULIN PLAS-MCNC: 4.6 G/DL (ref 2.8–4.4)
GLUCOSE BLD-MCNC: 89 MG/DL (ref 70–99)
HBA1C MFR BLD: 5.4 % (ref ?–5.7)
INSULIN SERPL-ACNC: 27.9 MU/L (ref 3–25)
OSMOLALITY SERPL CALC.SUM OF ELEC: 283 MOSM/KG (ref 275–295)
POTASSIUM SERPL-SCNC: 3.8 MMOL/L (ref 3.5–5.1)
PROT SERPL-MCNC: 8.7 G/DL (ref 6.4–8.2)
SODIUM SERPL-SCNC: 137 MMOL/L (ref 136–145)
VIT D+METAB SERPL-MCNC: 15 NG/ML (ref 30–100)

## 2022-07-07 PROCEDURE — 82306 VITAMIN D 25 HYDROXY: CPT

## 2022-07-07 PROCEDURE — 36415 COLL VENOUS BLD VENIPUNCTURE: CPT

## 2022-07-07 PROCEDURE — 83525 ASSAY OF INSULIN: CPT

## 2022-07-07 PROCEDURE — 80053 COMPREHEN METABOLIC PANEL: CPT

## 2022-07-07 PROCEDURE — 83036 HEMOGLOBIN GLYCOSYLATED A1C: CPT

## 2022-07-13 ENCOUNTER — OFFICE VISIT (OUTPATIENT)
Dept: FAMILY MEDICINE CLINIC | Facility: CLINIC | Age: 27
End: 2022-07-13
Payer: COMMERCIAL

## 2022-07-13 VITALS
DIASTOLIC BLOOD PRESSURE: 69 MMHG | SYSTOLIC BLOOD PRESSURE: 102 MMHG | BODY MASS INDEX: 44.53 KG/M2 | HEIGHT: 62 IN | WEIGHT: 242 LBS | HEART RATE: 73 BPM

## 2022-07-13 DIAGNOSIS — E66.01 MORBID OBESITY WITH BMI OF 45.0-49.9, ADULT (HCC): ICD-10-CM

## 2022-07-13 DIAGNOSIS — R73.03 PREDIABETES: Primary | ICD-10-CM

## 2022-07-13 DIAGNOSIS — E88.81 INSULIN RESISTANCE: ICD-10-CM

## 2022-07-13 DIAGNOSIS — E78.1 HYPERTRIGLYCERIDEMIA: ICD-10-CM

## 2022-07-13 DIAGNOSIS — E55.9 VITAMIN D DEFICIENCY: ICD-10-CM

## 2022-07-13 PROCEDURE — 3078F DIAST BP <80 MM HG: CPT | Performed by: FAMILY MEDICINE

## 2022-07-13 PROCEDURE — 3074F SYST BP LT 130 MM HG: CPT | Performed by: FAMILY MEDICINE

## 2022-07-13 PROCEDURE — 3008F BODY MASS INDEX DOCD: CPT | Performed by: FAMILY MEDICINE

## 2022-07-13 PROCEDURE — 99214 OFFICE O/P EST MOD 30 MIN: CPT | Performed by: FAMILY MEDICINE

## 2022-07-13 RX ORDER — ERGOCALCIFEROL 1.25 MG/1
50000 CAPSULE ORAL WEEKLY
Qty: 12 CAPSULE | Refills: 3 | Status: SHIPPED | OUTPATIENT
Start: 2022-07-13 | End: 2022-08-12

## 2022-09-15 NOTE — PROGRESS NOTES
Dr Twin Sun at  assessing pt bleeding and BPs. Per Dr Twin Sun she would like pt to remain in recovery for 30 additional minutes for monitoring of bleeding. Pt U/U small rubra bleeding, stable.  Will ctm PT INFORMED     HE STILL HAS SOME PAIN. WANTS TO KNOW WHAT THE NEXT STEP WOULD BE SINCE THIS IS NORMAL

## 2022-09-22 NOTE — TELEPHONE ENCOUNTER
----- Message from Helen Jiménez MD sent at 6/26/2017 11:10 PM CDT -----  Insulin resistance has worsened.   Thyroid ok  Pregnancy test negative  Please follow up with endocrinology  pls call patient Alternatives Discussed Intro (Do Not Add Period): I discussed alternative treatments to Mohs surgery and specifically discussed the risks and benefits of

## 2023-01-03 ENCOUNTER — TELEPHONE (OUTPATIENT)
Dept: ENDOCRINOLOGY CLINIC | Facility: CLINIC | Age: 28
End: 2023-01-03

## 2023-01-03 NOTE — TELEPHONE ENCOUNTER
Pt called to find out when she needs to be seen in office and also when she is due for ultrasound of thyroid. Please call.

## 2023-01-05 ENCOUNTER — TELEPHONE (OUTPATIENT)
Dept: FAMILY MEDICINE CLINIC | Facility: CLINIC | Age: 28
End: 2023-01-05

## 2023-01-05 DIAGNOSIS — E04.1 THYROID NODULE: Primary | ICD-10-CM

## 2023-01-05 NOTE — TELEPHONE ENCOUNTER
Left message to call back. Patient is to follow up with PCP now. She already called PCP about getting thyroid US.

## 2023-01-05 NOTE — TELEPHONE ENCOUNTER
Spoke to patient - plan per LOV dtd 11/16/21:  FU with PCP for Thyroid US in 15-18 months  - She has positive thyroid AB, hence recommend TSH monitoring on an annual basis.    Reviewed symptoms of hypo and hyperthyroidism to call if she develops any  - Also reviewed importance of TSH monitoring and possible consultation pre conception    Patient stated understanding and will f/u with Dr. Sabrina Kellogg for thyroid US

## 2023-01-06 NOTE — TELEPHONE ENCOUNTER
1st attempt/left voice mail message for pt to call back. Please, see the message below when the call is returned.

## 2023-02-06 ENCOUNTER — NURSE TRIAGE (OUTPATIENT)
Dept: FAMILY MEDICINE CLINIC | Facility: CLINIC | Age: 28
End: 2023-02-06

## 2023-02-06 NOTE — TELEPHONE ENCOUNTER
Its a diff patient, I think that physical rescheduled already  I dont have any other slots open today sorry

## 2023-02-06 NOTE — TELEPHONE ENCOUNTER
Pt was called back and informed of Dr. James Dickens message below and she verbalized understanding. She will try to make it in tomorrow.

## 2023-02-07 ENCOUNTER — OFFICE VISIT (OUTPATIENT)
Dept: FAMILY MEDICINE CLINIC | Facility: CLINIC | Age: 28
End: 2023-02-07

## 2023-02-07 ENCOUNTER — LAB ENCOUNTER (OUTPATIENT)
Dept: LAB | Age: 28
End: 2023-02-07
Attending: INTERNAL MEDICINE
Payer: COMMERCIAL

## 2023-02-07 VITALS
OXYGEN SATURATION: 98 % | BODY MASS INDEX: 44.72 KG/M2 | DIASTOLIC BLOOD PRESSURE: 78 MMHG | SYSTOLIC BLOOD PRESSURE: 118 MMHG | WEIGHT: 243 LBS | HEART RATE: 71 BPM | HEIGHT: 62 IN

## 2023-02-07 DIAGNOSIS — R73.03 PREDIABETES: ICD-10-CM

## 2023-02-07 DIAGNOSIS — E55.9 VITAMIN D DEFICIENCY: Primary | ICD-10-CM

## 2023-02-07 DIAGNOSIS — R53.83 FATIGUE, UNSPECIFIED TYPE: ICD-10-CM

## 2023-02-07 DIAGNOSIS — E66.01 MORBID OBESITY WITH BMI OF 45.0-49.9, ADULT (HCC): ICD-10-CM

## 2023-02-07 DIAGNOSIS — E88.81 INSULIN RESISTANCE: Primary | ICD-10-CM

## 2023-02-07 DIAGNOSIS — N92.6 IRREGULAR MENSES: ICD-10-CM

## 2023-02-07 DIAGNOSIS — E88.81 INSULIN RESISTANCE: ICD-10-CM

## 2023-02-07 DIAGNOSIS — R77.1 ELEVATED SERUM GLOBULIN LEVEL: ICD-10-CM

## 2023-02-07 DIAGNOSIS — E55.9 VITAMIN D DEFICIENCY: ICD-10-CM

## 2023-02-07 DIAGNOSIS — R77.8 ELEVATED TOTAL PROTEIN: ICD-10-CM

## 2023-02-07 LAB
ALBUMIN SERPL-MCNC: 4.2 G/DL (ref 3.4–5)
ALBUMIN/GLOB SERPL: 0.9 {RATIO} (ref 1–2)
ALP LIVER SERPL-CCNC: 71 U/L
ALT SERPL-CCNC: 40 U/L
ANION GAP SERPL CALC-SCNC: 5 MMOL/L (ref 0–18)
AST SERPL-CCNC: 17 U/L (ref 15–37)
BASOPHILS # BLD AUTO: 0.08 X10(3) UL (ref 0–0.2)
BASOPHILS NFR BLD AUTO: 1 %
BILIRUB SERPL-MCNC: 0.5 MG/DL (ref 0.1–2)
BUN BLD-MCNC: 11 MG/DL (ref 7–18)
BUN/CREAT SERPL: 13.8 (ref 10–20)
CALCIUM BLD-MCNC: 9.5 MG/DL (ref 8.5–10.1)
CHLORIDE SERPL-SCNC: 105 MMOL/L (ref 98–112)
CO2 SERPL-SCNC: 27 MMOL/L (ref 21–32)
CREAT BLD-MCNC: 0.8 MG/DL
DEPRECATED RDW RBC AUTO: 44.6 FL (ref 35.1–46.3)
DHEA-S SERPL-MCNC: 377.8 UG/DL
EOSINOPHIL # BLD AUTO: 0.21 X10(3) UL (ref 0–0.7)
EOSINOPHIL NFR BLD AUTO: 2.7 %
ERYTHROCYTE [DISTWIDTH] IN BLOOD BY AUTOMATED COUNT: 13 % (ref 11–15)
EST. AVERAGE GLUCOSE BLD GHB EST-MCNC: 111 MG/DL (ref 68–126)
ESTRADIOL SERPL-MCNC: 105.6 PG/ML
FASTING STATUS PATIENT QL REPORTED: YES
FSH SERPL-ACNC: 1.8 MIU/ML
GFR SERPLBLD BASED ON 1.73 SQ M-ARVRAT: 104 ML/MIN/1.73M2 (ref 60–?)
GLOBULIN PLAS-MCNC: 4.5 G/DL (ref 2.8–4.4)
GLUCOSE BLD-MCNC: 100 MG/DL (ref 70–99)
HBA1C MFR BLD: 5.5 % (ref ?–5.7)
HCG SERPL QL: NEGATIVE
HCT VFR BLD AUTO: 41.8 %
HGB BLD-MCNC: 13.8 G/DL
IMM GRANULOCYTES # BLD AUTO: 0.01 X10(3) UL (ref 0–1)
IMM GRANULOCYTES NFR BLD: 0.1 %
INSULIN SERPL-ACNC: 19.7 MU/L (ref 3–25)
LH SERPL-ACNC: 6.5 MIU/ML
LYMPHOCYTES # BLD AUTO: 2 X10(3) UL (ref 1–4)
LYMPHOCYTES NFR BLD AUTO: 25.8 %
MCH RBC QN AUTO: 30.9 PG (ref 26–34)
MCHC RBC AUTO-ENTMCNC: 33 G/DL (ref 31–37)
MCV RBC AUTO: 93.7 FL
MONOCYTES # BLD AUTO: 0.45 X10(3) UL (ref 0.1–1)
MONOCYTES NFR BLD AUTO: 5.8 %
NEUTROPHILS # BLD AUTO: 5 X10 (3) UL (ref 1.5–7.7)
NEUTROPHILS # BLD AUTO: 5 X10(3) UL (ref 1.5–7.7)
NEUTROPHILS NFR BLD AUTO: 64.6 %
OSMOLALITY SERPL CALC.SUM OF ELEC: 283 MOSM/KG (ref 275–295)
PLATELET # BLD AUTO: 231 10(3)UL (ref 150–450)
POTASSIUM SERPL-SCNC: 4.3 MMOL/L (ref 3.5–5.1)
PROLACTIN SERPL-MCNC: 22.8 NG/ML
PROT SERPL-MCNC: 8.7 G/DL (ref 6.4–8.2)
RBC # BLD AUTO: 4.46 X10(6)UL
SODIUM SERPL-SCNC: 137 MMOL/L (ref 136–145)
TSI SER-ACNC: 3.2 MIU/ML (ref 0.36–3.74)
VIT B12 SERPL-MCNC: 569 PG/ML (ref 193–986)
VIT D+METAB SERPL-MCNC: 15.7 NG/ML (ref 30–100)
WBC # BLD AUTO: 7.8 X10(3) UL (ref 4–11)

## 2023-02-07 PROCEDURE — 84402 ASSAY OF FREE TESTOSTERONE: CPT

## 2023-02-07 PROCEDURE — 85025 COMPLETE CBC W/AUTO DIFF WBC: CPT

## 2023-02-07 PROCEDURE — 83525 ASSAY OF INSULIN: CPT

## 2023-02-07 PROCEDURE — 83002 ASSAY OF GONADOTROPIN (LH): CPT

## 2023-02-07 PROCEDURE — 82670 ASSAY OF TOTAL ESTRADIOL: CPT

## 2023-02-07 PROCEDURE — 84146 ASSAY OF PROLACTIN: CPT

## 2023-02-07 PROCEDURE — 82627 DEHYDROEPIANDROSTERONE: CPT

## 2023-02-07 PROCEDURE — 83036 HEMOGLOBIN GLYCOSYLATED A1C: CPT

## 2023-02-07 PROCEDURE — 99214 OFFICE O/P EST MOD 30 MIN: CPT | Performed by: FAMILY MEDICINE

## 2023-02-07 PROCEDURE — 84443 ASSAY THYROID STIM HORMONE: CPT

## 2023-02-07 PROCEDURE — 83498 ASY HYDROXYPROGESTERONE 17-D: CPT

## 2023-02-07 PROCEDURE — 80053 COMPREHEN METABOLIC PANEL: CPT

## 2023-02-07 PROCEDURE — 82306 VITAMIN D 25 HYDROXY: CPT | Performed by: FAMILY MEDICINE

## 2023-02-07 PROCEDURE — 82607 VITAMIN B-12: CPT

## 2023-02-07 PROCEDURE — 83001 ASSAY OF GONADOTROPIN (FSH): CPT

## 2023-02-07 PROCEDURE — 84703 CHORIONIC GONADOTROPIN ASSAY: CPT

## 2023-02-07 PROCEDURE — 84403 ASSAY OF TOTAL TESTOSTERONE: CPT

## 2023-02-07 PROCEDURE — 3008F BODY MASS INDEX DOCD: CPT | Performed by: FAMILY MEDICINE

## 2023-02-07 PROCEDURE — 3078F DIAST BP <80 MM HG: CPT | Performed by: FAMILY MEDICINE

## 2023-02-07 PROCEDURE — 3074F SYST BP LT 130 MM HG: CPT | Performed by: FAMILY MEDICINE

## 2023-02-07 PROCEDURE — 36415 COLL VENOUS BLD VENIPUNCTURE: CPT

## 2023-02-07 RX ORDER — ERGOCALCIFEROL 1.25 MG/1
50000 CAPSULE ORAL WEEKLY
Qty: 12 CAPSULE | Refills: 3 | Status: SHIPPED | OUTPATIENT
Start: 2023-02-07 | End: 2023-03-09

## 2023-02-09 LAB — 17-HYDROPROGESTERONE QNT: 137.49 NG/DL

## 2023-02-12 LAB
TESTOSTERONE, FREE, S: 1.03 NG/DL
TESTOSTERONE, TOTAL, S: 27 NG/DL

## 2023-02-15 ENCOUNTER — OFFICE VISIT (OUTPATIENT)
Dept: ENDOCRINOLOGY CLINIC | Facility: CLINIC | Age: 28
End: 2023-02-15

## 2023-02-15 VITALS
SYSTOLIC BLOOD PRESSURE: 121 MMHG | BODY MASS INDEX: 44.57 KG/M2 | DIASTOLIC BLOOD PRESSURE: 81 MMHG | HEIGHT: 62 IN | HEART RATE: 73 BPM | WEIGHT: 242.19 LBS

## 2023-02-15 DIAGNOSIS — N97.0 ANOVULATION: ICD-10-CM

## 2023-02-15 DIAGNOSIS — E04.1 THYROID NODULE: Primary | ICD-10-CM

## 2023-02-15 PROCEDURE — 3008F BODY MASS INDEX DOCD: CPT | Performed by: INTERNAL MEDICINE

## 2023-02-15 PROCEDURE — 99213 OFFICE O/P EST LOW 20 MIN: CPT | Performed by: INTERNAL MEDICINE

## 2023-02-15 PROCEDURE — 3074F SYST BP LT 130 MM HG: CPT | Performed by: INTERNAL MEDICINE

## 2023-02-15 PROCEDURE — 3079F DIAST BP 80-89 MM HG: CPT | Performed by: INTERNAL MEDICINE

## 2023-02-20 ENCOUNTER — HOSPITAL ENCOUNTER (OUTPATIENT)
Dept: ULTRASOUND IMAGING | Facility: HOSPITAL | Age: 28
Discharge: HOME | End: 2023-02-20
Attending: FAMILY MEDICINE
Payer: COMMERCIAL

## 2023-02-20 DIAGNOSIS — E04.1 THYROID NODULE: ICD-10-CM

## 2023-02-20 PROCEDURE — 76536 US EXAM OF HEAD AND NECK: CPT | Performed by: FAMILY MEDICINE

## 2023-08-26 NOTE — TELEPHONE ENCOUNTER
Called, LMTCB #2, [facility] sent, Patient does not have appointment for her [address] until 11/13/21. Statement Selected

## 2023-09-29 ENCOUNTER — PATIENT OUTREACH (OUTPATIENT)
Dept: FAMILY MEDICINE CLINIC | Facility: CLINIC | Age: 28
End: 2023-09-29

## 2023-11-08 ENCOUNTER — OFFICE VISIT (OUTPATIENT)
Dept: FAMILY MEDICINE CLINIC | Facility: CLINIC | Age: 28
End: 2023-11-08

## 2023-11-08 VITALS
HEART RATE: 76 BPM | HEIGHT: 62 IN | WEIGHT: 224 LBS | DIASTOLIC BLOOD PRESSURE: 72 MMHG | BODY MASS INDEX: 41.22 KG/M2 | SYSTOLIC BLOOD PRESSURE: 105 MMHG

## 2023-11-08 DIAGNOSIS — Z01.419 ENCOUNTER FOR GYNECOLOGICAL EXAMINATION: ICD-10-CM

## 2023-11-08 DIAGNOSIS — N92.6 IRREGULAR MENSES: ICD-10-CM

## 2023-11-08 DIAGNOSIS — R73.03 PREDIABETES: ICD-10-CM

## 2023-11-08 DIAGNOSIS — E66.01 MORBID OBESITY WITH BMI OF 40.0-44.9, ADULT (HCC): ICD-10-CM

## 2023-11-08 DIAGNOSIS — L83 ACANTHOSIS NIGRICANS: ICD-10-CM

## 2023-11-08 DIAGNOSIS — E55.9 VITAMIN D DEFICIENCY: ICD-10-CM

## 2023-11-08 DIAGNOSIS — Z00.00 WELL ADULT EXAM: Primary | ICD-10-CM

## 2023-11-08 DIAGNOSIS — E04.1 THYROID NODULE: ICD-10-CM

## 2023-11-08 PROCEDURE — 3008F BODY MASS INDEX DOCD: CPT | Performed by: FAMILY MEDICINE

## 2023-11-08 PROCEDURE — 3074F SYST BP LT 130 MM HG: CPT | Performed by: FAMILY MEDICINE

## 2023-11-08 PROCEDURE — 3078F DIAST BP <80 MM HG: CPT | Performed by: FAMILY MEDICINE

## 2023-11-08 PROCEDURE — 99395 PREV VISIT EST AGE 18-39: CPT | Performed by: FAMILY MEDICINE

## 2023-11-08 RX ORDER — ERGOCALCIFEROL 1.25 MG/1
50000 CAPSULE ORAL WEEKLY
COMMUNITY
Start: 2023-06-25 | End: 2023-11-08

## 2023-11-11 ENCOUNTER — LAB ENCOUNTER (OUTPATIENT)
Dept: LAB | Age: 28
End: 2023-11-11
Attending: FAMILY MEDICINE
Payer: COMMERCIAL

## 2023-11-11 DIAGNOSIS — Z00.00 WELL ADULT EXAM: ICD-10-CM

## 2023-11-11 DIAGNOSIS — E55.9 VITAMIN D DEFICIENCY: ICD-10-CM

## 2023-11-11 LAB
ALBUMIN SERPL-MCNC: 4.7 G/DL (ref 3.2–4.8)
ALBUMIN/GLOB SERPL: 1.3 {RATIO} (ref 1–2)
ALP LIVER SERPL-CCNC: 65 U/L
ALT SERPL-CCNC: 13 U/L
ANION GAP SERPL CALC-SCNC: 8 MMOL/L (ref 0–18)
AST SERPL-CCNC: 15 U/L (ref ?–34)
BASOPHILS # BLD AUTO: 0.11 X10(3) UL (ref 0–0.2)
BASOPHILS NFR BLD AUTO: 1.5 %
BILIRUB SERPL-MCNC: 0.6 MG/DL (ref 0.3–1.2)
BUN BLD-MCNC: 10 MG/DL (ref 9–23)
BUN/CREAT SERPL: 12.3 (ref 10–20)
CALCIUM BLD-MCNC: 9.8 MG/DL (ref 8.7–10.4)
CHLORIDE SERPL-SCNC: 100 MMOL/L (ref 98–112)
CHOLEST SERPL-MCNC: 138 MG/DL (ref ?–200)
CO2 SERPL-SCNC: 27 MMOL/L (ref 21–32)
CREAT BLD-MCNC: 0.81 MG/DL
DEPRECATED RDW RBC AUTO: 43.3 FL (ref 35.1–46.3)
EGFRCR SERPLBLD CKD-EPI 2021: 101 ML/MIN/1.73M2 (ref 60–?)
EOSINOPHIL # BLD AUTO: 0.4 X10(3) UL (ref 0–0.7)
EOSINOPHIL NFR BLD AUTO: 5.5 %
ERYTHROCYTE [DISTWIDTH] IN BLOOD BY AUTOMATED COUNT: 12.5 % (ref 11–15)
FASTING PATIENT LIPID ANSWER: YES
FASTING STATUS PATIENT QL REPORTED: YES
GLOBULIN PLAS-MCNC: 3.6 G/DL (ref 2.8–4.4)
GLUCOSE BLD-MCNC: 101 MG/DL (ref 70–99)
HCT VFR BLD AUTO: 41.1 %
HDLC SERPL-MCNC: 41 MG/DL (ref 40–59)
HGB BLD-MCNC: 13.8 G/DL
IMM GRANULOCYTES # BLD AUTO: 0.02 X10(3) UL (ref 0–1)
IMM GRANULOCYTES NFR BLD: 0.3 %
LDLC SERPL CALC-MCNC: 84 MG/DL (ref ?–100)
LYMPHOCYTES # BLD AUTO: 2.13 X10(3) UL (ref 1–4)
LYMPHOCYTES NFR BLD AUTO: 29.1 %
MCH RBC QN AUTO: 31.3 PG (ref 26–34)
MCHC RBC AUTO-ENTMCNC: 33.6 G/DL (ref 31–37)
MCV RBC AUTO: 93.2 FL
MONOCYTES # BLD AUTO: 0.53 X10(3) UL (ref 0.1–1)
MONOCYTES NFR BLD AUTO: 7.2 %
NEUTROPHILS # BLD AUTO: 4.14 X10 (3) UL (ref 1.5–7.7)
NEUTROPHILS # BLD AUTO: 4.14 X10(3) UL (ref 1.5–7.7)
NEUTROPHILS NFR BLD AUTO: 56.4 %
NONHDLC SERPL-MCNC: 97 MG/DL (ref ?–130)
OSMOLALITY SERPL CALC.SUM OF ELEC: 279 MOSM/KG (ref 275–295)
PLATELET # BLD AUTO: 219 10(3)UL (ref 150–450)
POTASSIUM SERPL-SCNC: 4.2 MMOL/L (ref 3.5–5.1)
PROT SERPL-MCNC: 8.3 G/DL (ref 5.7–8.2)
RBC # BLD AUTO: 4.41 X10(6)UL
SODIUM SERPL-SCNC: 135 MMOL/L (ref 136–145)
TRIGL SERPL-MCNC: 63 MG/DL (ref 30–149)
TSI SER-ACNC: 2.37 MIU/ML (ref 0.55–4.78)
VIT D+METAB SERPL-MCNC: 24.4 NG/ML (ref 30–100)
VLDLC SERPL CALC-MCNC: 10 MG/DL (ref 0–30)
WBC # BLD AUTO: 7.3 X10(3) UL (ref 4–11)

## 2023-11-11 PROCEDURE — 80053 COMPREHEN METABOLIC PANEL: CPT

## 2023-11-11 PROCEDURE — 80061 LIPID PANEL: CPT

## 2023-11-11 PROCEDURE — 36415 COLL VENOUS BLD VENIPUNCTURE: CPT

## 2023-11-11 PROCEDURE — 84443 ASSAY THYROID STIM HORMONE: CPT

## 2023-11-11 PROCEDURE — 85025 COMPLETE CBC W/AUTO DIFF WBC: CPT

## 2023-11-11 PROCEDURE — 82306 VITAMIN D 25 HYDROXY: CPT

## 2023-11-14 LAB
HPV I/H RISK 1 DNA SPEC QL NAA+PROBE: NEGATIVE
LAST PAP RESULT: NORMAL
PAP HISTORY (OTHER THAN LAST PAP): NORMAL

## 2024-01-02 ENCOUNTER — TELEPHONE (OUTPATIENT)
Dept: FAMILY MEDICINE CLINIC | Facility: CLINIC | Age: 29
End: 2024-01-02

## 2024-01-02 ENCOUNTER — TELEPHONE (OUTPATIENT)
Dept: OBGYN CLINIC | Facility: CLINIC | Age: 29
End: 2024-01-02

## 2024-01-02 DIAGNOSIS — N92.6 MISSED MENSES: Primary | ICD-10-CM

## 2024-01-02 NOTE — TELEPHONE ENCOUNTER
Patient requesting blood work to confirm pregnancy. States she has taken 2 pregnancy test at home and one was negative and one was positive.   Patient also states she did not have a period the month of December.   Patient has upcoming appt on 1/8/2024 with provider, but wondering if blood work can be ordered instead. Please advise.

## 2024-01-02 NOTE — TELEPHONE ENCOUNTER
Pt confirms +HPT and LMP around 11/20, within a week. She does not track periods but they are monthly. Pt is not yet taking a PNV but will start today. Pt aware she will see all 6 doctors and the first appt is with a nurse. OBN PC made. Pt will call with bleeding or cramping.

## 2024-01-03 ENCOUNTER — TELEPHONE (OUTPATIENT)
Dept: OBGYN CLINIC | Facility: CLINIC | Age: 29
End: 2024-01-03

## 2024-01-03 ENCOUNTER — TELEPHONE (OUTPATIENT)
Dept: FAMILY MEDICINE CLINIC | Facility: CLINIC | Age: 29
End: 2024-01-03

## 2024-01-03 NOTE — TELEPHONE ENCOUNTER
NP. Advised we cannot give recs until established, but will review at OBN.   Also assisted pt to schedule NPN.

## 2024-01-03 NOTE — TELEPHONE ENCOUNTER
Verified name and .    Patient states she is about 6 weeks pregnant- already has scheduled upcoming appointment with OB/GYNE.    She wants to know if it is safe to continue to work out as she normally has been.    She was advised that she can continue with normal workouts but to not lift heavy weights.    She states that she did have some mild cramping yesterday that has subsided.    She was advised to call back if symptoms return of if new symptoms occur.      Future Appointments   Date Time Provider Department Center   2024 10:00 AM  [facility]   2024  3:20 PM [facility]   2024  2:30 PM Mikayla Murcia MD [facility]   2024  2:00 PM Jigar Kennedy MD [id number]

## 2024-01-08 ENCOUNTER — TELEPHONE (OUTPATIENT)
Dept: OBGYN CLINIC | Facility: CLINIC | Age: 29
End: 2024-01-08

## 2024-01-08 NOTE — TELEPHONE ENCOUNTER
New Pt has her Nurse ED 1/20 & New OB apt 1/25. Pt wants to know if she  earlier apt. Pt missed menses LMP Nov 17 not sure

## 2024-01-09 ENCOUNTER — NURSE TRIAGE (OUTPATIENT)
Dept: FAMILY MEDICINE CLINIC | Facility: CLINIC | Age: 29
End: 2024-01-09

## 2024-01-09 NOTE — TELEPHONE ENCOUNTER
Action Requested: Summary for Provider     []  Critical Lab, Recommendations Needed  [x] Need Additional Advice  []   FYI    []   Need Orders  [] Need Medications Sent to Pharmacy  []  Other     SUMMARY: Patient called, verified name/.  States found out she was pregnant on 2023.  Is trying to get sooner appointment with OB/GYN.  Is taking prenatal vitamins.  Reports mild abdominal cramping since she learned she was pregnant.  She also had this with first pregnancy 5 years ago.  Denies vaginal bleeding.  Reports some mild lower back pain but denies hematuria, malodorous urine.  She has not gotten the HCG blood test done yet that was ordered by Dr Polanco.  Will try to do today.  She wants an ultrasound done, states [facility] but was told they would not do US to check pregnancy.  She is taking prenatal vitamins.  Patient is asking to speak with Dr Polanco, phone is [phone]       Future Appointments   Date Time Provider Department Center   2024 10:00 AM  OB EDUCATION ROOM [facility]   2024  3:20 PM Paola Fields MD [facility]   2024  2:30 PM Mikayla Murcia MD [facility]   2024  2:00 PM Jigar Kennedy MD [address]         Reason for call: Mild abdominal cramping, about 7 weeks pregnant  Onset: [place] she was pregnant 2023.    Reason for Disposition   MILD abdominal pain (e.g., doesn't interfere with normal activities)    Protocols used: Pregnancy - Abdominal Pain Less Than 20 Weeks EGA-A-OH

## 2024-01-10 ENCOUNTER — LAB ENCOUNTER (OUTPATIENT)
Dept: LAB | Age: 29
End: 2024-01-10
Attending: FAMILY MEDICINE
Payer: COMMERCIAL

## 2024-01-10 DIAGNOSIS — N92.6 MISSED MENSES: ICD-10-CM

## 2024-01-10 LAB — HCG SERPL QL: POSITIVE

## 2024-01-10 PROCEDURE — 36415 COLL VENOUS BLD VENIPUNCTURE: CPT

## 2024-01-10 PROCEDURE — 84703 CHORIONIC GONADOTROPIN ASSAY: CPT

## 2024-01-20 ENCOUNTER — LAB ENCOUNTER (OUTPATIENT)
Dept: LAB | Facility: HOSPITAL | Age: 29
End: 2024-01-20
Attending: OBSTETRICS & GYNECOLOGY
Payer: COMMERCIAL

## 2024-01-20 ENCOUNTER — NURSE ONLY (OUTPATIENT)
Dept: OBGYN CLINIC | Facility: CLINIC | Age: 29
End: 2024-01-20
Payer: COMMERCIAL

## 2024-01-20 VITALS — HEIGHT: 61 IN | BODY MASS INDEX: 44.37 KG/M2 | WEIGHT: 235 LBS

## 2024-01-20 DIAGNOSIS — Z34.81 ENCOUNTER FOR SUPERVISION OF OTHER NORMAL PREGNANCY IN FIRST TRIMESTER: Primary | ICD-10-CM

## 2024-01-20 DIAGNOSIS — Z34.81 ENCOUNTER FOR SUPERVISION OF OTHER NORMAL PREGNANCY IN FIRST TRIMESTER: ICD-10-CM

## 2024-01-20 LAB
ANTIBODY SCREEN: NEGATIVE
BASOPHILS # BLD AUTO: 0.06 X10(3) UL (ref 0–0.2)
BASOPHILS NFR BLD AUTO: 0.7 %
DEPRECATED RDW RBC AUTO: 43 FL (ref 35.1–46.3)
EOSINOPHIL # BLD AUTO: 0.28 X10(3) UL (ref 0–0.7)
EOSINOPHIL NFR BLD AUTO: 3 %
ERYTHROCYTE [DISTWIDTH] IN BLOOD BY AUTOMATED COUNT: 12.6 % (ref 11–15)
GLUCOSE 1H P GLC SERPL-MCNC: 122 MG/DL
HBV SURFACE AG SER-ACNC: <0.1 [IU]/L
HBV SURFACE AG SERPL QL IA: NONREACTIVE
HCT VFR BLD AUTO: 42 %
HCV AB SERPL QL IA: NONREACTIVE
HGB BLD-MCNC: 13.9 G/DL
IMM GRANULOCYTES # BLD AUTO: 0.01 X10(3) UL (ref 0–1)
IMM GRANULOCYTES NFR BLD: 0.1 %
LYMPHOCYTES # BLD AUTO: 2.36 X10(3) UL (ref 1–4)
LYMPHOCYTES NFR BLD AUTO: 25.6 %
MCH RBC QN AUTO: 30.6 PG (ref 26–34)
MCHC RBC AUTO-ENTMCNC: 33.1 G/DL (ref 31–37)
MCV RBC AUTO: 92.5 FL
MONOCYTES # BLD AUTO: 0.48 X10(3) UL (ref 0.1–1)
MONOCYTES NFR BLD AUTO: 5.2 %
NEUTROPHILS # BLD AUTO: 6.04 X10 (3) UL (ref 1.5–7.7)
NEUTROPHILS # BLD AUTO: 6.04 X10(3) UL (ref 1.5–7.7)
NEUTROPHILS NFR BLD AUTO: 65.4 %
PLATELET # BLD AUTO: 241 10(3)UL (ref 150–450)
RBC # BLD AUTO: 4.54 X10(6)UL
RH BLOOD TYPE: POSITIVE
RUBV IGG SER QL: POSITIVE
RUBV IGG SER-ACNC: 25.8 IU/ML (ref 10–?)
T PALLIDUM AB SER QL IA: NONREACTIVE
WBC # BLD AUTO: 9.2 X10(3) UL (ref 4–11)

## 2024-01-20 PROCEDURE — 36415 COLL VENOUS BLD VENIPUNCTURE: CPT

## 2024-01-20 PROCEDURE — 87389 HIV-1 AG W/HIV-1&-2 AB AG IA: CPT

## 2024-01-20 PROCEDURE — 86850 RBC ANTIBODY SCREEN: CPT

## 2024-01-20 PROCEDURE — 82950 GLUCOSE TEST: CPT

## 2024-01-20 PROCEDURE — 86901 BLOOD TYPING SEROLOGIC RH(D): CPT

## 2024-01-20 PROCEDURE — 87086 URINE CULTURE/COLONY COUNT: CPT

## 2024-01-20 PROCEDURE — 86780 TREPONEMA PALLIDUM: CPT

## 2024-01-20 PROCEDURE — 86762 RUBELLA ANTIBODY: CPT

## 2024-01-20 PROCEDURE — 87340 HEPATITIS B SURFACE AG IA: CPT

## 2024-01-20 PROCEDURE — 86787 VARICELLA-ZOSTER ANTIBODY: CPT

## 2024-01-20 PROCEDURE — 85025 COMPLETE CBC W/AUTO DIFF WBC: CPT

## 2024-01-20 PROCEDURE — 86900 BLOOD TYPING SEROLOGIC ABO: CPT

## 2024-01-20 PROCEDURE — 3008F BODY MASS INDEX DOCD: CPT | Performed by: OBSTETRICS & GYNECOLOGY

## 2024-01-20 PROCEDURE — 86803 HEPATITIS C AB TEST: CPT

## 2024-01-20 RX ORDER — CHOLECALCIFEROL (VITAMIN D3) 25 MCG
1 TABLET,CHEWABLE ORAL DAILY
COMMUNITY

## 2024-01-20 NOTE — ADDENDUM NOTE
Addended by: BRIAN GREENBERG on: 1/20/2024 12:05 PM     Modules accepted: Orders, Level of Service     I have personally performed a face to face diagnostic evaluation on this patient. I have reviewed the ACP note and agree with the history, exam and plan of care, except as noted.

## 2024-01-20 NOTE — PROGRESS NOTES
Pt seen for OBN appt today with no complaints. Normal PN labs ordered. Pt advised all labs must be completed and resulted prior to NPN appt. If labs are not completed and resulted the NPN appt will be cancelled. Pt informed again of both male and female providers and the need to rotate PN appt with all providers since OB on-call will be the one that delivers her. Assisted pt with scheduling NPN appt with MD.       Height:   Weight:   BMI:     Partner's name is *** contact #***; race: ***  Occupation: ***    MEDICAL HISTORY    Anemia {Yes/No:829:p:\"Yes\"}    Anesthetic complications {Yes/No:829:p:\"Yes\"}    Anxiety/Depression  {Yes/No:829:p:\"Yes\"}    Autoimmune Disorder {Yes/No:829:p:\"Yes\"}    Asthma  {Yes/No:829:p:\"Yes\"}    Cancer {Yes/No:829:p:\"Yes\"}    Diabetes  {Yes/No:829:p:\"Yes\"}    Gyne/breast Surgery {Yes/No:829:p:\"Yes\"}    Heart Disease {Yes/No:829:p:\"Yes\"}    Hepatitis/Liver Disease  {Yes/No:829:p:\"Yes\"}    History of blood transfusion {Yes/No:829:p:\"Yes\"}    History of abnormal pap {Yes/No:829:p:\"Yes\"}    Hypertension  {Yes/No:829:p:\"Yes\"}    Infertility  {Yes/No:829:p:\"Yes\"}    Kidney Disease/Frequent UTIs  {Yes/No:829:p:\"Yes\"}    Medication Allergies {Yes/No:829:p:\"Yes\"}    Latex Allergies {Yes/No:829:p:\"Yes\"}    Food Allergies  {Yes/No:829:p:\"Yes\"}    Neurological Disorder/Epilepsy {Yes/No:829:p:\"Yes\"}    Operations/Hospitalizations {Yes/No:829:p:\"Yes\"}    TB exposure  {Yes/No:829:p:\"Yes\"}    Thyroid Dysfunction {Yes/No:829:p:\"Yes\"}    Trauma/Violence  {Yes/No:829:p:\"Yes\"}    Uterine Anomaly  {Yes/No:829:p:\"Yes\"}    Uterine Fibroids  {Yes/No:829:p:\"Yes\"}    Variocosities/DVTs {Yes/No:829:p:\"Yes\"}    Smoker {Yes/No:829:p:\"Yes\"}    Drug usage in prior year {Yes/No:829:p:\"Yes\"}    Alcohol {Yes/No:829:p:\"Yes\"}    Would you accept a blood transfusion? If no, are you a Restorationism? {Yes/No:829:p:\"Yes\"}    {Yes/No:829:p:\"Yes\"}            INFECTION HISTORY    Chlamydia {Yes/No:829:p:\"Yes\"}    Pt or partner  have hx of Genital Herpes {Yes/No:829:p:\"Yes\"}    Gonorrhea {Yes/No:829:p:\"Yes\"}    Hepatitis B {Yes/No:829:p:\"Yes\"}    HIV {Yes/No:829:p:\"Yes\"}    HPV {Yes/No:829:p:\"Yes\"}    MRSA {Yes/No:829:p:\"Yes\"}    Syphilis {Yes/No:829:p:\"Yes\"}    Tattoos {Yes/No:829:p:\"Yes\"}    Live with someone or Exposed to TB {Yes/No:829:p:\"Yes\"}    Rash or viral illness since LMP  {Yes/No:829:p:\"Yes\"}    Varicella {Yes/No:829:p:\"Yes\"}    Pets {Yes/No:829:p:\"Yes\"}        GENETICS SCREENING    Genetic Screening    Genetic Screening/Teratology Counseling- Includes patient, baby's father, or anyone in either family with:  Patient's age 35 years or older as of estimated date of delivery: No     Thalassemia (Italian, Greek, Mediterranean, or  background): MCV less than 80: No     Neural tube defect (Meningomyelocele, Spina bifida, or Anencephaly): No     Congenital heart defect: No     Down syndrome: No     Darion-Sachs (Ashkenazi Congregation, Cajun, Peruvian Senegalese): No     Canavan disease (Ashkenazi Congregation): No     Familial dysautonomia (Ashkenazi Congregation): No     Sickle cell disease or trait (): No     Hemophilia or other blood disorders: No     Muscular dystrophy: No    Cystic fibrosis: No     Revillo's chorea: No     Intellectual disability and/or autism: Yes (Comment: FOB's sister has intellectual disability)     If yes, was the person tested for Fragile X?: No     Other inherited genetic or chromosomal disorder: No     Maternal metabolic disorder (eg. Type 1 diabetes, PKU): No     Patient or baby's father had child with birth defects not listed above: No     Recurrent pregnancy loss, or a stillbirth: No     Medications (including supplements, vitamins, herbs, or OTC drugs)/illicit/recreational drugs/alcohol since last menstrual period: No                 MISC    Infant vaccinations  {Yes/No:829:p:\"Yes\"}

## 2024-01-20 NOTE — PROGRESS NOTES
Pt seen for [facility] appt today with no complaints. Approximate LMP of 11/17/23, within a week. Pt reports monthly periods for the past year or so. Before that they were irregular. Normal PN labs plus GTT ordered. Pt advised all labs must be completed and resulted prior to NPN appt. If labs are not completed and resulted the NPN appt will be cancelled. Pt informed again of both male and female providers and the need to rotate PN appt with all providers since OB on-call will be the one that delivers her. Pt already has NPN with PAMELLA.      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     MEDICAL HISTORY    Anemia No    Anesthetic complications No    Anxiety/Depression  No    Autoimmune Disorder No    Asthma  No Childhood    Cancer No    Diabetes  No Was pre diabetic \"in the past\"   Gyne/breast Surgery Yes C/S 2019   Heart Disease No    Hepatitis/Liver Disease  No    History of blood transfusion No    History of abnormal pap No    Hypertension  Yes Towards the end of pregnancy she had HTN, per pt as prescribed baby ASA at end of pregnancy   Infertility  No    Kidney Disease/Frequent UTIs  No    Medication Allergies No    Latex Allergies No    Food Allergies  No    Neurological Disorder/Epilepsy No    Operations/Hospitalizations Yes  Lap cholecystectomy, eye surgery    TB exposure  No    Thyroid Dysfunction Yes  Thinks her levels were off but never took meds, has a nodule on thyroid.    Trauma/Violence  No    Uterine Anomaly  No    Uterine Fibroids  No    Variocosities/DVTs No    Smoker No    Drug usage in prior year No    Alcohol No    Would you accept a blood transfusion? If no, are you a Bahai? Yes            INFECTION HISTORY    Chlamydia No    Pt or partner have hx of Genital Herpes No    Gonorrhea No    Hepatitis B No    HIV No    HPV No    MRSA No    Syphilis No    Tattoos No    Live with  someone or Exposed to TB No    Rash or viral illness since LMP  No    Varicella No    Pets No        GENETICS SCREENING        Genetic Screening    Genetic Screening/Teratology Counseling- Includes patient, baby's father, or anyone in either family with:  Patient's age 35 years or older as of estimated date of delivery: No     Thalassemia (Italian, Greek, Mediterranean, or  background): MCV less than 80: No     Neural tube defect (Meningomyelocele, Spina bifida, or Anencephaly): No     Congenital heart defect: No     Down syndrome: No     Darion-Sachs (Ashkenazi Yazdanism, Cajun, Ukrainian Antelope): No     Canavan disease (Ashkenazi Yazdanism): No     Familial dysautonomia (Ashkenazi Yazdanism): No     Sickle cell disease or trait (): No     Hemophilia or other blood disorders: No     Muscular dystrophy: No    Cystic fibrosis: No     Henri's chorea: No     Intellectual disability and/or autism: Yes (Comment: FOKIRA's sister has intellectual disability)     If yes, was the person tested for Fragile X?: No     Other inherited genetic or chromosomal disorder: No     Maternal metabolic disorder (eg. Type 1 diabetes, PKU): No     Patient or baby's father had child with birth defects not listed above: No     Recurrent pregnancy loss, or a stillbirth: No     Medications (including supplements, vitamins, herbs, or OTC drugs)/illicit/recreational drugs/alcohol since last menstrual period: No                     MISC    Infant vaccinations  Yes

## 2024-01-22 LAB — VZV IGG SER IA-ACNC: 425.8 (ref 165–?)

## 2024-01-25 ENCOUNTER — INITIAL PRENATAL (OUTPATIENT)
Dept: OBGYN CLINIC | Facility: CLINIC | Age: 29
End: 2024-01-25

## 2024-01-25 VITALS
WEIGHT: 230.81 LBS | BODY MASS INDEX: 44 KG/M2 | DIASTOLIC BLOOD PRESSURE: 79 MMHG | HEART RATE: 84 BPM | SYSTOLIC BLOOD PRESSURE: 116 MMHG

## 2024-01-25 DIAGNOSIS — Z34.91 ENCOUNTER FOR SUPERVISION OF NORMAL PREGNANCY IN FIRST TRIMESTER, UNSPECIFIED GRAVIDITY: Primary | ICD-10-CM

## 2024-01-25 DIAGNOSIS — O26.841 SIZE OF FETUS INCONSISTENT WITH DATES IN FIRST TRIMESTER: ICD-10-CM

## 2024-01-25 PROBLEM — E78.1 HYPERTRIGLYCERIDEMIA: Status: RESOLVED | Noted: 2020-07-06 | Resolved: 2024-01-25

## 2024-01-25 PROBLEM — E88.819 INSULIN RESISTANCE: Status: RESOLVED | Noted: 2017-06-26 | Resolved: 2024-01-25

## 2024-01-25 PROBLEM — Z98.891 HISTORY OF C-SECTION: Status: ACTIVE | Noted: 2024-01-25

## 2024-01-25 PROBLEM — R76.8 POSITIVE ANA (ANTINUCLEAR ANTIBODY): Status: RESOLVED | Noted: 2020-12-19 | Resolved: 2024-01-25

## 2024-01-25 PROBLEM — R21 RASH OF FACE: Status: RESOLVED | Noted: 2020-12-19 | Resolved: 2024-01-25

## 2024-01-25 PROBLEM — R63.5 WEIGHT GAIN: Status: RESOLVED | Noted: 2020-07-06 | Resolved: 2024-01-25

## 2024-01-25 PROBLEM — E66.01 MORBID OBESITY WITH BMI OF 45.0-49.9, ADULT (HCC): Status: RESOLVED | Noted: 2019-04-11 | Resolved: 2024-01-25

## 2024-01-25 PROBLEM — R77.1 ELEVATED SERUM GLOBULIN LEVEL: Status: RESOLVED | Noted: 2020-12-02 | Resolved: 2024-01-25

## 2024-01-25 LAB
APPEARANCE: CLEAR
BILIRUBIN: NEGATIVE
GLUCOSE (URINE DIPSTICK): NEGATIVE MG/DL
LEUKOCYTES: NEGATIVE
MULTISTIX LOT#: NORMAL NUMERIC
NITRITE, URINE: NEGATIVE
OCCULT BLOOD: NEGATIVE
PH, URINE: 7 (ref 4.5–8)
PROTEIN (URINE DIPSTICK): NEGATIVE MG/DL
SPECIFIC GRAVITY: 1.02 (ref 1–1.03)
URINE-COLOR: YELLOW
UROBILINOGEN,SEMI-QN: 0.2 MG/DL (ref 0–1.9)

## 2024-01-25 PROCEDURE — 81002 URINALYSIS NONAUTO W/O SCOPE: CPT | Performed by: OBSTETRICS & GYNECOLOGY

## 2024-01-25 PROCEDURE — 3074F SYST BP LT 130 MM HG: CPT | Performed by: OBSTETRICS & GYNECOLOGY

## 2024-01-25 PROCEDURE — 3078F DIAST BP <80 MM HG: CPT | Performed by: OBSTETRICS & GYNECOLOGY

## 2024-01-25 PROCEDURE — 76815 OB US LIMITED FETUS(S): CPT | Performed by: OBSTETRICS & GYNECOLOGY

## 2024-01-25 NOTE — PROGRESS NOTES
Here with partner.  Declined FTS.  LMP approx.  Last pap 11/2023.  Gc/chlamydia/trichomonas.  Bedside ultrasound--+FHT, c/w 7+ wks.  Will get formal dating ultrasound.   RTC 4 wk.

## 2024-01-26 LAB
C TRACH DNA SPEC QL NAA+PROBE: NEGATIVE
N GONORRHOEA DNA SPEC QL NAA+PROBE: NEGATIVE
T VAGINALIS RRNA SPEC QL NAA+PROBE: NEGATIVE

## 2024-01-27 ENCOUNTER — HOSPITAL ENCOUNTER (OUTPATIENT)
Dept: ULTRASOUND IMAGING | Age: 29
Discharge: HOME | End: 2024-01-27
Attending: OBSTETRICS & GYNECOLOGY
Payer: COMMERCIAL

## 2024-01-27 DIAGNOSIS — O26.841 SIZE OF FETUS INCONSISTENT WITH DATES IN FIRST TRIMESTER: ICD-10-CM

## 2024-01-27 PROCEDURE — 76817 TRANSVAGINAL US OBSTETRIC: CPT | Performed by: OBSTETRICS & GYNECOLOGY

## 2024-01-27 PROCEDURE — 76801 OB US < 14 WKS SINGLE FETUS: CPT | Performed by: OBSTETRICS & GYNECOLOGY

## 2024-02-20 ENCOUNTER — LAB ENCOUNTER (OUTPATIENT)
Dept: LAB | Facility: REFERENCE LAB | Age: 29
End: 2024-02-20
Attending: OBSTETRICS & GYNECOLOGY
Payer: COMMERCIAL

## 2024-02-20 ENCOUNTER — INITIAL PRENATAL (OUTPATIENT)
Dept: OBGYN CLINIC | Facility: CLINIC | Age: 29
End: 2024-02-20
Payer: COMMERCIAL

## 2024-02-20 VITALS
SYSTOLIC BLOOD PRESSURE: 148 MMHG | BODY MASS INDEX: 44.53 KG/M2 | WEIGHT: 235.88 LBS | DIASTOLIC BLOOD PRESSURE: 88 MMHG | HEIGHT: 61 IN

## 2024-02-20 DIAGNOSIS — O09.91 SUPERVISION OF HIGH RISK PREGNANCY IN FIRST TRIMESTER (HCC): ICD-10-CM

## 2024-02-20 DIAGNOSIS — O09.91 SUPERVISION OF HIGH RISK PREGNANCY IN FIRST TRIMESTER (HCC): Primary | ICD-10-CM

## 2024-02-20 DIAGNOSIS — O16.9 ELEVATED BLOOD PRESSURE AFFECTING PREGNANCY, ANTEPARTUM (HCC): ICD-10-CM

## 2024-02-20 PROCEDURE — 3077F SYST BP >= 140 MM HG: CPT | Performed by: OBSTETRICS & GYNECOLOGY

## 2024-02-20 PROCEDURE — 36415 COLL VENOUS BLD VENIPUNCTURE: CPT

## 2024-02-20 PROCEDURE — 83021 HEMOGLOBIN CHROMOTOGRAPHY: CPT

## 2024-02-20 PROCEDURE — 83020 HEMOGLOBIN ELECTROPHORESIS: CPT

## 2024-02-20 PROCEDURE — 3008F BODY MASS INDEX DOCD: CPT | Performed by: OBSTETRICS & GYNECOLOGY

## 2024-02-20 PROCEDURE — 3079F DIAST BP 80-89 MM HG: CPT | Performed by: OBSTETRICS & GYNECOLOGY

## 2024-02-20 NOTE — PROGRESS NOTES
[facility]  Obstetrics and Gynecology  History & Physical    CC: Patient is here to establish prenatal care     Subjective:     HPI:  Mary Chatman is a 28 year old  female at 10w5d who presents today to establish prenatal care. Patient reports no cramping or bleeding. Had been seen by previous practice but told she must have a repeat , limit her lifting weight to 25 lbs, and limit her heart rate to 150 BPM. I discussed that these recommendations have not been proven. We briefly discussed that a vaginal trial of labor can be reasonable without a proven contraindication for vaginal delivery.       Denies history of chronic HTN but noted gestational HTN with previous pregnancy.     LMP: Patient's last menstrual period was 2023 (approximate).  SHAKEEL:  Estimated Date of Delivery: 24    OB:  OB History    Para Term  AB Living   2 1 1 0 0 1   SAB IAB Ectopic Multiple Live Births   0 0 0 0 1      # Outcome Date GA Lbr [place]/2nd Weight Sex Delivery Anes PTL Lv   2 Current            1 Term 19 41w1d 20:06 :18 8 lb 13.1 oz (4 kg) M Caesarean EPI N CHUNG      Birth Comments: Was on oxygen for TTN in [place]  Passed hearing test and cardiac screen  Received vitamin K, EES, hep B  No jaundice  Possible hypospadias      Complications: Fetal intolerance to labor, Preeclampsia     GYN:   Pap hx- Last Pap 2024: NILM, HPV negative.    PMH:   Past Medical History:   Diagnosis Date    Asthma (HCC)     Gestational hypertension (HCC)     PCOS (polycystic ovarian syndrome)     Thyroid nodule     Vitamin D deficiency        PSH:    Past Surgical History:   Procedure Laterality Date          DERMATOLOGICAL PROCEDURE      Facial abscess removed    EACH ADD TOOTH EXTRACTION      no associated bleeding complications    REMOVAL GALLBLADDER  2010    TONSILLECTOMY         MEDS:  Current Outpatient Medications on File Prior to Visit   Medication Sig Dispense Refill     prenatal vitamin with DHA 27-0.8-228 MG Oral Cap Take 1 capsule by mouth daily.       No current facility-administered medications on file prior to visit.       Allergies:    No Known Allergies    Immunizations:  Immunization History   Administered Date(s) Administered    Covid-19 Vaccine Pfizer 30 mcg/0.3 ml 03/03/2021, 12/29/2021    DTAP 10/17/1995, 01/09/1996, 03/02/1996, 08/21/1996, 04/26/2000    DTP 10/17/1995, 01/09/1996, 03/02/1996, 08/21/1996    FLU VAC QIV SPLIT 3 YRS AND OLDER (68349) 10/22/2019    FLULAVAL 6 months & older 0.5 ml Prefilled syringe (22787) 10/22/2019, 12/02/2020    FLUZONE 6 months and older PFS 0.5 ml (95934) 10/08/2018    Fluvirin, 3 Years & >, Im 11/13/2004    HEP A 07/07/2012, 01/07/2016    HEP B 08/15/1995, 09/19/1995, 03/02/1996    HEP B, Ped/Adol 08/15/1995, 09/19/1995, 03/02/1996    HIB 10/17/1995, 01/09/1996, 03/02/1996, 08/21/1996    Hep A, Adult 01/07/2016    Hib, Unspecified Formulation 10/17/1995, 01/09/1996, 03/02/1996, 08/21/1996    IPV 10/17/1995, 10/17/1995, 01/09/1996, 01/09/1996, 03/02/1996, 03/02/1996, 09/20/1997, 09/20/1997, 04/26/2000, 04/26/2000    Influenza 11/13/2004, 10/17/2009, 11/03/2011, 10/16/2012    Influenza Virus Vaccine, H1N1 01/13/2010    MMR 08/21/1996, 08/21/1996, 04/26/2000, 04/26/2000    Meningococcal (Menomune) 05/09/2009    OPV 10/17/1995, 01/09/1996, 03/02/1996    TDAP 05/09/2009, 02/09/2019    Tb Intradermal Test 03/25/2016    Varicella 05/09/2009, 08/21/2010   Pended Date(s) Pended    Tb Intradermal Test 03/17/2016       Family Hx:      Family History   Problem Relation Age of Onset    Thyroid disease Father     Other (Other) Mother     Other (epilepsy) Son 2    Other (Other) Son     Thyroid disease Maternal Grandmother     Diabetes Paternal Grandmother     Cancer Paternal Grandmother     Diabetes Paternal Grandfather     Heart Disorder Neg     Hypertension Neg     Anemia Neg     Bleeding Disorders Neg     Clotting Disorder Neg        SocialHx:         Social History     Socioeconomic History    Marital status:    Tobacco Use    Smoking status: Never    Smokeless tobacco: Never   Vaping Use    Vaping Use: Never used   Substance and Sexual Activity    Alcohol use: No     Alcohol/week: 0.0 standard drinks of alcohol    Drug use: No    Sexual activity: Yes     Partners: Male     Birth control/protection: Condom   Other Topics Concern    Caffeine Concern Yes     Comment: 1 cup soda/coffee per day   Social History Narrative    Mary is  x 3 yrs. Mother of 1 son. 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 Mary and her family live in [place], [place].     Social Determinants of Health     Financial Resource Strain: Low Risk  (1/20/2024)    Financial Resource Strain     Difficulty of Paying Living Expenses: Not hard at all     Med Affordability: No   Food Insecurity: No Food Insecurity (1/20/2024)    Food Insecurity     Food Insecurity: Never true   Transportation Needs: No Transportation Needs (1/20/2024)    Transportation Needs     Lack of Transportation: No   Stress: No Stress Concern Present (1/20/2024)    Stress     Feeling of Stress : No   Housing Stability: Low Risk  (1/20/2024)    Housing Stability     Housing Instability: No     Review of Systems:  General: no complaints  Eyes: no complaints  Respiratory: no complaints  Cardiovascular: no complaints  GI: no complaints  : See HPI  Hematological/lymphatic: no complaints  Breast: no complaints  Psychiatric: no complaints  Endocrine:no complaints  Neurological: no complaints  Immunological: no complaints  Musculoskeletal:no complaints    Objective:     Vitals:    02/20/24 1412 02/20/24 1413 02/20/24 1416   BP: 146/90 (!) 152/92 148/88   Weight: 235 lb 14.4 oz (107 kg)     Height: 61\"          Exam:   GENERAL: well developed, well nourished, in no apparent distress, alert and orientated X 3  PSYCH: mood and affect stable   SKIN: no rashes, no lesions  HEENT: normal  LUNGS: respiration  unlabored  CARDIOVASCULAR: no peripheral edema or varicosities, skin warm and dry  ABDOMEN: Soft, non distended; non tender.   EXTREMITIES:  Normal range of motion, strength 5/5 walking,    Fetal Well Being Assessment:    TAUS: CRL grossly consistent with 1st trimester ultrasound dating.  Doppler Mode  BPM. Payne, live IUP.     Assessment/Plan:     Mary Chatman is a 28 year old  female at 10w5d who presents today to establish prenatal care.    Patient Active Problem List   Diagnosis    Vitamin D deficiency    Prediabetes    History of     BMI 40.0-44.9, adult ([facility])     Prenatal care  - prenatal labs ordered  - Pap: up to date.    - continue PNV daily. Two ASA 81 mg at 16 weeks.   - RN education visit ASAP. Labs done previously. Ordered hemoglobin electrophoresis.   - NATTY at 15 weeks.   - blood presure elevated today. If elevated at next visit will discuss addition of medication.     Genetic Screening tests  - Discussion held with patient about genetic screening: Cell free DNA and amniocentesis.  - Patient offered Amniocentesis and declined.   - Pt declined cell free DNA.  - Myriad Carrier screening including Cystic fibrosis, SMA, and hemoglobinopathies: offered and patient declined.       Patient education  - Pt counseled on safety, diet, exercise, caffiene, tobacco, ETOH, sexual activity, ER precautions, diet, exercise, appropriate weight gain, travel, appropriate otc medication use, substance abuse and pets.  - Counseled on taking a PNV with 0.4mg of folic acid   - Limiting intake of alcohol, coffee, tea, soda, and other foods containing caffeine  - Limit intake of fish to twice weekly to limit mercury exposure  - Pregnancy BMI guidelines: Prepregnancy weight: 225 lbs. Weight today 235 lbs. BMI 45. Expected Weigh gain 11-20 lbs. TWG +10 lbs.  - Discussed no limit on weight bearing more than what she is used to doing already. Discussed that maternal heart rate has no upper limit of  normal but if she feels unwell with higher heart rate to stop and revisit working out in a day or 2.     RTC in 5 weeks     Dr. Georges Blue MD    [facility]     This note was created by [employer] voice recognition. Errors in content may be related to improper recognition by the system; efforts to review and correct have been done but errors may still exist. Please be advised the primary purpose of this note is for me to communicate medical care. Standard sentence structure is not always used. Medical terminology and medical abbreviations may be used. There may be grammatical, typographical, and automated fill ins that may have errors missed in proofreading.

## 2024-02-21 LAB
HGB A2 MFR BLD: 2.8 % (ref 1.5–3.5)
HGB PNL BLD ELPH: 97.2 % (ref 95.5–100)

## 2024-03-04 ENCOUNTER — NURSE ONLY (OUTPATIENT)
Dept: OBGYN CLINIC | Facility: CLINIC | Age: 29
End: 2024-03-04
Payer: COMMERCIAL

## 2024-03-04 NOTE — PROGRESS NOTES
Pt is a G 2  P  1 for RN OB Education.       Pregnancy Confirmation apt with: GIFTY    LMP: 2023    US: 2024 at 7w 2d    Working SHAKEEL:  2024    Pre  BMI:   44.57    Medical Hx significant for: Medical History    Past Medical History    Diagnosis Date Comments   Vitamin D deficiency [E55.9]     Asthma (HCC) [J45.909]     PCOS (polycystic ovarian syndrome) [E28.2]     Thyroid nodule [E04.1]     Gestational hypertension (HCC) [O13.9]         Obstetrical Hx significant for: c/section and gestational hypertesion    Surgical Hx significant for: Past Surgical History     Laterality Date Comments   TONSILLECTOMY []      REMOVAL GALLBLADDER [[id number]]      DERMATOLOGICAL PROCEDURE [47601]   Facial abscess removed    []      EACH ADD TOOTH EXTRACTION []   no associated bleeding complications       EPDS score: 0    OUD Screening: Patient has answered NO to 5p questions and has no  risk factors.     Patient given \"What Pregnant Women Need to Know\" handout.     Educational material reviewed with patient: Prenatal care, nutrition, weight gain recommendations, travel, exercise, intercourse, pregnancy changes, safe medications, pregnancy and work, fetal movement, labor and  labor, warning signs, food safety, tdap, cord blood, breastfeeding, circumcision, and Group B strep.     Pt agrees to blood transfusion if needed: yes    PN labs ordered     Optional genetic screening discussed.  Pt declined both.    [facility] Policy: Reviewed and verbalized understanding.     NOB appt: completed NATTY with SEBASTIÁN 2024    Lab appt: completed    Vaccines: informed    ASA protocol :  yes per Jn's noted at 16 weeks 2 tabs    SODH:  completed

## 2024-03-22 ENCOUNTER — LAB ENCOUNTER (OUTPATIENT)
Dept: LAB | Facility: HOSPITAL | Age: 29
End: 2024-03-22
Attending: INTERNAL MEDICINE
Payer: COMMERCIAL

## 2024-03-22 ENCOUNTER — OFFICE VISIT (OUTPATIENT)
Dept: ENDOCRINOLOGY CLINIC | Facility: CLINIC | Age: 29
End: 2024-03-22

## 2024-03-22 VITALS
SYSTOLIC BLOOD PRESSURE: 120 MMHG | HEART RATE: 76 BPM | DIASTOLIC BLOOD PRESSURE: 76 MMHG | WEIGHT: 235 LBS | HEIGHT: 61 IN | BODY MASS INDEX: 44.37 KG/M2

## 2024-03-22 DIAGNOSIS — E04.1 THYROID NODULE: Primary | ICD-10-CM

## 2024-03-22 DIAGNOSIS — E06.3 HASHIMOTO'S DISEASE: ICD-10-CM

## 2024-03-22 DIAGNOSIS — E04.1 THYROID NODULE: ICD-10-CM

## 2024-03-22 LAB — TSI SER-ACNC: 2.5 MIU/ML (ref 0.55–4.78)

## 2024-03-22 PROCEDURE — 3008F BODY MASS INDEX DOCD: CPT | Performed by: INTERNAL MEDICINE

## 2024-03-22 PROCEDURE — 3074F SYST BP LT 130 MM HG: CPT | Performed by: INTERNAL MEDICINE

## 2024-03-22 PROCEDURE — 84443 ASSAY THYROID STIM HORMONE: CPT

## 2024-03-22 PROCEDURE — 36415 COLL VENOUS BLD VENIPUNCTURE: CPT

## 2024-03-22 PROCEDURE — 3078F DIAST BP <80 MM HG: CPT | Performed by: INTERNAL MEDICINE

## 2024-03-22 PROCEDURE — 99213 OFFICE O/P EST LOW 20 MIN: CPT | Performed by: INTERNAL MEDICINE

## 2024-03-22 NOTE — PROGRESS NOTES
Return Office Visit     CHIEF COMPLAINT:    Thyroid nodule   Hashimotos disease    HISTORY OF PRESENT ILLNESS:  Mary Chatman is a 28 year old female who presents for follow up for thyroid nodule  She was noted to have low TSH levels. NM thyroid scan and uptake showed a possible left hot nodule.  Recent thyroid US did not show any nodule on the left side, but showed a nodule on the right side  Right sided nodule: benign on FNA    Personal or Family history of thyroid cancer: denies  Personal or family history of MEN: denies  Exposure to head and neck radiation before age 20: denies  Compressive symptoms (difficulty in breathing or swallowing): denies  On anti coagulation or steroids: denies     Mother and Grand mother have hypothyroidism      She is 15 weeks pregnant   Doing well   Pregnancy is going okay so far.     CURRENT MEDICATION:    Current Outpatient Medications   Medication Sig Dispense Refill    prenatal vitamin with DHA 27-0.8-228 MG Oral Cap Take 1 capsule by mouth daily.           ALLERGY:  No Known Allergies    PAST MEDICAL, SOCIAL AND FAMILY HISTORY:  See past medical history marked as reviewed.  See past surgical history marked as reviewed.  See past family history marked as reviewed.  See past social history marked as reviewed.    ASSESSMENTS:     REVIEW OF SYSTEMS:  Constitutional: Negative for: weight change, fever,  fatigue, cold/heat intolerance  Eyes: Negative for:  Visual changes, proptosis, blurring  ENT: Negative for:  dysphagia, neck swelling, dysphonia  Respiratory: Negative for:  dyspnea, cough  Cardiovascular: Negative for:  chest pain, palpitations, orthopnea  GI: Negative for:  abdominal pain, + nausea, vomiting, diarrhea, constipation, bleeding  Neurology: Negative for: headache, numbness, weakness  Genito-Urinary: Negative for: dysuria, frequency  Psychiatric: Negative for:  depression, anxiety  Hematology/Lymphatics: Negative for: bruising, lower extremity edema  Endocrine:  Negative for: polyuria, polydypsia  Skin: Negative for: rash, blister, cellulitis,       PHYSICAL EXAM:     Vitals reviewed    General Appearance:  alert, well developed, in no acute distress  Head: Atraumatic  Eyes:  normal conjunctivae, sclera., normal sclera and normal pupils  Throat/Neck: normal sound to voice. Normal hearing, normal speech, no significant thyroid enlargement noted  Respiratory:  Speaking in full sentences, non-labored. no increased work of breathing, no audible wheezing    Neuro: motor grossly intact, moving all extremities without difficulty  Psychiatric:  oriented to time, self, and place  Extremities: no obvious extremity swelling, no lesions    DATA:     Pertinent data reviewed    ASSESSMENT AND PLAN:    Patient is a 28 year old female who is 15 weeks pregnant.     1.  Right thyroid nodule    -Discussed common occurrence of thyroid nodules in the population approx 50-60% of the population  -Discussed risk of malignancy is approx 3-5%, 95-97% of nodules are benign  -Discussed recommendation to perform FNA biopsy of nodules greater than 1cm in size  -Discussed that if nodule is benign then plan to follow with yearly thyroid ultrasound    Right thyroid nodule has been stable.   No compressive symptoms  Will get a thyroid US    2. Hashimotos  She has positive thyroid AB, hence recommend TSH monitoring during pregnancy   Treatment with L-thyroxine should be considered in women of childbearing age with normal serum TSH levels when they are pregnant or planning a pregnancy, if they have or have had positive levels of  serum TPOAb, particularly when there is a history  of miscarriage or past history of hypothyroidism.     Women with positive levels of serum TPOAb or with a TSH greater than 2.5 mIU/L who are not being treated with L-thyroxine should be monitored every 4 weeks  in the first 20 weeks of pregnancy for the development  of hypothyroidism    TSH ordered  She will call for results              RTC in 9 -10 months  Call for results      Patient verbalized a complete  understanding of all of the above and did not have any further questions.       Mikayla Murcia MD

## 2024-03-25 ENCOUNTER — PATIENT MESSAGE (OUTPATIENT)
Dept: ENDOCRINOLOGY CLINIC | Facility: CLINIC | Age: 29
End: 2024-03-25

## 2024-03-25 DIAGNOSIS — E06.3 HASHIMOTO'S DISEASE: Primary | ICD-10-CM

## 2024-03-25 NOTE — TELEPHONE ENCOUNTER
From: Mary Chatman  To: Mikayla Waddellnda  Sent: 3/25/2024 11:23 AM CDT  Subject: Test follow up     So recapping and clarifying our last visit conversation so you said if my level was 3 or higher I would need medication for a normal gestational pregnancy, but it was below so no medication needed. Also I think I get confuse how sometimes my levels are normal and sometimes they’re not. Initially when I first came to you they weren’t and that’s when we discover my nodule. Now they’re normal. So what does that mean when it’s fluctuating. Do I necessarily have an over active thyroid or under active. There is 2 types right? Is my nodule after being confirmed benign something will have to just monitor yearly for the rest of my life?

## 2024-03-26 ENCOUNTER — PATIENT MESSAGE (OUTPATIENT)
Dept: OBGYN CLINIC | Facility: CLINIC | Age: 29
End: 2024-03-26

## 2024-03-26 ENCOUNTER — ROUTINE PRENATAL (OUTPATIENT)
Dept: OBGYN CLINIC | Facility: CLINIC | Age: 29
End: 2024-03-26
Payer: COMMERCIAL

## 2024-03-26 ENCOUNTER — LAB ENCOUNTER (OUTPATIENT)
Dept: LAB | Facility: REFERENCE LAB | Age: 29
End: 2024-03-26
Attending: OBSTETRICS & GYNECOLOGY
Payer: COMMERCIAL

## 2024-03-26 VITALS
BODY MASS INDEX: 44.27 KG/M2 | SYSTOLIC BLOOD PRESSURE: 128 MMHG | DIASTOLIC BLOOD PRESSURE: 78 MMHG | HEIGHT: 61 IN | WEIGHT: 234.5 LBS

## 2024-03-26 DIAGNOSIS — O09.299 HX OF PREECLAMPSIA, PRIOR PREGNANCY, CURRENTLY PREGNANT (HCC): Chronic | ICD-10-CM

## 2024-03-26 DIAGNOSIS — O99.210 OBESITY IN PREGNANCY (HCC): ICD-10-CM

## 2024-03-26 DIAGNOSIS — E06.3 HASHIMOTO'S DISEASE: ICD-10-CM

## 2024-03-26 DIAGNOSIS — O16.9 ELEVATED BLOOD PRESSURE AFFECTING PREGNANCY, ANTEPARTUM (HCC): Primary | Chronic | ICD-10-CM

## 2024-03-26 DIAGNOSIS — O16.9 ELEVATED BLOOD PRESSURE AFFECTING PREGNANCY, ANTEPARTUM (HCC): Chronic | ICD-10-CM

## 2024-03-26 PROBLEM — E66.01 MORBID OBESITY WITH BMI OF 45.0-49.9, ADULT (HCC): Chronic | Status: RESOLVED | Noted: 2019-04-11 | Resolved: 2024-01-25

## 2024-03-26 PROBLEM — R73.03 PREDIABETES: Chronic | Status: ACTIVE | Noted: 2020-07-06

## 2024-03-26 PROBLEM — Z98.891 HISTORY OF C-SECTION: Chronic | Status: ACTIVE | Noted: 2024-01-25

## 2024-03-26 LAB
CREAT UR-SCNC: 42.8 MG/DL
EST. AVERAGE GLUCOSE BLD GHB EST-MCNC: 105 MG/DL (ref 68–126)
HBA1C MFR BLD: 5.3 % (ref ?–5.7)
PROT UR-MCNC: <6 MG/DL (ref ?–14)
TSI SER-ACNC: 2.68 MIU/ML (ref 0.55–4.78)

## 2024-03-26 PROCEDURE — 82570 ASSAY OF URINE CREATININE: CPT

## 2024-03-26 PROCEDURE — 3074F SYST BP LT 130 MM HG: CPT | Performed by: OBSTETRICS & GYNECOLOGY

## 2024-03-26 PROCEDURE — 84443 ASSAY THYROID STIM HORMONE: CPT

## 2024-03-26 PROCEDURE — 84156 ASSAY OF PROTEIN URINE: CPT

## 2024-03-26 PROCEDURE — 83036 HEMOGLOBIN GLYCOSYLATED A1C: CPT

## 2024-03-26 PROCEDURE — 36415 COLL VENOUS BLD VENIPUNCTURE: CPT

## 2024-03-26 PROCEDURE — 3008F BODY MASS INDEX DOCD: CPT | Performed by: OBSTETRICS & GYNECOLOGY

## 2024-03-26 PROCEDURE — 3078F DIAST BP <80 MM HG: CPT | Performed by: OBSTETRICS & GYNECOLOGY

## 2024-03-26 RX ORDER — ASPIRIN 81 MG/1
120 TABLET, CHEWABLE ORAL DAILY
Qty: 180 TABLET | Refills: 3 | Status: SHIPPED | OUTPATIENT
Start: 2024-03-26

## 2024-03-26 NOTE — PROGRESS NOTES
27 y/o  at 15 w  Routine PN care: check AFP. Refer to MFM for consult and usn.  Hx of preeclampsia: Dw pt recommend ASA. Had recent comp/met. Random urine prot:Cr ratio ordered  Obesity in pregnancy: normal 1st trimester glucola. Check hemoglobin A1c.

## 2024-03-27 ENCOUNTER — HOSPITAL ENCOUNTER (OUTPATIENT)
Dept: ULTRASOUND IMAGING | Age: 29
Discharge: HOME | End: 2024-03-27
Attending: INTERNAL MEDICINE
Payer: COMMERCIAL

## 2024-03-27 ENCOUNTER — LAB ENCOUNTER (OUTPATIENT)
Dept: LAB | Age: 29
End: 2024-03-27
Attending: INTERNAL MEDICINE
Payer: COMMERCIAL

## 2024-03-27 DIAGNOSIS — E04.1 THYROID NODULE: ICD-10-CM

## 2024-03-27 PROCEDURE — 76536 US EXAM OF HEAD AND NECK: CPT | Performed by: INTERNAL MEDICINE

## 2024-03-27 PROCEDURE — 82105 ALPHA-FETOPROTEIN SERUM: CPT

## 2024-03-27 PROCEDURE — 36415 COLL VENOUS BLD VENIPUNCTURE: CPT

## 2024-03-27 NOTE — TELEPHONE ENCOUNTER
LMTCB          From: Mary Chatman  To: Harleen Gabriel  Sent: 3/26/2024  3:52 PM CDT  Subject: Follow up question     Hi Dr. Potts,    I wanted to clarify some things after our appointment today. You send me to do some bloodwork. I completed 2 out of 3. One that you sent was the maternal serum I saw on my after summary is that the genetic testing? That’s one I said previously I didn’t want to do. I also wanted to know if with the referral you sent for the ultrasound if they will be able to tell me gender on that day. I’m planning a gender reveal and I anticipated of having it the weekend of 4/27 since that’s when I would be 20 weeks and that’s when I was told I could find out gender. But I was thinking maybe I should just do the blood test to find out the gender so that I can plan accordingly. Can the gender results be emailed to another person so that it can be a surprise. Not sure how it works it’s my first time trying to plan for a gender reveal. Also with the referral for the ultrasound do you prefer for me to see you after I completed ultrasound? If so I would have to reschedule my follow up appointment and it   will be closer to a  5 week follow up if that’s okay? Thanks and hope to hear from you soon!

## 2024-03-29 LAB
AFP MOM: 0.82
AFP VALUE: 18.6 NG/ML
GA ON COLL DATE: 15.1 WEEKS
INSULIN DEP AFP: NO
MAT AGE AT EDD: 29 YR
MULTIPLE GEST AFP: NO
OSBR RISK 1 IN AFP: NORMAL
WEIGHT AFP: 235 LBS

## 2024-04-25 ENCOUNTER — HOSPITAL ENCOUNTER (OUTPATIENT)
Dept: PERINATAL CARE | Facility: HOSPITAL | Age: 29
Discharge: HOME | End: 2024-04-25
Attending: OBSTETRICS & GYNECOLOGY
Payer: COMMERCIAL

## 2024-04-25 VITALS
WEIGHT: 227 LBS | SYSTOLIC BLOOD PRESSURE: 128 MMHG | BODY MASS INDEX: 43 KG/M2 | DIASTOLIC BLOOD PRESSURE: 83 MMHG | HEART RATE: 78 BPM

## 2024-04-25 DIAGNOSIS — O99.212 MATERNAL MORBID OBESITY IN SECOND TRIMESTER, ANTEPARTUM (HCC): ICD-10-CM

## 2024-04-25 DIAGNOSIS — R73.03 PREDIABETES: Chronic | ICD-10-CM

## 2024-04-25 DIAGNOSIS — E66.01 MATERNAL MORBID OBESITY IN SECOND TRIMESTER, ANTEPARTUM (HCC): ICD-10-CM

## 2024-04-25 DIAGNOSIS — O99.210 OBESITY IN PREGNANCY (HCC): Primary | Chronic | ICD-10-CM

## 2024-04-25 DIAGNOSIS — O09.299 HX OF PREECLAMPSIA, PRIOR PREGNANCY, CURRENTLY PREGNANT (HCC): Chronic | ICD-10-CM

## 2024-04-25 DIAGNOSIS — Z36.89 ENCOUNTER FOR FETAL ANATOMIC SURVEY (HCC): ICD-10-CM

## 2024-04-25 DIAGNOSIS — O99.210 OBESITY IN PREGNANCY (HCC): Chronic | ICD-10-CM

## 2024-04-25 PROCEDURE — 76811 OB US DETAILED SNGL FETUS: CPT | Performed by: OBSTETRICS & GYNECOLOGY

## 2024-04-25 NOTE — PROGRESS NOTES
Outpatient Maternal-Fetal Medicine Consultation    Dear Dr. Goodman,    Thank you for requesting ultrasound evaluation and maternal fetal medicine consultation on your patient Mary Chatman.  As you are aware she is a 28 year old female with a Payne pregnancy at 20w0d.  A maternal-fetal medicine consultation was requested secondary to maternal morbid obesity and history of preeclampsia.  Her prenatal records and labs were reviewed.    Had a baseline protein creatinine ratio of ~0.14 (lab did not  calculate the ratio because of protein was less than 6 mg)    HISTORY  OB History    Para Term  AB Living   2 1 1 0 0 1   SAB IAB Ectopic Multiple Live Births   0 0 0 0 1     # 1 - Date: 19, Sex: Male, Weight: 8 lb 13.1 oz (4 kg), GA: 41w1d, Type: Caesarean Section, Apgar1: 8, Apgar5: 9, Living: Living, Birth Comments: Was on oxygen for TTN in [place]  Passed hearing test and cardiac screen  Received vitamin K, EES, hep B  No jaundice  Possible hypospadias    # 2 - Date: None, Sex: None, Weight: None, GA: None, Type: None, Apgar1: None, Apgar5: None, Living: None, Birth Comments: None    Past Medical History  The patient  has a past medical history of Asthma (HCC), Morbid obesity with BMI of 40.0-44.9, adult (HCC), PCOS (polycystic ovarian syndrome), Thyroid nodule, and Vitamin D deficiency.    Past Surgical History  The patient  has a past surgical history that includes tonsillectomy (); removal gallbladder (); dermatological procedure; ; and each add tooth extraction.    Family History  The patient She indicated that her mother is alive. She indicated that her father is alive. She indicated that both of her sisters are alive. She indicated that her brother is alive. She indicated that her maternal grandmother is alive. She indicated that her maternal grandfather is alive. She indicated that her paternal grandmother is alive. She indicated that her paternal grandfather is  alive. She indicated that her son is alive. She indicated that the status of her neg is unknown.      Medications:   Current Outpatient Medications:     aspirin (ASPIRIN 81) 81 MG Oral Chew Tab, Chew 1.5 tablets (121.5 mg total) by mouth daily., Disp: 180 tablet, Rfl: 3    prenatal vitamin with DHA 27-0.8-228 MG Oral Cap, Take 1 capsule by mouth daily., Disp: , Rfl:   Allergies: No Known Allergies      PHYSICAL EXAMINATION:  /83   Pulse 78   Wt 227 lb (103 kg)   LMP 11/17/2023 (Approximate)   BMI 42.89 kg/m²   General: alert and oriented,no acute distress  Abdomen: obese, gravid, soft, non-tender  Extremities: non-tender, no edema      OBSTETRIC ULTRASOUND  The patient had a level 2 ultrasound today which I interpreted the results and reviewed them with the patient.    Ultrasound Findings: Suboptimal view: limited by maternal body habitus and fetal position  Single IUP in breech presentation.    Placenta is posterior, high.   A 3 vessel cord is noted.  Cardiac activity is present at 153 bpm   g ( 0 lb 14 oz);   MVP is 4.5 cm .     Heart views limited: 4ch; 3VV- aorta looks smaller and Aortic Arch  view-angle vs Irregular in shape    The fetal measurements are consistent with the established EDC. No ultrasound evidence of structural abnormalities are seen today. The nasal bone is present. No ultrasound evidence of markers for aneuploidy are seen. She understands that ultrasound exam cannot exclude genetic abnormalities and that genetic testing is recommended. The limitations of ultrasound were discussed.     Uterus and adnexa appeared normal  today on US    See imaging tab for the complete US report.    DISCUSSION  During her visit we discussed and reviewed the following issues:  PRIOR PREECLAMPSIA  History:  The patient developed preeclampsia at 41 weeks.      Background  Preeclampsia refers to the new onset of hypertension and proteinuria after 20 weeks of gestation in a previously normotensive  woman.  Preeclampsia occurs in approximately 3 to 14 percent of all pregnancies worldwide, and about 5 to 8 percent in the [place].  The pathogenesis of preeclampsia is incompletely understood, but the disorder is clearly initiated by the presence of the trophoblast, and impaired placental angiogenesis plays an important role.   The clinical manifestations of preeclampsia can appear anytime from the second trimester to the first few weeks postpartum. The majority of cases of preeclampsia arise in low-risk nulliparous women without medical complications or identifiable risk factors.        Recurrence   A 2015 meta-analysis of individual patient data from over 75,000 women with preeclampsia who became pregnant again found that 16 percent developed recurrent preeclampsia and 20 percent developed hypertension alone in a subsequent pregnancy.  The recurrence risk varies with the severity and time of onset of the initial episode. Women with early-onset, severe preeclampsia are at greatest risk of recurrence (as high as 25 to 65 percent).  The risk of preeclampsia in a second pregnancy is much lower (5 to 7 percent) for women who had preeclampsia without severe features in their first pregnancy and less than 1 percent in women who had a normotensive first pregnancy (does not apply to abortions). These relationships were illustrated by a series of 125 women with severe second-trimester preeclampsia followed for five years: 65 percent developed recurrent preeclampsia and 35 percent were normotensive in their subsequent pregnancy. Of the preeclamptic group, approximately one-third developed the disease at ?27 weeks, one-third at 28 to 36 weeks, and one-third at ?37 weeks. Thus, 21 percent of subsequent pregnancies were complicated by severe preeclampsia in the second trimester.  Recurrent preeclampsia is more likely following a preeclamptic casas pregnancy than a preeclamptic twin pregnancy. A 2015 meta-analysis of  individual patient data from 512 women with HELLP who became pregnant again found that 7 percent developed HELLP in a subsequent pregnancy. In addition, 18 percent developed preeclampsia and 18 percent gestational hypertension.     Prediction  The ability to predict preeclampsia is currently of limited benefit because neither the development of the disorder nor its progression from mild to severe disease can be prevented in most patients, and there is no cure except delivery. Nevertheless, the accurate identification of women at risk, early diagnosis, and prompt and appropriate management will lead to an improvement in maternal, and possibly , outcome.            Risk factors for preeclampsia include: Nulliparity,  Preeclampsia in a previous pregnancy,  Age >40 years or <18 years,  Family history of pregnancy-induced hypertension, Chronic hypertension,  Chronic renal disease,  Antiphospholipid antibody syndrome or inherited thrombophilia, Vascular or connective tissue disease, Diabetes mellitus (pregestational and gestational),  Multifetal gestation, High body mass index,  Male partner whose previous partner had preeclampsia, Hydrops fetalis, and Unexplained fetal growth restriction.             The weight of evidence indicates that inherited thrombophilias (such as Factor V Leiden mutation, prothrombin gene mutation, protein C or S deficiency, antithrombin III deficiency) are not associated with preeclampsia. Therefore, screening pregnant women for inherited thrombophilias is not useful for predicting those at high risk of developing preeclampsia. Measurement of angiogenic factors (VEGF, PlGF, sFlt-1, Hailey) in blood or urine is the most promising approach for predicting preeclampsia; however, these tests are investigational and are not available for clinical use at present.            Long-term Health Risks  Data from multiple observational studies suggest that preeclampsia predicts remote cardiovascular  events (eg, hypertension, ischemic heart disease, stroke). Review of all of a woman's pregnancies is necessary to define her long-term risk accurately. Those with early onset severe preeclampsia, recurrent preeclampsia, gestational hypertension, or preeclampsia with onset as a multipara appear to be at highest risk of cardiovascular disease later in life, including during the premenopausal period.   In contrast, preeclampsia/eclampsia occurring late in gestation in primigravid women and followed by a normotensive pregnancy does not appear to be associated with increased remote cardiovascular risk.      Prevention Of Recurrence  Many studies have been performed to try to find a way to prevent preeclampsia.  These include the use of fish oil, vitamin C, Vitamin E, calcium and aspirin.  Vitamin supplements have not been proven to be effective as preventing preeclampsia however low-dose aspirin therapy during pregnancy modestly reduces the risk of preeclampsia in women at high risk for developing the disease. Anticoagulation does not prevent recurrence.  There is no evidence that any therapy prevents recurrent HELLP syndrome, but data are limited.     Antiplatelet Agents  The observation that preeclampsia is associated with increased platelet turnover and increased platelet-derived thromboxane levels led to randomized trials evaluating low-dose aspirin therapy in women thought to be at increased risk of the disease. As opposed to higher dose aspirin therapy, low-dose aspirin (60 to 150 mg per day) diminishes platelet thromboxane synthesis while maintaining vascular wall prostacyclin synthesis. Although not well studied, the beneficial effect of low-dose aspirin for prevention of preeclampsia may also be in part related to modulation of inflammation. The drug has been studied for both prevention of preeclampsia and prevention of progression from mild to severe disease. It appears to result in a modest reduction in risk  of preeclampsia when given to women at moderate to high risk of preeclampsia.  This approach has been studied in over 35,000 women, for both prevention of preeclampsia and prevention of progression of the disease. Low dose aspirin has a modest impact on pregnancy outcome; it reduces the risk of preeclampsia, as well as other adverse pregnancy outcomes (eg,  delivery, fetal growth restriction) by about 10 to 20 percent. Low dose aspirin is safe in pregnancy; thus, it is a reasonable strategy in women with a moderate to high risk of preeclampsia in whom the benefits outweigh the risks.     Clinical Guidelines  Based on the available data, low-dose aspirin is advised for women at high risk for preeclampsia. There is no consensus on the criteria that confer high risk. The [place] Preventive Services Task Force (USPSTF) risk criteria are reasonable.  USPSTF criteria for high risk include one or more of the following:   Previous pregnancy with preeclampsia, especially early onset and with an adverse outcome   Multifetal gestation  Chronic hypertension   Type 1 or 2 diabetes mellitus  Chronic kidney disease  Autoimmune disease (antiphospholipid syndrome, systemic lupus erythematosus)     Women with multiple moderate risk factors for preeclampsia are considered high risk, as well. Identification of women with an appropriate combination of moderate risk factors to be considered high risk is subjective and determined case by case, as the data describing the magnitude of the association between each of these risk factors and development of preeclampsia vary widely and lack consistency. USPSTF criteria for moderate risk include:  Nulliparity  Obesity (body mass index >30 kg/m2)  Family history of preeclampsia in mother or sister  Age ?35 years  Sociodemographic characteristics (, low socioeconomic level)  Personal risk factors (eg, history of low birth weight or small for gestational age, previous  adverse pregnancy outcome, >10 year pregnancy interval)     If used, daily low-dose aspirin should be initiated in the 12th or 13th week of gestation, although adverse effects from earlier initiation (eg, preconception) have not been documented. The optimum low dose is unclear; 81 mg is readily available, but 100 or 150 mg (which are not available in some countries including the [place] may be more effective.  In some pregnant women, platelet function is not inhibited by the 81 mg dose but is altered by higher dosing. Hence, current evidence has suggested a daily dose of 100 to 150 mg of aspirin rather than a lower dose. In the [place], this can be achieved by taking one and one-half 81 mg tablets; however, taking one 81 mg tablet is also reasonable since this is the commercially available dose and has proven efficacy. For prevention of preeclampsia, no trials have evaluated the efficacy of a higher dose of aspirin, which could be achieved easily by taking two 81 mg tablets or splitting a 325 mg tablet. For prevention of myocardial infarction and stroke in nonpregnant individuals, aspirin appears to be equally effective at doses between 75 and 325 mg per day.  The safety of low-dose aspirin use in the second and third trimesters is well established, but questions linger regarding use in the first trimester (possible increase in minor vaginal bleeding or gastroschisis). Early therapy (before 16 weeks) may be important since the pathophysiologic features of preeclampsia develop at this time, weeks before clinical disease is apparent. However, available evidence is inconsistent about the importance of early initiation of therapy, possibly because aspirin has major effects on prostacyclin production and endothelial function throughout gestation.  Aspirin is discontinued 5 to 10 days before expected delivery to diminish the risk of bleeding during delivery; however, no adverse maternal or fetal effects related to  low-dose aspirin have been proven.  Major questions remain as to which, if any, subgroups of women are more likely to benefit from low-dose aspirin therapy, when treatment should be started (first or second trimester), and the optimum dose to inhibit placental PGH synthase (cyclooxygenase) and also allow the anti-inflammatory effects of low-dose aspirin.     We reviewed her risk factors for preeclampsia which include maternal obesity and a history of preeclampsia.  I agree with her OB provider that she should be on low-dose aspirin.  She can continue taking 1.5 tablets daily or increase to 2 tablets daily.    OBESITY:  Her BMI prior to pregnancy was 42.5  Obesity during pregnancy is associated with numerous maternal and  risks.  It is not clear whether obesity is a direct cause of adverse pregnancy outcome or whether the association between obesity and adverse pregnancy outcome is due to factors such as diabetes mellitus.   Data suggest that obese women should be encouraged to undertake a weight reduction program (diet, exercise, behavior modification, and possibly bariatric surgery in some cases) prior to attempting to conceive.            Subfertility in obese women is most commonly related to ovulatory dysfunction, and, in some obese women, the ovulatory dysfunction is related to polycystic ovary syndrome (PCOS). It is also important to note that even among ovulatory women, increasing obesity is associated with decreasing spontaneous pregnancy rates.  The increased risk of miscarriage in obese women may be because such women often have PCOS or isolated insulin resistance.                 Due to its strong association with obesity in the general population, type 2 diabetes mellitus is one of the two most common medical complications of the obese . The increased risk of type 2 diabetes is primarily related to an exaggerated increase in insulin resistance in the obese state. It is reasonable to  screen obese gravidas for undiagnosed pregestational diabetes in the first trimester.   Glucose intolerance associated with gestational diabetes generally resolves postpartum; however, obese women with a history of gestational diabetes have a two-fold increased prevalence of subsequent type 2 diabetes.           An association between obesity and hypertensive disorders during pregnancy has been consistently reported.  In particular, maternal weight and BMI are independent risk factors for preeclampsia.             Studies have found that the increased risk of  birth in obese gravidas is primarily associated with obesity-related medical and  complications, rather than an intrinsic predisposition to spontaneous  birth. Prevention of  birth in these patients, therefore, should be directed toward prevention or management of medical and obstetrical complications.               Both prepregnancy obesity and excessive maternal weight gain before or during pregnancy contribute to an increased probability of  delivery.  It has also been hypothesized that obesity may lead to dystocia due to increased soft tissue deposition in the maternal pelvis.    delivery in the obese  is associated with numerous perioperative concerns, including emergency delivery, prolonged incision to delivery interval, blood loss >1000 mL, longer operative times, wound infection, thromboembolism, and endometritis.            Maternal obesity appears to be associated with a small increase in the absolute rate of some congenital anomalies (primarily neural tube defect and cardiac), and the risk may increase with increasing maternal weight.  Level II ultrasound is advised for women with obesity.  The risk of neural tube defects increased significantly with maternal weight.    The analysis found that overweight and obese pregnant women experienced significantly more stillbirths than normal weight women.   Third trimester  testing is advised.    We discussed the current recommendations for limited gestational weight gain in pregnancy for overweight and obese women.  The [employer] currently recommends that women keep gestational weight gain to between 8-18 lbs.  We discussed the role of mild to moderate exercise, healthy food choices and appropriate portions sized to help achieve this goal.  Excess weight gain is associated with higher rates of gestational diabetes, hypertensive complications, fetal macrosomia and delivery complications.  Women with weight loss or insufficient weight gain have higher rates of small for gestational age infants.    A recent study found that initiating moderate exercise in early pregnancy for obese gravidas significantly reduced the incidence of gestational diabetes, gestational hypertension,  deliveries and C-sections.  In the study, women were assigned to riding a stationary cycle for  30 minutes 3 times per week, keeping HR<140 bpm.  I encouraged Mary to begin moderate exercise such as walking or stationary bike in the pregnancy.     We discussed the recommended plan of care based on her  risk factors.  Mary and her significant other, Mamadou, had their questions answered to their satisfaction.      IMPRESSION:  IUP at 20w0d  Ultrasound is consistent with dates  Normal-appearing fetal anatomy but several cardiac views were limited due to maternal habitus and fetal position  Maternal morbid obesity  History of term preeclampsia    RECOMMENDATIONS:  Continue care with Dr. Gabriel  Early 1 hr gtt  Limited gestational weight gain to less than 20 pounds  Monthly growth ultrasounds starting at 28 weeks, add BPP to each growth ultrasound at 32 weeks and beyond  Weekly NSTs at 34 weeks  Delivery 39-40 weeks  Daily low-dose aspirin (1.5-2 tablets daily)  Follow-up in 3 to 4 weeks for fetal echocardiogram    Total time spent 55 minutes this  calendar day which includes preparing to see the patient including chart review, obtaining and/or reviewing additional medical history, performing a physical exam and evaluation, documenting clinical information in the electronic medical record, independently interpreting results, counseling the patient, communicating results to the patient/family/caregiver and coordinating care.     Case discussed with patient who demonstrated understanding and agreement with plan.     Thank you for allowing me to participate in the care of this patient.  Please feel free to contact me with any questions.    Anay Peacock MD  Maternal-Fetal Medicine       Note to patient and family:  The 21st Century Cures Act makes medical notes available to patients in the interest of transparency.  However, please be advised that this is a medical document.  It is intended as a peer to peer communication.  It is written in medical language and may contain abbreviations or verbiage that are technical and unfamiliar.  It may appear blunt or direct.  Medical documents are intended to carry relevant information, facts as evident, and the clinical opinion of the practitioner.

## 2024-04-29 ENCOUNTER — ROUTINE PRENATAL (OUTPATIENT)
Dept: OBGYN CLINIC | Facility: CLINIC | Age: 29
End: 2024-04-29
Payer: COMMERCIAL

## 2024-04-29 VITALS
SYSTOLIC BLOOD PRESSURE: 138 MMHG | HEIGHT: 61 IN | DIASTOLIC BLOOD PRESSURE: 84 MMHG | WEIGHT: 229.38 LBS | BODY MASS INDEX: 43.3 KG/M2

## 2024-04-29 DIAGNOSIS — Z36.9 UNSPECIFIED ANTENATAL SCREENING (HCC): Primary | ICD-10-CM

## 2024-04-29 NOTE — PROGRESS NOTES
Doing well. No OB complaints. +FM.   Reviewed normal level 2 ultrasound with limited cardiac views. Agreed with Fetal ECHO.   Ordered 1h GTT and CBC. Patient to do with next visit.     NATTY 4 weeks.     Dr. Georges Blue MD    [facility]     This note was created by Dragon voice recognition. Errors in content may be related to improper recognition by the system; efforts to review and correct have been done but errors may still exist. Please be advised the primary purpose of this note is for me to communicate medical care. Standard sentence structure is not always used. Medical terminology and medical abbreviations may be used. There may be grammatical, typographical, and automated fill ins that may have errors missed in proofreading.

## 2024-05-03 ENCOUNTER — HOSPITAL ENCOUNTER (EMERGENCY)
Facility: HOSPITAL | Age: 29
Discharge: HOME | End: 2024-05-04
Attending: EMERGENCY MEDICINE
Payer: COMMERCIAL

## 2024-05-03 ENCOUNTER — APPOINTMENT (OUTPATIENT)
Dept: ULTRASOUND IMAGING | Facility: HOSPITAL | Age: 29
End: 2024-05-03
Attending: EMERGENCY MEDICINE
Payer: COMMERCIAL

## 2024-05-03 ENCOUNTER — TELEPHONE (OUTPATIENT)
Dept: OBGYN CLINIC | Facility: CLINIC | Age: 29
End: 2024-05-03

## 2024-05-03 ENCOUNTER — OFFICE VISIT (OUTPATIENT)
Dept: FAMILY MEDICINE CLINIC | Facility: CLINIC | Age: 29
End: 2024-05-03
Payer: COMMERCIAL

## 2024-05-03 VITALS
SYSTOLIC BLOOD PRESSURE: 132 MMHG | HEART RATE: 85 BPM | DIASTOLIC BLOOD PRESSURE: 76 MMHG | HEIGHT: 61 IN | WEIGHT: 229 LBS | RESPIRATION RATE: 20 BRPM | TEMPERATURE: 99 F | BODY MASS INDEX: 43.23 KG/M2 | OXYGEN SATURATION: 98 %

## 2024-05-03 DIAGNOSIS — R07.9 CHEST PAIN OF UNCERTAIN ETIOLOGY: Primary | ICD-10-CM

## 2024-05-03 DIAGNOSIS — R06.02 SHORTNESS OF BREATH: Primary | ICD-10-CM

## 2024-05-03 LAB
ALBUMIN SERPL-MCNC: 4.3 G/DL (ref 3.2–4.8)
ALP LIVER SERPL-CCNC: 63 U/L
ALT SERPL-CCNC: 18 U/L
ANION GAP SERPL CALC-SCNC: 8 MMOL/L (ref 0–18)
AST SERPL-CCNC: 24 U/L (ref ?–34)
BASOPHILS # BLD AUTO: 0.07 X10(3) UL (ref 0–0.2)
BASOPHILS NFR BLD AUTO: 0.7 %
BILIRUB DIRECT SERPL-MCNC: 0.1 MG/DL (ref ?–0.3)
BILIRUB SERPL-MCNC: 0.5 MG/DL (ref 0.3–1.2)
BILIRUB UR QL: NEGATIVE
BNP SERPL-MCNC: 10 PG/ML
BUN BLD-MCNC: <5 MG/DL (ref 9–23)
CALCIUM BLD-MCNC: 9 MG/DL (ref 8.7–10.4)
CHLORIDE SERPL-SCNC: 107 MMOL/L (ref 98–112)
CLARITY UR: CLEAR
CO2 SERPL-SCNC: 24 MMOL/L (ref 21–32)
COLOR UR: COLORLESS
CREAT BLD-MCNC: 0.5 MG/DL
D DIMER PPP FEU-MCNC: 0.59 UG/ML FEU (ref ?–0.5)
DEPRECATED RDW RBC AUTO: 42.7 FL (ref 35.1–46.3)
EGFRCR SERPLBLD CKD-EPI 2021: 131 ML/MIN/1.73M2 (ref 60–?)
EOSINOPHIL # BLD AUTO: 0.19 X10(3) UL (ref 0–0.7)
EOSINOPHIL NFR BLD AUTO: 1.9 %
ERYTHROCYTE [DISTWIDTH] IN BLOOD BY AUTOMATED COUNT: 12.6 % (ref 11–15)
GLUCOSE BLD-MCNC: 78 MG/DL (ref 70–99)
GLUCOSE UR-MCNC: NORMAL MG/DL
HCT VFR BLD AUTO: 37.1 %
HGB BLD-MCNC: 12.7 G/DL
HGB UR QL STRIP.AUTO: NEGATIVE
IMM GRANULOCYTES # BLD AUTO: 0.03 X10(3) UL (ref 0–1)
IMM GRANULOCYTES NFR BLD: 0.3 %
KETONES UR-MCNC: NEGATIVE MG/DL
LEUKOCYTE ESTERASE UR QL STRIP.AUTO: 25
LYMPHOCYTES # BLD AUTO: 1.64 X10(3) UL (ref 1–4)
LYMPHOCYTES NFR BLD AUTO: 16.5 %
MCH RBC QN AUTO: 31.6 PG (ref 26–34)
MCHC RBC AUTO-ENTMCNC: 34.2 G/DL (ref 31–37)
MCV RBC AUTO: 92.3 FL
MONOCYTES # BLD AUTO: 0.63 X10(3) UL (ref 0.1–1)
MONOCYTES NFR BLD AUTO: 6.3 %
NEUTROPHILS # BLD AUTO: 7.37 X10 (3) UL (ref 1.5–7.7)
NEUTROPHILS # BLD AUTO: 7.37 X10(3) UL (ref 1.5–7.7)
NEUTROPHILS NFR BLD AUTO: 74.3 %
NITRITE UR QL STRIP.AUTO: NEGATIVE
PH UR: 7 [PH] (ref 5–8)
PLATELET # BLD AUTO: 217 10(3)UL (ref 150–450)
POTASSIUM SERPL-SCNC: 3.9 MMOL/L (ref 3.5–5.1)
PROT SERPL-MCNC: 7.8 G/DL (ref 5.7–8.2)
PROT UR-MCNC: NEGATIVE MG/DL
RBC # BLD AUTO: 4.02 X10(6)UL
SODIUM SERPL-SCNC: 139 MMOL/L (ref 136–145)
SP GR UR STRIP: <1.005 (ref 1–1.03)
TROPONIN I SERPL HS-MCNC: 14 NG/L
UROBILINOGEN UR STRIP-ACNC: NORMAL
WBC # BLD AUTO: 9.9 X10(3) UL (ref 4–11)

## 2024-05-03 PROCEDURE — 93005 ELECTROCARDIOGRAM TRACING: CPT

## 2024-05-03 PROCEDURE — 93010 ELECTROCARDIOGRAM REPORT: CPT

## 2024-05-03 PROCEDURE — 3075F SYST BP GE 130 - 139MM HG: CPT | Performed by: NURSE PRACTITIONER

## 2024-05-03 PROCEDURE — 36415 COLL VENOUS BLD VENIPUNCTURE: CPT

## 2024-05-03 PROCEDURE — 80076 HEPATIC FUNCTION PANEL: CPT | Performed by: EMERGENCY MEDICINE

## 2024-05-03 PROCEDURE — 84484 ASSAY OF TROPONIN QUANT: CPT | Performed by: EMERGENCY MEDICINE

## 2024-05-03 PROCEDURE — 87086 URINE CULTURE/COLONY COUNT: CPT | Performed by: EMERGENCY MEDICINE

## 2024-05-03 PROCEDURE — 3008F BODY MASS INDEX DOCD: CPT | Performed by: NURSE PRACTITIONER

## 2024-05-03 PROCEDURE — 85379 FIBRIN DEGRADATION QUANT: CPT | Performed by: EMERGENCY MEDICINE

## 2024-05-03 PROCEDURE — 80048 BASIC METABOLIC PNL TOTAL CA: CPT | Performed by: EMERGENCY MEDICINE

## 2024-05-03 PROCEDURE — 99284 EMERGENCY DEPT VISIT MOD MDM: CPT

## 2024-05-03 PROCEDURE — 99285 EMERGENCY DEPT VISIT HI MDM: CPT

## 2024-05-03 PROCEDURE — 83880 ASSAY OF NATRIURETIC PEPTIDE: CPT | Performed by: EMERGENCY MEDICINE

## 2024-05-03 PROCEDURE — 99215 OFFICE O/P EST HI 40 MIN: CPT | Performed by: NURSE PRACTITIONER

## 2024-05-03 PROCEDURE — 3078F DIAST BP <80 MM HG: CPT | Performed by: NURSE PRACTITIONER

## 2024-05-03 PROCEDURE — 93970 EXTREMITY STUDY: CPT | Performed by: EMERGENCY MEDICINE

## 2024-05-03 PROCEDURE — 81001 URINALYSIS AUTO W/SCOPE: CPT | Performed by: EMERGENCY MEDICINE

## 2024-05-03 PROCEDURE — 85025 COMPLETE CBC W/AUTO DIFF WBC: CPT | Performed by: EMERGENCY MEDICINE

## 2024-05-03 NOTE — PROGRESS NOTES
Patient, Mary Chatman , is a 28 year old female who presented today in clinic with shortness of breath, anxiety, and fullness feeling in her abdomen (feels possible constipation) and this started yesterday. Some nausea with pregnancy. She wanted to have her blood pressure examined that her symptoms have have stemmed from an elevated in this number.     Pt cannot point to any trigger for anxiety. Pt denies hx of anxiety.     Pt is currently 21 weeks pregnant. Feeling baby move +    Hx of preeclampsia.     No vaginal bleeding reported.     Vitals:    05/03/24 1212   BP: 132/76   BP Location: Left arm   Patient Position: Sitting   Cuff Size: large   Pulse: 85   Resp: 20   Temp: 98.8 °F (37.1 °C)   TempSrc: Oral   SpO2: 98%   Weight: 229 lb (103.9 kg)   Height: 5' 1\" (1.549 m)         No results found for this or any previous visit (from the past 168 hour(s)).    Exam:    GENERAL: appears well, well nourished, in no apparent distress  SKIN: no rashes,no suspicious lesions  HEAD: atraumatic, normocephalic.    EYES: conjunctiva clear  NECK: Supple, non-tender  LUNGS: clear to auscultation bilaterally, no wheezes or rhonchi. Breathing is non labored. Speaking in full sentences without hesitation  CARDIO: RRR without murmur  GI: ABD soft and non distended. Active BS's x4,no masses, hepatosplenomegaly, or tenderness on direct palpation X 4 quadrants  EXTREMITIES: no cyanosis, clubbing or edema  LYMPH:  No lymphadenopathy.    NEURO: No focal deficits   Accompanied by: self  After triage and detailed discussion with patient/familiy, it was determined that they would benefit from a higher acuity or more comprehensive level of care.  I informed them of the scope of practice of the [facility] and advised the be further evaluated at [facility] due to concern of shortness of breath and abdominal fullness. Pt is pregnant. Pt stated will contact her OB/GYN. Discussed with patient my recommendation is to  seek emergency care  Patient/parent /family verbalized understanding of rationale for further evaluation and was stable upon departure from the [facility].

## 2024-05-03 NOTE — ED PROVIDER NOTES
Patient Seen in: [facility] Emergency Department      History     Chief Complaint   Patient presents with    Chest Pain Angina     Stated Complaint: pregnancy issues, chest pain    Subjective:   28-year-old female G2, P1 at 21 weeks pregnant presents for evaluation feeling anxious and shortness of breath for about 7 hours.  She also has some discomfort in her left upper chest which does not seem to be altered by anything.  It is less intense than it was earlier today at work.  No nausea or diaphoresis.  No cough or congestion.  No fever.  No urinary symptoms but she does feel constipated and the urge to have a bowel movement at times.  No trauma or travel.  No unilateral leg swelling.  No history of DVT.  Patient states she is feeling the baby move.  No rash.  No headaches.  Takes prenatals and aspirin because she had preeclampsia.            Objective:   Past Medical History:    Asthma (HCC)    Morbid obesity with BMI of 40.0-44.9, adult (HCC)    PCOS (polycystic ovarian syndrome)    Thyroid nodule    Vitamin D deficiency              Past Surgical History:   Procedure Laterality Date          Dermatological procedure      Facial abscess removed    Each add tooth extraction      no associated bleeding complications    Removal gallbladder  2010    Tonsillectomy                  Social History     Socioeconomic History    Marital status:    Tobacco Use    Smoking status: Never    Smokeless tobacco: Never   Vaping Use    Vaping status: Never Used   Substance and Sexual Activity    Alcohol use: No     Alcohol/week: 0.0 standard drinks of alcohol    Drug use: No    Sexual activity: Yes     Partners: Male     Birth control/protection: Condom   Other Topics Concern    Caffeine Concern Yes     Comment: 1 cup soda/coffee per day   Social History Narrative    Mary is  x 3 yrs. Mother of 1 son. 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 Mary and her family live in [place], [place].     Social  Determinants of Health     Financial Resource Strain: Low Risk  (1/20/2024)    Financial Resource Strain     Difficulty of Paying Living Expenses: Not hard at all     Med Affordability: No   Food Insecurity: No Food Insecurity (1/20/2024)    Food Insecurity     Food Insecurity: Never true   Transportation Needs: No Transportation Needs (1/20/2024)    Transportation Needs     Lack of Transportation: No   Stress: No Stress Concern Present (1/20/2024)    Stress     Feeling of Stress : No   Housing Stability: Low Risk  (1/20/2024)    Housing Stability     Housing Instability: No              Review of Systems    Positive for stated complaint: pregnancy issues, chest pain  Other systems are as noted in HPI.  Constitutional and vital signs reviewed.      All other systems reviewed and negative except as noted above.    Physical Exam     ED Triage Vitals [05/03/24 1616]   /78   Pulse 87   Resp 18   Temp 98.5 °F (36.9 °C)   Temp src Temporal   SpO2 98 %   O2 Device None (Room air)       Current:/72   Pulse 76   Temp 98.5 °F (36.9 °C) (Temporal)   Resp 20   LMP 11/17/2023 (Approximate)   SpO2 98%         Physical Exam  HENT:      Head: Normocephalic.      Mouth/Throat:      Mouth: Mucous membranes are moist.      Pharynx: Oropharynx is clear.   Eyes:      Extraocular Movements: Extraocular movements intact.      Pupils: Pupils are equal, round, and reactive to light.   Cardiovascular:      Rate and Rhythm: Normal rate and regular rhythm.      Heart sounds: Normal heart sounds.   Pulmonary:      Effort: Pulmonary effort is normal.      Breath sounds: Normal breath sounds.   Chest:      Chest wall: No tenderness.   Abdominal:      Palpations: Abdomen is soft.      Tenderness: There is no abdominal tenderness.   Musculoskeletal:         General: No swelling or tenderness. Normal range of motion.      Cervical back: Normal range of motion.      Right lower leg: No edema.      Left lower leg: No edema.    Lymphadenopathy:      Cervical: No cervical adenopathy.   Skin:     General: Skin is warm.      Capillary Refill: Capillary refill takes less than 2 seconds.   Neurological:      General: No focal deficit present.      Mental Status: She is alert.               ED Course     Labs Reviewed   BASIC METABOLIC PANEL (8) - Abnormal; Notable for the following components:       Result Value    BUN <5 (*)     Creatinine 0.50 (*)     All other components within normal limits   URINALYSIS WITH CULTURE REFLEX - Abnormal; Notable for the following components:    Urine Color Colorless (*)     Spec Gravity <1.005 (*)     Leukocyte Esterase Urine 25 (*)     Bacteria Urine 1+ (*)     Squamous Epi. Cells Few (*)     All other components within normal limits   D-DIMER - Abnormal; Notable for the following components:    D-Dimer 0.59 (*)     All other components within normal limits   HEPATIC FUNCTION PANEL (7) - Normal   TROPONIN I HIGH SENSITIVITY - Normal   BNP (B TYPE NATRIURETIC PEPTIDE) - Normal   CBC WITH DIFFERENTIAL WITH PLATELET    Narrative:     The following orders were created for panel order CBC With Differential With Platelet.  Procedure                               Abnormality         Status                     ---------                               -----------         ------                     CBC W/ DIFFERENTIAL[[id number]]                              Final result                 Please view results for these tests on the individual orders.   URINE CULTURE, ROUTINE   CBC W/ DIFFERENTIAL     EKG    Rate, intervals and axes as noted on EKG Report.  Rate: 85  Rhythm: Sinus Rhythm  Reading: Normal sinus rhythm and rate.  Normal axis and intervals.  Normal ST segments.  This EKG was interpreted by me.              ED Course as of 05/06/24 1322  ------------------------------------------------------------  Time: 05/03 2023  Comment: Labs and urine independently interpreted by me.  CBC and CMP unremarkable.  Troponin  normal, BNP normal, UA does not suggest infection.  D-dimer is above the normal limits.  Will once again discussed with the patient risks and benefits of doing CT chest.  She understands.  She is agreeable  ------------------------------------------------------------  Time: 05/03 2024  Comment: FHTs 165  ------------------------------------------------------------  Time: 05/03 2121  Comment: Patient is refusing CAT scan and chest x-ray now.  Will at least do Dopplers but she understands we could miss life-threatening condition.  ------------------------------------------------------------  Time: 05/03 2356  Comment: Bilateral Lower extrem DVT US  No priors    IMPRESSION:    No DVT.  No Baker cyst.                [facility]      US VENOUS DOPPLER LEG BILAT - DIAG IMG (CPT=93970)    Result Date: 5/4/2024  CONCLUSION: No DVT  [facility] provided a prelim report for this exam. This final report has no significant discrepancies with the [facility] report. .    Dictated by (CST): Kyree Banuelos MD on 5/04/2024 at 7:27 AM     Finalized by (CST): Kyree Banuelos MD on 5/04/2024 at 7:28 AM                                           Medical Decision Making  Patient here with left upper chest discomfort, shortness of breath and constipation and anxiety.  She is 21 weeks pregnant so need to consider PE but could have ACS, chest wall pain, pleurisy, infectious process and many other possibilities.  Her vitals are unremarkable with normal heart rate and normal EKG.  Pulse ox normal 98% on room air.  She has no vaginal bleeding or discharge and no abdominal tenderness.  Will do D-dimer labs and UA and discuss further for workup.  Initial blood pressure adequate    Amount and/or Complexity of Data Reviewed  External Data Reviewed: notes.     Details: I reviewed OB notes  Labs: ordered. Decision-making details documented in ED Course.     Details: Troponin normal  Radiology: ordered and independent interpretation performed. Decision-making  details documented in ED Course.  ECG/medicine tests: ordered and independent interpretation performed. Decision-making details documented in ED Course.  Discussion of management or test interpretation with external provider(s): Patient elected to refuse CT chest and chest x-ray.  Agreeable to Dopplers which were performed.  Fetal heart tones adequate.  Patient will follow-up with primary and OB or return to the ER for changes or worsening.  Good anticipatory guidance given.  She was alert and oriented and decisional.  Blood pressure was not concerning.  Pulse ox normal 98%.    Risk  OTC drugs.        Disposition and Plan     Clinical Impression:  1. Chest pain of uncertain etiology         Disposition:  Discharge  5/4/2024 12:00 am    Follow-up:  John Polanco MD  [address] [place] [address] [id number] [phone]    Follow up            Medications Prescribed:  Discharge Medication List as of 5/4/2024 12:20 AM

## 2024-05-03 NOTE — TELEPHONE ENCOUNTER
Pt called c/o anxiety, deep breathing and C/o feeling constipation, no thoughts of suicide or homicide. Pt concerned due to hx of pre eclampsia, currently at 21 weeks, BP at IC today 132/76. Was told by APN to go to ED for further evaluation and called this office.  Due to pt's c/o informed of anxiety and attempted to schedule with Arian Jo but pt states issue is related to heavy food that she ate yesterday.  Pt asked if needs to go to ED, informed yes for further evaluation.

## 2024-05-03 NOTE — ED INITIAL ASSESSMENT (HPI)
Pt c/o of chest pain that started today at work. Pt is 21 weeks pregnant  Denies abdominal pain. No vaginal bleeding. No SOB

## 2024-05-04 VITALS
SYSTOLIC BLOOD PRESSURE: 108 MMHG | TEMPERATURE: 99 F | OXYGEN SATURATION: 98 % | DIASTOLIC BLOOD PRESSURE: 72 MMHG | RESPIRATION RATE: 20 BRPM | HEART RATE: 76 BPM

## 2024-05-04 LAB
ATRIAL RATE: 85 BPM
P AXIS: 16 DEGREES
P-R INTERVAL: 136 MS
Q-T INTERVAL: 368 MS
QRS DURATION: 82 MS
QTC CALCULATION (BEZET): 437 MS
R AXIS: 14 DEGREES
T AXIS: 12 DEGREES
VENTRICULAR RATE: 85 BPM

## 2024-05-04 NOTE — DISCHARGE INSTRUCTIONS
The cause of your chest pain is not known at this time.  A CAT scan was recommended.  If you change your mind or get worsening symptoms return to the ER or call 911.  Tylenol for pain.  Follow-up with your doctor and OB as soon as possible.  Read instructions for all recommendations.

## 2024-05-08 ENCOUNTER — PATIENT OUTREACH (OUTPATIENT)
Dept: CASE MANAGEMENT | Age: 29
End: 2024-05-08

## 2024-05-23 ENCOUNTER — LAB ENCOUNTER (OUTPATIENT)
Dept: LAB | Age: 29
End: 2024-05-23
Attending: OBSTETRICS & GYNECOLOGY

## 2024-05-23 ENCOUNTER — PATIENT MESSAGE (OUTPATIENT)
Dept: OBGYN CLINIC | Facility: CLINIC | Age: 29
End: 2024-05-23

## 2024-05-23 ENCOUNTER — PATIENT MESSAGE (OUTPATIENT)
Dept: ENDOCRINOLOGY CLINIC | Facility: CLINIC | Age: 29
End: 2024-05-23

## 2024-05-23 DIAGNOSIS — O99.280 THYROID DISEASE AFFECTING PREGNANCY (HCC): Primary | ICD-10-CM

## 2024-05-23 DIAGNOSIS — Z36.9 UNSPECIFIED ANTENATAL SCREENING (HCC): ICD-10-CM

## 2024-05-23 DIAGNOSIS — O99.280 HYPOTHYROIDISM AFFECTING PREGNANCY, ANTEPARTUM (HCC): ICD-10-CM

## 2024-05-23 DIAGNOSIS — R73.09 ELEVATED GLUCOSE TOLERANCE TEST: Primary | ICD-10-CM

## 2024-05-23 DIAGNOSIS — E07.9 THYROID DISEASE AFFECTING PREGNANCY (HCC): Primary | ICD-10-CM

## 2024-05-23 DIAGNOSIS — E03.9 HYPOTHYROIDISM AFFECTING PREGNANCY, ANTEPARTUM (HCC): ICD-10-CM

## 2024-05-23 LAB
BASOPHILS # BLD AUTO: 0.05 X10(3) UL (ref 0–0.2)
BASOPHILS NFR BLD AUTO: 0.6 %
DEPRECATED RDW RBC AUTO: 42.5 FL (ref 35.1–46.3)
EOSINOPHIL # BLD AUTO: 0.26 X10(3) UL (ref 0–0.7)
EOSINOPHIL NFR BLD AUTO: 3.2 %
ERYTHROCYTE [DISTWIDTH] IN BLOOD BY AUTOMATED COUNT: 12.5 % (ref 11–15)
GLUCOSE 1H P GLC SERPL-MCNC: 137 MG/DL
HCT VFR BLD AUTO: 37.2 %
HGB BLD-MCNC: 12.3 G/DL
IMM GRANULOCYTES # BLD AUTO: 0.02 X10(3) UL (ref 0–1)
IMM GRANULOCYTES NFR BLD: 0.2 %
LYMPHOCYTES # BLD AUTO: 1.47 X10(3) UL (ref 1–4)
LYMPHOCYTES NFR BLD AUTO: 18.2 %
MCH RBC QN AUTO: 30.7 PG (ref 26–34)
MCHC RBC AUTO-ENTMCNC: 33.1 G/DL (ref 31–37)
MCV RBC AUTO: 92.8 FL
MONOCYTES # BLD AUTO: 0.44 X10(3) UL (ref 0.1–1)
MONOCYTES NFR BLD AUTO: 5.4 %
NEUTROPHILS # BLD AUTO: 5.85 X10 (3) UL (ref 1.5–7.7)
NEUTROPHILS # BLD AUTO: 5.85 X10(3) UL (ref 1.5–7.7)
NEUTROPHILS NFR BLD AUTO: 72.4 %
PLATELET # BLD AUTO: 205 10(3)UL (ref 150–450)
RBC # BLD AUTO: 4.01 X10(6)UL
TSI SER-ACNC: 1.66 MIU/ML (ref 0.55–4.78)
WBC # BLD AUTO: 8.1 X10(3) UL (ref 4–11)

## 2024-05-23 PROCEDURE — 85025 COMPLETE CBC W/AUTO DIFF WBC: CPT

## 2024-05-23 PROCEDURE — 84443 ASSAY THYROID STIM HORMONE: CPT

## 2024-05-23 PROCEDURE — 36415 COLL VENOUS BLD VENIPUNCTURE: CPT

## 2024-05-23 PROCEDURE — 82950 GLUCOSE TEST: CPT

## 2024-05-24 NOTE — PROGRESS NOTES
Pt sent my chart message and provided instructions.    Test results viewed by patient via my chart.    Seen by patient Mary Chatman on 5/23/2024  9:31 PM

## 2024-05-24 NOTE — TELEPHONE ENCOUNTER
From: Mary Chatman  To: Harleen Gabriel  Sent: 5/23/2024 9:31 PM CDT  Subject: Glucose test     How soon can I get the 3 hours glucose test done? Can I go as soon as tomorrow? Should I fast?

## 2024-05-27 ENCOUNTER — LABORATORY ENCOUNTER (OUTPATIENT)
Dept: LAB | Facility: HOSPITAL | Age: 29
End: 2024-05-27
Attending: OBSTETRICS & GYNECOLOGY

## 2024-05-27 LAB
GLUCOSE 1H P GLC SERPL-MCNC: 141 MG/DL
GLUCOSE 2H P GLC SERPL-MCNC: 125 MG/DL
GLUCOSE 3H P GLC SERPL-MCNC: 101 MG/DL (ref 70–140)
GLUCOSE P FAST SERPL-MCNC: 87 MG/DL

## 2024-05-27 PROCEDURE — 82952 GTT-ADDED SAMPLES: CPT | Performed by: OBSTETRICS & GYNECOLOGY

## 2024-05-27 PROCEDURE — 82951 GLUCOSE TOLERANCE TEST (GTT): CPT | Performed by: OBSTETRICS & GYNECOLOGY

## 2024-05-27 PROCEDURE — 36415 COLL VENOUS BLD VENIPUNCTURE: CPT | Performed by: OBSTETRICS & GYNECOLOGY

## 2024-05-28 ENCOUNTER — HOSPITAL ENCOUNTER (OUTPATIENT)
Dept: PERINATAL CARE | Facility: HOSPITAL | Age: 29
Discharge: HOME | End: 2024-05-28
Attending: OBSTETRICS & GYNECOLOGY

## 2024-05-28 ENCOUNTER — ROUTINE PRENATAL (OUTPATIENT)
Dept: OBGYN CLINIC | Facility: CLINIC | Age: 29
End: 2024-05-28

## 2024-05-28 VITALS
BODY MASS INDEX: 42.55 KG/M2 | WEIGHT: 225.38 LBS | HEIGHT: 61 IN | DIASTOLIC BLOOD PRESSURE: 80 MMHG | SYSTOLIC BLOOD PRESSURE: 140 MMHG

## 2024-05-28 VITALS
SYSTOLIC BLOOD PRESSURE: 108 MMHG | WEIGHT: 228 LBS | DIASTOLIC BLOOD PRESSURE: 74 MMHG | BODY MASS INDEX: 43 KG/M2 | HEART RATE: 84 BPM

## 2024-05-28 DIAGNOSIS — O16.9 ELEVATED BLOOD PRESSURE AFFECTING PREGNANCY, ANTEPARTUM (HCC): ICD-10-CM

## 2024-05-28 DIAGNOSIS — O09.299 HX OF PREECLAMPSIA, PRIOR PREGNANCY, CURRENTLY PREGNANT (HCC): Chronic | ICD-10-CM

## 2024-05-28 DIAGNOSIS — R73.03 PREDIABETES: Chronic | ICD-10-CM

## 2024-05-28 DIAGNOSIS — O99.210 OBESITY IN PREGNANCY (HCC): Chronic | ICD-10-CM

## 2024-05-28 DIAGNOSIS — O99.212 MATERNAL MORBID OBESITY IN SECOND TRIMESTER, ANTEPARTUM (HCC): ICD-10-CM

## 2024-05-28 DIAGNOSIS — Z36.9 UNSPECIFIED ANTENATAL SCREENING (HCC): Primary | ICD-10-CM

## 2024-05-28 DIAGNOSIS — O99.210 OBESITY IN PREGNANCY (HCC): Primary | Chronic | ICD-10-CM

## 2024-05-28 DIAGNOSIS — E66.01 MATERNAL MORBID OBESITY IN SECOND TRIMESTER, ANTEPARTUM (HCC): ICD-10-CM

## 2024-05-28 PROCEDURE — 76825 ECHO EXAM OF FETAL HEART: CPT | Performed by: OBSTETRICS & GYNECOLOGY

## 2024-05-28 PROCEDURE — 76827 ECHO EXAM OF FETAL HEART: CPT

## 2024-05-28 PROCEDURE — 93325 DOPPLER ECHO COLOR FLOW MAPG: CPT

## 2024-05-28 PROCEDURE — 3079F DIAST BP 80-89 MM HG: CPT | Performed by: OBSTETRICS & GYNECOLOGY

## 2024-05-28 PROCEDURE — 3008F BODY MASS INDEX DOCD: CPT | Performed by: OBSTETRICS & GYNECOLOGY

## 2024-05-28 PROCEDURE — 3077F SYST BP >= 140 MM HG: CPT | Performed by: OBSTETRICS & GYNECOLOGY

## 2024-05-28 NOTE — PROGRESS NOTES
Doing well. No OB complaints. +FM.   Reviewed MFM findings and agreed with peds cards eval. Scheduled.   Continue TSH testing with Endo.   Passed 3h GTT when greater than 24 weeks. Finished GTT testing.     NATTY 4 weeks.     Dr. Georges Blue MD    [facility]     This note was created by Dragon voice recognition. Errors in content may be related to improper recognition by the system; efforts to review and correct have been done but errors may still exist. Please be advised the primary purpose of this note is for me to communicate medical care. Standard sentence structure is not always used. Medical terminology and medical abbreviations may be used. There may be grammatical, typographical, and automated fill ins that may have errors missed in proofreading.

## 2024-05-28 NOTE — PROGRESS NOTES
Outpatient Maternal-Fetal Medicine Consultation    Dear Dr. Goodman,    Thank you for requesting ultrasound evaluation and maternal fetal medicine consultation on your patient Mary Chatman.  As you are aware she is a 28 year old female with a Payne pregnancy .  A maternal-fetal medicine consultation was requested secondary to maternal morbid obesity and history of preeclampsia.  Her prenatal records and labs were reviewed.    Had a baseline protein creatinine ratio of ~0.14 (lab did not  calculate the ratio because of protein was less than 6 mg)    HISTORY  OB History    Para Term  AB Living   2 1 1 0 0 1   SAB IAB Ectopic Multiple Live Births   0 0 0 0 1     # 1 - Date: 19, Sex: Male, Weight: 8 lb 13.1 oz (4 kg), GA: 41w1d, Type: Caesarean Section, Apgar1: 8, Apgar5: 9, Living: Living, Birth Comments: Was on oxygen for TTN in [place]  Passed hearing test and cardiac screen  Received vitamin K, EES, hep B  No jaundice  Possible hypospadias    # 2 - Date: None, Sex: None, Weight: None, GA: None, Type: None, Apgar1: None, Apgar5: None, Living: None, Birth Comments: None    Past Medical History  The patient  has a past medical history of Asthma (HCC), Morbid obesity with BMI of 40.0-44.9, adult (HCC), PCOS (polycystic ovarian syndrome), Thyroid nodule, and Vitamin D deficiency.    Past Surgical History  The patient  has a past surgical history that includes tonsillectomy (); removal gallbladder (); dermatological procedure; ; and each add tooth extraction.    Family History  The patient She indicated that her mother is alive. She indicated that her father is alive. She indicated that both of her sisters are alive. She indicated that her brother is alive. She indicated that her maternal grandmother is alive. She indicated that her maternal grandfather is alive. She indicated that her paternal grandmother is alive. She indicated that her paternal grandfather is alive.  She indicated that her son is alive. She indicated that the status of her neg is unknown.      Medications:   Current Outpatient Medications:     aspirin (ASPIRIN 81) 81 MG Oral Chew Tab, Chew 1.5 tablets (121.5 mg total) by mouth daily., Disp: 180 tablet, Rfl: 3    prenatal vitamin with DHA 27-0.8-228 MG Oral Cap, Take 1 capsule by mouth daily., Disp: , Rfl:   Allergies: No Known Allergies      PHYSICAL EXAMINATION:  /74   Pulse 84   Wt 228 lb (103.4 kg)   LMP 2023 (Approximate)   BMI 43.08 kg/m²   General: alert and oriented,no acute distress  Abdomen: obese, gravid, soft, non-tender  Extremities: non-tender, no edema        DISCUSSION  During her visit we discussed and reviewed the following issues:  PRIOR PREECLAMPSIA  History:  The patient developed preeclampsia at 41 weeks.      Background  Preeclampsia -see previous note     We reviewed her risk factors for preeclampsia which include maternal obesity and a history of preeclampsia.  I agree with her OB provider that she should be on low-dose aspirin.  She can continue taking 1.5 tablets daily or increase to 2 tablets daily.    OBESITY:  Her BMI prior to pregnancy was 42.5  Obesity during pregnancy is associated with numerous maternal and  risks.  It is not clear whether obesity is a direct cause of adverse pregnancy outcome or whether the association between obesity and adverse pregnancy outcome is due to factors such as diabetes mellitus.   Data suggest that obese women should be encouraged to undertake a weight reduction program (diet, exercise, behavior modification, and possibly bariatric surgery in some cases) prior to attempting to conceive.                 We discussed the recommended plan of care based on her  risk factors.  Mary and her significant other, Mamadou, had their questions answered to their satisfaction.      OB ULTRASOUND REPORT   See imaging tab for complete ultrasound report or in PACS    Single IUP in  breech presentation.    Placenta is posterior.   A 3 vessel cord is noted.  Cardiac activity is present at 163 bpm  MVP is 4.1 cm .     Several cardiac views were limited due to maternal habitus and fetal position: 3VV and 3 vessel view trachea       _________________________________________________________  Echocardiography:  Fetus 1:      Situs:  Normal  Position of stomach:  Left sided  Heart size:  Normal  Position of apex:  normal  Systemic veins:  Normal  Pulmonary veins:  Normal  Atrial septum:  Normal  Flow at foramen ovale:  Normal  Atrioventricular junction:  Concordant, patent AV valves, equal size  Atrioventricular valve regurgit.:  None  Ventricular septum:  Intact  Ventricular function:  Normal  Great artery connections:  Normal  Arterial valve regurgitation:  None  Branch pulmonary arteries,:  Confluent, normal size  Ductal Arch:  Normal  Aortic arch:  suboptimal  Rhythm:  Sinus        FETAL ECHOCARDIOGRAM:         A transabdominal 2-D, Doppler fetal echocardiogram is performed.  There is a four-chamber heart of appropriate cardiac dimensions.  There is situs solitus with levocardia.  Intracardiac connections appear to be normal.  The atrioventricular valves appear normal.  There is no evidence of pericardial or pleural effusion.  The A-V conduction is one to one and the heart rate is appropriate for gestational age.  No evidence of fetal arrhythmias is seen during today's study.  There is a right to left shunting across the patent foramen ovale.  There is a normal appearance of the aorta and branching pattern of the head vessels.        There appears to be a structurally normal fetal heart and rhythm.  The patient was made aware of the limitations of fetal heart study: malformations such as but not limiting to minor valve, VSD or coarctation of the aorta may be missed. I discussed the results with the patient.         IMPRESSION:  IUP at 24w5d   Possible narrow aortic arch  Maternal morbid  obesity  History of term preeclampsia    RECOMMENDATIONS:  Continue care with Dr. Gabriel  F/u scheduled with Peds Cardiology  Monthly growth ultrasounds starting at 28 weeks, add BPP to each growth ultrasound at 32 weeks and beyond  Weekly NSTs at 34 weeks  Delivery 39-40 weeks  Daily low-dose aspirin (1.5-2 tablets daily)      Total time spent 25 minutes this calendar day which includes preparing to see the patient including chart review, obtaining and/or reviewing additional medical history, performing a physical exam and evaluation, documenting clinical information in the electronic medical record, independently interpreting results, counseling the patient, communicating results to the patient/family/caregiver and coordinating care.     Case discussed with patient who demonstrated understanding and agreement with plan.     Thank you for allowing me to participate in the care of this patient.  Please feel free to contact me with any questions.    Adrián Remy D.O.  Maternal Fetal Medicine      Note to patient and family:  The 21st Century Cures Act makes medical notes available to patients in the interest of transparency.  However, please be advised that this is a medical document.  It is intended as a peer to peer communication.  It is written in medical language and may contain abbreviations or verbiage that are technical and unfamiliar.  It may appear blunt or direct.  Medical documents are intended to carry relevant information, facts as evident, and the clinical opinion of the practitioner.

## 2024-06-03 PROBLEM — O35.BXX0 ABNORMAL FETAL ECHOCARDIOGRAM AFFECTING ANTEPARTUM CARE OF MOTHER (HCC): Chronic | Status: ACTIVE | Noted: 2024-06-03

## 2024-06-03 PROBLEM — O35.BXX0 ABNORMAL FETAL ECHOCARDIOGRAM AFFECTING ANTEPARTUM CARE OF MOTHER (HCC): Status: ACTIVE | Noted: 2024-06-03

## 2024-06-19 NOTE — PROGRESS NOTES
Outpatient Maternal-Fetal Medicine Follow-Up     Dear Dr. Gabriel,     Thank you for requesting ultrasound evaluation and maternal fetal medicine consultation on your patient Mary Chatman.  As you are aware she is a 28/29 year old female  with a Payne pregnancy and an Estimated Date of Delivery: 24.  She returned to maternal-fetal medicine today for a follow-up visit.  Her history was reviewed from her prior visit and there were no interval changes.     Antepartum Risk Factors  Class III obesity  Suspected fetal aortic arch abnormality  H/o preeclampsia    S: She reports good fetal movement.  She passed her 3-hour glucose tolerance test.  Her fetal echocardiogram is scheduled for  with Dr. Armstrong    PHYSICAL EXAMINATION:  /83   Pulse 91   Wt 225 lb (102.1 kg)   LMP 2023 (Approximate)   BMI 42.51 kg/m²   General: alert and oriented, no acute distress  Abdomen: gravid, soft, non-tender  Extremities: non-tender, no edema     OBSTETRIC ULTRASOUND  The patient had a fetal growth ultrasound today which I interpreted the results and reviewed them with the patient.    Ultrasound Findings:  Single IUP in breech presentation.    Placenta is posterior.   A 3 vessel cord is noted.  Cardiac activity is present at 148 bpm  EFW 1314 g ( 2 lb 14 oz); 67%.    MVP is 3.9 cm . MAXIMILIANO 11.4 cm    The fetal measurements are consistent with established EDC. No gross ultrasound evidence of structural abnormalities are seen today. The patient understands that ultrasound cannot rule out all structural and chromosomal abnormalities.     See imaging tab for the complete US report.     DISCUSSION  During her visit we discussed and reviewed the following issues:  PRIOR PREECLAMPSIA  History:  The patient developed preeclampsia at 41 weeks.   We reviewed her risk factors for preeclampsia which include maternal obesity and a history of preeclampsia.  I agree with her OB provider that  she should be on low-dose aspirin.  She can continue taking 1.5 tablets daily or increase to 2 tablets daily.  See MFM note from 24 for detailed review.       OBESITY:  Her BMI prior to pregnancy was 42.5  Obesity during pregnancy is associated with numerous maternal and  risks which were discussed previously and reviewed. See MFM note from 24 for detailed review.      Possible fetal cardiac abnormality - fetal echocardiogram with pediatric cardiology was advised. See prior [facility] note for review.     We discussed the recommended plan of care based on her  risk factors.  Mary had her questions answered to her satisfaction.   IMPRESSION:  IUP at 28w0d   Normal fetal growth and activity  Possible narrow aortic arch noted at her fetal echo  Maternal morbid obesity  History of term preeclampsia    RECOMMENDATIONS:  Continue care with Dr. Gabriel  F/u  with Peds Cardiology as scheduled  Monthly growth & BPP ultrasound  Weekly NSTs at 34 weeks  Delivery 39-40 weeks  Daily low-dose aspirin (1.5-2 tablets daily)     Total time spent 30 minutes this calendar day which includes preparing to see the patient including chart review, obtaining and/or reviewing additional medical history, performing a physical exam and evaluation, documenting clinical information in the electronic medical record, independently interpreting results, counseling the patient, communicating results to the patient/family/caregiver and coordinating care.     Case discussed with patient who demonstrated understanding and agreement with plan.     Thank you for allowing me to participate in the care of this patient.  Please feel free to contact me with any questions.    Anay Peacock MD  Maternal-Fetal Medicine       Note to patient and family:  The 21st Century Cures Act makes medical notes available to patients in the interest of transparency.  However, please be advised that this is a medical document.  It is intended  as a peer to peer communication.  It is written in medical language and may contain abbreviations or verbiage that are technical and unfamiliar.  It may appear blunt or direct.  Medical documents are intended to carry relevant information, facts as evident, and the clinical opinion of the practitioner.

## 2024-06-20 ENCOUNTER — HOSPITAL ENCOUNTER (OUTPATIENT)
Dept: PERINATAL CARE | Facility: HOSPITAL | Age: 29
Discharge: HOME | End: 2024-06-20
Attending: OBSTETRICS & GYNECOLOGY

## 2024-06-20 VITALS
BODY MASS INDEX: 43 KG/M2 | DIASTOLIC BLOOD PRESSURE: 83 MMHG | WEIGHT: 225 LBS | HEART RATE: 91 BPM | SYSTOLIC BLOOD PRESSURE: 127 MMHG

## 2024-06-20 DIAGNOSIS — O99.210 OBESITY IN PREGNANCY (HCC): Chronic | ICD-10-CM

## 2024-06-20 DIAGNOSIS — O09.299 HX OF PREECLAMPSIA, PRIOR PREGNANCY, CURRENTLY PREGNANT (HCC): Chronic | ICD-10-CM

## 2024-06-20 DIAGNOSIS — O35.BXX0: Chronic | ICD-10-CM

## 2024-06-20 DIAGNOSIS — O35.BXX0: Primary | Chronic | ICD-10-CM

## 2024-06-20 DIAGNOSIS — O16.9 ELEVATED BLOOD PRESSURE AFFECTING PREGNANCY, ANTEPARTUM (HCC): Chronic | ICD-10-CM

## 2024-06-20 PROCEDURE — 76816 OB US FOLLOW-UP PER FETUS: CPT | Performed by: OBSTETRICS & GYNECOLOGY

## 2024-06-24 ENCOUNTER — ROUTINE PRENATAL (OUTPATIENT)
Dept: OBGYN CLINIC | Facility: CLINIC | Age: 29
End: 2024-06-24

## 2024-06-24 VITALS
DIASTOLIC BLOOD PRESSURE: 76 MMHG | WEIGHT: 225.38 LBS | HEIGHT: 61 IN | BODY MASS INDEX: 42.55 KG/M2 | SYSTOLIC BLOOD PRESSURE: 120 MMHG

## 2024-06-24 DIAGNOSIS — O09.91 SUPERVISION OF HIGH RISK PREGNANCY IN FIRST TRIMESTER (HCC): ICD-10-CM

## 2024-06-24 DIAGNOSIS — O99.210 OBESITY IN PREGNANCY (HCC): Primary | ICD-10-CM

## 2024-06-24 PROCEDURE — 3078F DIAST BP <80 MM HG: CPT | Performed by: OBSTETRICS & GYNECOLOGY

## 2024-06-24 PROCEDURE — 3074F SYST BP LT 130 MM HG: CPT | Performed by: OBSTETRICS & GYNECOLOGY

## 2024-06-24 PROCEDURE — 90471 IMMUNIZATION ADMIN: CPT | Performed by: OBSTETRICS & GYNECOLOGY

## 2024-06-24 PROCEDURE — 90715 TDAP VACCINE 7 YRS/> IM: CPT | Performed by: OBSTETRICS & GYNECOLOGY

## 2024-06-24 PROCEDURE — 3008F BODY MASS INDEX DOCD: CPT | Performed by: OBSTETRICS & GYNECOLOGY

## 2024-06-24 NOTE — PROGRESS NOTES
Denies pain, bleeding, lof, positive fetal movement     [facility] ultrasound growth 6.20  Ultrasound Findings:  Single IUP in breech presentation.    Placenta is posterior.   A 3 vessel cord is noted.  Cardiac activity is present at 148 bpm  EFW 1314 g ( 2 lb 14 oz); 67%.    MVP is 3.9 cm . MAXIMILIANO 11.4 cm    28 year old  at 28w4d     Return OB  Pre- Care: UTD. Has fetal echo this week  Tdap today   Patient Active Problem List   Diagnosis    Vitamin D deficiency    Prediabetes    History of     BMI 40.0-44.9, adult ([facility])    Elevated blood pressure affecting pregnancy, antepartum ([facility])    Hx of preeclampsia, prior pregnancy, currently pregnant ([facility])    Obesity in pregnancy ([facility])    Elevated glucose tolerance test    Abnormal fetal echocardiogram affecting antepartum care of mother ([facility])     [facility] recommendations:  F/u  with Peds Cardiology as scheduled for possible fetal cardiac abnormality.   Monthly growth & BPP ultrasound  Weekly NSTs at 34 weeks  Delivery 39-40 weeks  Daily low-dose aspirin (1.5-2 tablets daily)     - Return to clinic in: 2

## 2024-06-27 ENCOUNTER — HOSPITAL ENCOUNTER (OUTPATIENT)
Dept: PERINATAL CARE | Facility: HOSPITAL | Age: 29
Discharge: HOME | End: 2024-06-27
Attending: PEDIATRICS
Payer: COMMERCIAL

## 2024-06-27 ENCOUNTER — LAB ENCOUNTER (OUTPATIENT)
Dept: LAB | Facility: HOSPITAL | Age: 29
End: 2024-06-27
Attending: PEDIATRICS
Payer: COMMERCIAL

## 2024-06-27 DIAGNOSIS — O99.280 THYROID DISEASE AFFECTING PREGNANCY (HCC): ICD-10-CM

## 2024-06-27 DIAGNOSIS — E07.9 THYROID DISEASE AFFECTING PREGNANCY (HCC): ICD-10-CM

## 2024-06-27 DIAGNOSIS — O35.BXX0: Primary | Chronic | ICD-10-CM

## 2024-06-27 DIAGNOSIS — O35.BXX0: Chronic | ICD-10-CM

## 2024-06-27 LAB — TSI SER-ACNC: 1.64 MIU/ML (ref 0.55–4.78)

## 2024-06-27 PROCEDURE — 93325 DOPPLER ECHO COLOR FLOW MAPG: CPT

## 2024-06-27 PROCEDURE — 84443 ASSAY THYROID STIM HORMONE: CPT

## 2024-06-27 PROCEDURE — 36415 COLL VENOUS BLD VENIPUNCTURE: CPT

## 2024-06-27 PROCEDURE — 76827 ECHO EXAM OF FETAL HEART: CPT

## 2024-06-27 PROCEDURE — 76825 ECHO EXAM OF FETAL HEART: CPT | Performed by: PEDIATRICS

## 2024-06-27 NOTE — PROGRESS NOTES
CARDIOLOGY CONSULTATION AND FETAL ECHOCARDIOGRAM :    Dear Dr. Goodman,     Thank you for requesting fetal echocardiogram and  cardiology  consultation on your patient Mary hCatman.  As you are aware she is a 28 year old female with a Payne pregnancy at 29w0d.    A  cardiology  consultation was requested secondary to suspected aortic arch abnormality by level 2 US.    Additional high risk factors :  Maternal morbid obesity  History of preeclampsia.      Her prenatal records and labs were reviewed.  She was accompanied by her .        HISTORY  OB History   Para Term  AB Living  2 1 1 0 0 1  SAB IAB Ectopic Multiple Live Births   0 0 0 0 1      # 1 - Date: 19, Sex: Male, Weight: 8 lb 13.1 oz (4 kg), GA: 41w1d, Type: Caesarean Section, Apgar1: 8, Apgar5: 9, Living: Living, Birth Comments: Was on oxygen for TTN in [place]  Passed hearing test and cardiac screen  Received vitamin K, EES, hep B  No jaundice  Possible hypospadias     # 2 - Date: None, Sex: None, Weight: None, GA: None, Type: None, Apgar1: None, Apgar5: None, Living: None, Birth Comments: None     Past Medical History  The patient  has a past medical history of Asthma (HCC), Morbid obesity with BMI of 40.0-44.9, adult (HCC), PCOS (polycystic ovarian syndrome), Thyroid nodule, and Vitamin D deficiency.     Past Surgical History  The patient  has a past surgical history that includes tonsillectomy (); removal gallbladder (); dermatological procedure; ; and each add tooth extraction.     Family History  The patient She indicated that her mother is alive. She indicated that her father is alive. She indicated that both of her sisters are alive. She indicated that her brother is alive. She indicated that her maternal grandmother is alive. She indicated that her maternal grandfather is alive. She indicated that her paternal grandmother is alive. She indicated that her paternal  grandfather is alive. She indicated that her son is alive. She indicated that the status of her neg is unknown.        Medications:   Medications - Current  Current Outpatient Medications:     aspirin (ASPIRIN 81) 81 MG Oral Chew Tab, Chew 1.5 tablets (121.5 mg total) by mouth daily., Disp: 180 tablet, Rfl: 3    prenatal vitamin with DHA 27-0.8-228 MG Oral Cap, Take 1 capsule by mouth daily., Disp: , Rfl:     Allergies:   AllergiesExpand by Default  No Known Allergies    FETAL ECHOCARDIOGRAM :  (Fetal heart measurements are under the imaging tab)    Situs: situs solitus (normal). Cardiac position: levocardia (normal). Cardiac axis: normal. Cardiac size: normal (approx. 1/3 of thoracic area). Cardiac rhythm: regular (normal). Cardiac function: good contractility (normal). 4-chamber view: normal. LVOT view: normal. RVOT view: normal. 3-vessel view: normal. 3-vessel-trachea view: normal. Long axis view: normal. Aortic arch view: Subjectively mild isthmic narrowing with normal forward flow. Only two aortic arch branches were noted in today's scan. Wide separation between the arch branches. Ductal arch view: normal. Bicaval view: normal    AV connections: Normal connections. VA connections: Normal connections. IVC: Normal entrance into the right atrium. SVC: Normal entrance into the right atrium. Pulmonary veins: Two pulmonary veins entry into the left atrium. Right atrium: Normal in size. Left atrium: Normal in size. Atrial septum: foramen ovale in the central third/half, flap valve in LA. Foramen ovale: normal flow: right to left. Right ventricle: Normal sized RV with normal systolic functions. Left ventricle: Normal sized LV with normal systolic functions. Ventricular septum: Visualized portions of the ventricular are intact  Tricuspid valve: normal size and morphology. Mitral valve: normal size and morphology. Pulmonary valve: normal size . Aortic valve: normal size. Cross-over gr. arteries: anterior great artery  (confirmed to be the pulmonary artery by its branching) which crosses the course of the proximal aorta, indicative of normal relationship of the great arteries. Main PA: the main pulmonary artery can be seen bifurcating into the ductus arteriosus and the right pulmonary artery. Ascending aorta: normal size and morphology. Ductal arch: Left ductus arteriosus. Aortic arch: Single left sided aortic arch . Ductus venosus: normal. Umbilical vein: normal. Umbilical arteries: normal  Echogenic focus: no. Linear insertion of AV valves: no. Pericardial effusion: no  Color Doppler (Qualitatively):   IVC inflow into RA: normal. SVC inflow into RA: normal. Pulm. veins inflow into LA: normal. Flow through foramen ovale: right-left shunt (normal). Tricuspid valve flow: normal. Mitral valve flow: normal. Ventricular septum: normal. RVOT / Pulmonary valve flow: normal. LVOT / Aortic valve flow: normal. Flow in pulmonary arteries: normal. Flow in ductus arteriosus: normal. Flow in aortic arch: normal. Flow in ductus venosus: normal. Flow in the umbilical vein: normal. Flow in the umbilical arteries: normal      Fetal echocardiogram findings are described above. There was a subjectively mild isthmic narrowing with normal forward flow. Only two aortic arch branches were noted in today's scan. Wide separation between the arch branches. Otherwise limited unremarkable fetal echocardiogram. No major structural or functional abnormality was noted.    I had a lengthy discussion regarding my findings with the parents. I drew a picture of the fetal heart and went over my findings with them. I answered their questions. They seemed to have a good understanding the issues we talked.    Small VSDs,minor valve problems,coronary artery anomalies,PAPVR and coarctation of the aorta cannot be excluded by fetal echocardiograms.     IMPRESSION :    IUP at 29w 0d  Normal fetal growth and activity  Possible narrow aortic arch noted at her level 2 US. No  other anomalies were noted  Maternal morbid obesity  History of term preeclampsia  Fetal echocardiogram : Subjectively mild isthmic narrowing with normal forward flow. Only two aortic arch branches were noted in today's scan. Wide separation between the arch branches. Otherwise limited unremarkable fetal echocardiogram. No major structural or functional abnormality was noted.    RECOMMENDATIONS :    Routine Ob and MFM care  She can deliver the infant at [facility]  Follow up fetal echocardiogram in 6 weeks   \"coarct watch\" with serial echocardiograms prior to discharge home      Thank you for allowing me to participate in the care of this patient.  Please feel free to contact me with any questions.     Nkechi Armstrong MD  Fetal/Pediatric Cardiology     Total time spent 60 minutes this calendar day which includes preparing to see the patient including chart review, obtaining and/or reviewing additional medical history,documenting clinical information in the electronic medical record, independently interpreting results, counseling the patient, communicating results to the patient/family/caregiver and coordinating care.        Note to patient and family:  The  Century Cures Act makes medical notes available to patients in the interest of transparency.  However, please be advised that this is a medical document.  It is intended as a peer to peer communication.  It is written in medical language and may contain abbreviations or verbiage that are technical and unfamiliar.  It may appear blunt or direct.  Medical documents are intended to carry relevant information, facts as evident, and the clinical opinion of the practitioner

## 2024-06-28 DIAGNOSIS — E06.3 HASHIMOTO'S DISEASE: Primary | ICD-10-CM

## 2024-07-08 ENCOUNTER — TELEPHONE (OUTPATIENT)
Dept: OBGYN CLINIC | Facility: CLINIC | Age: 29
End: 2024-07-08

## 2024-07-08 ENCOUNTER — ROUTINE PRENATAL (OUTPATIENT)
Dept: OBGYN CLINIC | Facility: CLINIC | Age: 29
End: 2024-07-08
Payer: COMMERCIAL

## 2024-07-08 VITALS
BODY MASS INDEX: 42.48 KG/M2 | WEIGHT: 225 LBS | HEIGHT: 61 IN | SYSTOLIC BLOOD PRESSURE: 124 MMHG | DIASTOLIC BLOOD PRESSURE: 70 MMHG

## 2024-07-08 DIAGNOSIS — O35.BXX0 ANOMALY OF HEART OF FETUS AFFECTING PREGNANCY, ANTEPARTUM, SINGLE OR UNSPECIFIED FETUS (HCC): ICD-10-CM

## 2024-07-08 DIAGNOSIS — O09.91 SUPERVISION OF HIGH RISK PREGNANCY IN FIRST TRIMESTER (HCC): Primary | ICD-10-CM

## 2024-07-08 DIAGNOSIS — O99.210 OBESITY IN PREGNANCY (HCC): ICD-10-CM

## 2024-07-08 NOTE — PROGRESS NOTES
Denies pain, bleeding, lof, positive fetal movement       28 year old  at 30w4d     Return OB  Pre-Hal Care: UTD.   - fetal echo showed some narrowing of aortic vessels, ok to delivery at [place].  - will follow closely with MFM, then peds cardiology after delivery.      Patient Active Problem List   Diagnosis    Vitamin D deficiency    Prediabetes    History of     BMI 40.0-44.9, adult ([facility])    Elevated blood pressure affecting pregnancy, antepartum ([facility])    Hx of preeclampsia, prior pregnancy, currently pregnant ([facility])    Obesity in pregnancy ([facility])    Elevated glucose tolerance test    Abnormal fetal echocardiogram affecting antepartum care of mother ([facility])     Mfm recommendations:  F/u  with Peds Cardiology as scheduled for possible fetal cardiac abnormality.   Monthly growth & BPP ultrasound  Weekly NSTs at 34 weeks  Delivery 39-40 weeks  Daily low-dose aspirin (1.5-2 tablets daily)     - Return to clinic in: 2wks    Manuela Sheehan DO

## 2024-07-08 NOTE — TELEPHONE ENCOUNTER
Pt request breast pump order. Referral dme placed. Pending provider signature to fax.    Received notice of DME approval.

## 2024-07-22 ENCOUNTER — HOSPITAL ENCOUNTER (OUTPATIENT)
Dept: PERINATAL CARE | Facility: HOSPITAL | Age: 29
Discharge: HOME | End: 2024-07-22
Attending: OBSTETRICS & GYNECOLOGY
Payer: COMMERCIAL

## 2024-07-22 ENCOUNTER — HOSPITAL ENCOUNTER (OUTPATIENT)
Dept: PERINATAL CARE | Facility: HOSPITAL | Age: 29
End: 2024-07-22
Attending: OBSTETRICS & GYNECOLOGY
Payer: COMMERCIAL

## 2024-07-22 ENCOUNTER — ROUTINE PRENATAL (OUTPATIENT)
Dept: OBGYN CLINIC | Facility: CLINIC | Age: 29
End: 2024-07-22
Payer: COMMERCIAL

## 2024-07-22 VITALS
DIASTOLIC BLOOD PRESSURE: 76 MMHG | HEIGHT: 61 IN | SYSTOLIC BLOOD PRESSURE: 118 MMHG | WEIGHT: 225.69 LBS | BODY MASS INDEX: 42.61 KG/M2

## 2024-07-22 VITALS
SYSTOLIC BLOOD PRESSURE: 118 MMHG | WEIGHT: 224 LBS | DIASTOLIC BLOOD PRESSURE: 80 MMHG | HEART RATE: 92 BPM | BODY MASS INDEX: 42 KG/M2

## 2024-07-22 DIAGNOSIS — O99.210 OBESITY IN PREGNANCY (HCC): Chronic | ICD-10-CM

## 2024-07-22 DIAGNOSIS — E66.01 MATERNAL MORBID OBESITY IN THIRD TRIMESTER, ANTEPARTUM (HCC): ICD-10-CM

## 2024-07-22 DIAGNOSIS — Z34.83 ENCOUNTER FOR SUPERVISION OF OTHER NORMAL PREGNANCY IN THIRD TRIMESTER (HCC): Primary | ICD-10-CM

## 2024-07-22 DIAGNOSIS — O09.299 HX OF PREECLAMPSIA, PRIOR PREGNANCY, CURRENTLY PREGNANT (HCC): Chronic | ICD-10-CM

## 2024-07-22 DIAGNOSIS — O99.213 MATERNAL MORBID OBESITY IN THIRD TRIMESTER, ANTEPARTUM (HCC): ICD-10-CM

## 2024-07-22 DIAGNOSIS — O35.BXX0: Chronic | ICD-10-CM

## 2024-07-22 DIAGNOSIS — O35.BXX0: Primary | Chronic | ICD-10-CM

## 2024-07-22 PROCEDURE — 76816 OB US FOLLOW-UP PER FETUS: CPT | Performed by: OBSTETRICS & GYNECOLOGY

## 2024-07-22 PROCEDURE — 76819 FETAL BIOPHYS PROFIL W/O NST: CPT

## 2024-07-22 NOTE — PROGRESS NOTES
Outpatient Maternal-Fetal Medicine Follow-Up     Dear Dr. Gabriel,     Thank you for requesting ultrasound evaluation and maternal fetal medicine consultation on your patient Mary Chatman.  As you are aware she is a 28/29 year old female  with a Payne pregnancy and an Estimated Date of Delivery: 24.  She returned to maternal-fetal medicine today for a follow-up visit.  Her history was reviewed from her prior visit and there were no interval changes.     Antepartum Risk Factors  Class III obesity  Suspected fetal aortic arch abnormality  H/o preeclampsia    S: She reports good fetal movement.      PHYSICAL EXAMINATION:  /80   Pulse 92   Wt 224 lb (101.6 kg)   LMP 2023 (Approximate)   BMI 42.32 kg/m²   General: alert and oriented, no acute distress  Abdomen: gravid, soft, non-tender  Extremities: non-tender, no edema          DISCUSSION  During her visit we discussed and reviewed the following issues:  PRIOR PREECLAMPSIA  History:  The patient developed preeclampsia at 41 weeks.   We reviewed her risk factors for preeclampsia which include maternal obesity and a history of preeclampsia.  I agree with her OB provider that she should be on low-dose aspirin.  She can continue taking 1.5 tablets daily or increase to 2 tablets daily.  See MFM note from 24 for detailed review.       OBESITY:  Her BMI prior to pregnancy was 42.5  Obesity during pregnancy is associated with numerous maternal and  risks which were discussed previously and reviewed. See MFM note from 24 for detailed review.      Possible fetal cardiac abnormality - fetal echocardiogram with pediatric cardiology was advised. See prior MFM note for review.     We discussed the recommended plan of care based on her  risk factors.  Mary had her questions answered to her satisfaction.       OB ULTRASOUND REPORT   See imaging tab for complete ultrasound report or in PACS    Single IUP in  breech presentation.    Placenta is posterior.   A 3 vessel cord is noted.  Cardiac activity is present at 162 bpm  EFW 2103 g ( 4 lb 10 oz); 53%.    MAXIMILIANO is  10.4 cm.  MVP is 5.3 cm    Subjectively mild isthmic narrowing with normal forward flow.      BIOPHYSICAL PROFILE:  Movement:    2/2  Tone:            2/2  Breathin/2  Fluid:             2/2  TOTAL:             IMPRESSION:  IUP at 32w4d   Normal fetal growth and activity  Possible narrow aortic arch noted at her fetal echo  Maternal morbid obesity  History of term preeclampsia    RECOMMENDATIONS:  Continue care with Dr. Gabriel  F/u  with Peds Cardiology as scheduled  Monthly growth & BPP ultrasound  Weekly NSTs at 34 weeks  Delivery 39-40 weeks  Daily low-dose aspirin (1.5-2 tablets daily)     Total time spent  20 minutes this calendar day which includes preparing to see the patient including chart review, obtaining and/or reviewing additional medical history, performing a physical exam and evaluation, documenting clinical information in the electronic medical record, independently interpreting results, counseling the patient, communicating results to the patient/family/caregiver and coordinating care.     Case discussed with patient who demonstrated understanding and agreement with plan.     Thank you for allowing me to participate in the care of this patient.  Please feel free to contact me with any questions.    Adrián Remy D.O.  Maternal Fetal Medicine        Note to patient and family:  The 21st Century Cures Act makes medical notes available to patients in the interest of transparency.  However, please be advised that this is a medical document.  It is intended as a peer to peer communication.  It is written in medical language and may contain abbreviations or verbiage that are technical and unfamiliar.  It may appear blunt or direct.  Medical documents are intended to carry relevant information, facts as evident, and the  clinical opinion of the practitioner.

## 2024-07-22 NOTE — PROGRESS NOTES
Doing well. No OB complaints but more pressure. +FM.   Reviewed MFM recommendations for weekly NSTs at 34 weeks.     NATTY 2 weeks.     Dr. Georges Blue MD    [facility]     This note was created by Dragon voice recognition. Errors in content may be related to improper recognition by the system; efforts to review and correct have been done but errors may still exist. Please be advised the primary purpose of this note is for me to communicate medical care. Standard sentence structure is not always used. Medical terminology and medical abbreviations may be used. There may be grammatical, typographical, and automated fill ins that may have errors missed in proofreading.

## 2024-08-02 ENCOUNTER — PATIENT MESSAGE (OUTPATIENT)
Dept: OBGYN CLINIC | Facility: CLINIC | Age: 29
End: 2024-08-02

## 2024-08-02 DIAGNOSIS — O26.841 SIZE OF FETUS INCONSISTENT WITH DATES IN FIRST TRIMESTER (HCC): ICD-10-CM

## 2024-08-02 DIAGNOSIS — O99.210 OBESITY IN PREGNANCY (HCC): ICD-10-CM

## 2024-08-02 DIAGNOSIS — O35.BXX0: ICD-10-CM

## 2024-08-02 DIAGNOSIS — R73.03 PREDIABETES: ICD-10-CM

## 2024-08-07 NOTE — TELEPHONE ENCOUNTER
From: Mary Chatman  To: Georges Blue  Sent: 8/2/2024 2:45 PM CDT  Subject: Referral request     Hi I recently received a bill from doctor Patti, the pediatric cardiologist for my previous visit. This bill is showing I have to pay full price because there was no referral put in place. I reached out to maternal fetal thinking they were the ones that needed to put the referral in but they said their the ones that made the recommendation to see Dr. BLUM and you ondina were supposed to put in the referral. Can you help me fix this? Can it still be backtrack? I am unable to pay full price and I also have another appointment coming up with Dr. BLUM so if you can please send over the referral so this doesn’t reoccurred. Thank you!

## 2024-08-08 ENCOUNTER — ROUTINE PRENATAL (OUTPATIENT)
Dept: OBGYN CLINIC | Facility: CLINIC | Age: 29
End: 2024-08-08
Payer: COMMERCIAL

## 2024-08-08 ENCOUNTER — HOSPITAL ENCOUNTER (OUTPATIENT)
Facility: HOSPITAL | Age: 29
Discharge: HOME | End: 2024-08-08
Attending: OBSTETRICS & GYNECOLOGY | Admitting: OBSTETRICS & GYNECOLOGY
Payer: COMMERCIAL

## 2024-08-08 VITALS
SYSTOLIC BLOOD PRESSURE: 148 MMHG | BODY MASS INDEX: 41.72 KG/M2 | WEIGHT: 221 LBS | HEIGHT: 61 IN | DIASTOLIC BLOOD PRESSURE: 90 MMHG

## 2024-08-08 VITALS
TEMPERATURE: 99 F | RESPIRATION RATE: 16 BRPM | HEART RATE: 72 BPM | DIASTOLIC BLOOD PRESSURE: 81 MMHG | SYSTOLIC BLOOD PRESSURE: 123 MMHG

## 2024-08-08 DIAGNOSIS — R03.0 ELEVATED BP WITHOUT DIAGNOSIS OF HYPERTENSION: ICD-10-CM

## 2024-08-08 DIAGNOSIS — Z34.83 ENCOUNTER FOR SUPERVISION OF OTHER NORMAL PREGNANCY IN THIRD TRIMESTER (HCC): Primary | ICD-10-CM

## 2024-08-08 DIAGNOSIS — O99.210 OBESITY IN PREGNANCY (HCC): ICD-10-CM

## 2024-08-08 LAB
ALBUMIN SERPL-MCNC: 4.2 G/DL (ref 3.2–4.8)
ALBUMIN/GLOB SERPL: 1.2 {RATIO} (ref 1–2)
ALP LIVER SERPL-CCNC: 126 U/L
ALT SERPL-CCNC: 28 U/L
ANION GAP SERPL CALC-SCNC: 8 MMOL/L (ref 0–18)
AST SERPL-CCNC: 21 U/L (ref ?–34)
BASOPHILS # BLD AUTO: 0.04 X10(3) UL (ref 0–0.2)
BASOPHILS NFR BLD AUTO: 0.4 %
BILIRUB SERPL-MCNC: 0.4 MG/DL (ref 0.3–1.2)
BUN BLD-MCNC: 7 MG/DL (ref 9–23)
BUN/CREAT SERPL: 11.7 (ref 10–20)
CALCIUM BLD-MCNC: 9.2 MG/DL (ref 8.7–10.4)
CHLORIDE SERPL-SCNC: 109 MMOL/L (ref 98–112)
CO2 SERPL-SCNC: 22 MMOL/L (ref 21–32)
CREAT BLD-MCNC: 0.6 MG/DL
CREAT UR-SCNC: 46.4 MG/DL
DEPRECATED RDW RBC AUTO: 41.3 FL (ref 35.1–46.3)
EGFRCR SERPLBLD CKD-EPI 2021: 125 ML/MIN/1.73M2 (ref 60–?)
EOSINOPHIL # BLD AUTO: 0.08 X10(3) UL (ref 0–0.7)
EOSINOPHIL NFR BLD AUTO: 0.9 %
ERYTHROCYTE [DISTWIDTH] IN BLOOD BY AUTOMATED COUNT: 12.6 % (ref 11–15)
FASTING STATUS PATIENT QL REPORTED: NO
GLOBULIN PLAS-MCNC: 3.5 G/DL (ref 2–3.5)
GLUCOSE BLD-MCNC: 82 MG/DL (ref 70–99)
HCT VFR BLD AUTO: 37.7 %
HGB BLD-MCNC: 13 G/DL
IMM GRANULOCYTES # BLD AUTO: 0.03 X10(3) UL (ref 0–1)
IMM GRANULOCYTES NFR BLD: 0.3 %
LYMPHOCYTES # BLD AUTO: 1.68 X10(3) UL (ref 1–4)
LYMPHOCYTES NFR BLD AUTO: 18.6 %
MCH RBC QN AUTO: 31.6 PG (ref 26–34)
MCHC RBC AUTO-ENTMCNC: 34.5 G/DL (ref 31–37)
MCV RBC AUTO: 91.5 FL
MONOCYTES # BLD AUTO: 0.69 X10(3) UL (ref 0.1–1)
MONOCYTES NFR BLD AUTO: 7.6 %
NEUTROPHILS # BLD AUTO: 6.53 X10 (3) UL (ref 1.5–7.7)
NEUTROPHILS # BLD AUTO: 6.53 X10(3) UL (ref 1.5–7.7)
NEUTROPHILS NFR BLD AUTO: 72.2 %
OSMOLALITY SERPL CALC.SUM OF ELEC: 285 MOSM/KG (ref 275–295)
PLATELET # BLD AUTO: 231 10(3)UL (ref 150–450)
PLATELETS.RETICULATED NFR BLD AUTO: 22.2 % (ref 0–7)
POTASSIUM SERPL-SCNC: 3.9 MMOL/L (ref 3.5–5.1)
PROT SERPL-MCNC: 7.7 G/DL (ref 5.7–8.2)
PROT UR-MCNC: 6.3 MG/DL (ref ?–14)
PROT/CREAT UR-RTO: 0.14
RBC # BLD AUTO: 4.12 X10(6)UL
SODIUM SERPL-SCNC: 139 MMOL/L (ref 136–145)
WBC # BLD AUTO: 9.1 X10(3) UL (ref 4–11)

## 2024-08-08 PROCEDURE — 36415 COLL VENOUS BLD VENIPUNCTURE: CPT

## 2024-08-08 PROCEDURE — 3077F SYST BP >= 140 MM HG: CPT | Performed by: OBSTETRICS & GYNECOLOGY

## 2024-08-08 PROCEDURE — 82570 ASSAY OF URINE CREATININE: CPT | Performed by: OBSTETRICS & GYNECOLOGY

## 2024-08-08 PROCEDURE — 3080F DIAST BP >= 90 MM HG: CPT | Performed by: OBSTETRICS & GYNECOLOGY

## 2024-08-08 PROCEDURE — 80053 COMPREHEN METABOLIC PANEL: CPT | Performed by: OBSTETRICS & GYNECOLOGY

## 2024-08-08 PROCEDURE — 59025 FETAL NON-STRESS TEST: CPT

## 2024-08-08 PROCEDURE — 84156 ASSAY OF PROTEIN URINE: CPT | Performed by: OBSTETRICS & GYNECOLOGY

## 2024-08-08 PROCEDURE — 85025 COMPLETE CBC W/AUTO DIFF WBC: CPT | Performed by: OBSTETRICS & GYNECOLOGY

## 2024-08-08 PROCEDURE — 99213 OFFICE O/P EST LOW 20 MIN: CPT

## 2024-08-08 PROCEDURE — 3008F BODY MASS INDEX DOCD: CPT | Performed by: OBSTETRICS & GYNECOLOGY

## 2024-08-08 PROCEDURE — 59025 FETAL NON-STRESS TEST: CPT | Performed by: OBSTETRICS & GYNECOLOGY

## 2024-08-08 NOTE — PROGRESS NOTES
Denies pain, bleeding, lof, positive fetal movement   Denies ha, vision changes, RUQ pain.    28 year old  at 35w0d     Return OB  Pre- Care: UTD.   - fetal echo showed some narrowing of aortic vessels, ok to delivery at [place].  - will follow closely with MFM, then peds cardiology after delivery.  - NST reactive today    Mfm recommendations:  F/u  with Peds Cardiology as scheduled for possible fetal cardiac abnormality.   Monthly growth & BPP ultrasound  Weekly NSTs at 34 weeks  Delivery 39-40 weeks  Daily low-dose aspirin (1.5-2 tablets daily)       Elevated BP today  - on review of chart, had several elevated BP earlier this pregnancy  - to OB triage for r/o pre-eclampsia - OB triage nurse notified.    Patient Active Problem List   Diagnosis    Vitamin D deficiency    Prediabetes    History of     BMI 40.0-44.9, adult ([facility])    Elevated blood pressure affecting pregnancy, antepartum ([facility])    Hx of preeclampsia, prior pregnancy, currently pregnant ([facility])    Obesity in pregnancy ([facility])    Elevated glucose tolerance test    Abnormal fetal echocardiogram affecting antepartum care of mother ([facility])    Breech presentation ([facility])       - Return to clinic in: 1 wks    Manuela Sheehan DO

## 2024-08-08 NOTE — DISCHARGE INSTRUCTIONS
Take bps at home once in the morning and once midday, if top number greater than 159 and/or bottom number greater than 109, repeat in 10-15 min, if still elevated call OB    Discharge Instructions    Diet: regular  Activity: Normal activity         General Instructions    Call your OB doctor if: Fluid leaking from your vagina;Uterine contractions 10 minutes or closer for 1 to 2 hours;Uterine contractions increasing in intensity and frequency;Decrease in fetal movement;Vaginal bleeding;Temperature greater than 100F;Vaginal or rectal pressure

## 2024-08-08 NOTE — PROGRESS NOTES
Pt is a 28 year old female admitted to TR2/[facility]-A.     Chief Complaint   Patient presents with    R/o Preeclampsia     Sent from office with elevated blood pressures      Pt is  35w0d intra-uterine pregnancy.  History obtained, consents signed. Oriented to room, staff, and plan of care.

## 2024-08-08 NOTE — TRIAGE
[facility]  part of [facility]      Triage Note    Mary Chatman Patient Status:  Outpatient    8/15/1995 MRN [id number]   Location [facility] C[facility] Attending Dara Huggins MD   Hosp Day # 0 PCP John Polanco MD      Para:   Estimated Date of Delivery: 24  Gestation: 35w0d    Chief Complaint    R/o Preeclampsia         Allergies:  No Known Allergies    Orders Placed This Encounter   Procedures    Comp Metabolic Panel (14)    CBC With Differential With Platelet    Protein/Creatinine Ratio, Urine Random       Lab Results   Component Value Date    WBC 9.1 2024    HGB 13.0 2024    HCT 37.7 2024    .0 2024    CREATSERUM 0.60 2024    BUN 7 (L) 2024     2024    K 3.9 2024     2024    CO2 22.0 2024    GLU 82 2024    CA 9.2 2024    ALB 4.2 2024    ALKPHO 126 (H) 2024    BILT 0.4 2024    TP 7.7 2024    AST 21 2024    ALT 28 2024    T4F 1.3 2020    TSH 1.635 2024    DDIMER 0.59 (H) 2024    ESRML 23 (H) 2021    CRP 2.00 (H) 2021    TROPHS 14 2024     2021    B12 569 2023    FFN Negative 2019       Clinitek UA  Lab Results   Component Value Date    GLUUR Normal 2024    SPECGRAVITY <1.005 (L) 2024    URINECUL 10,000 - 50,000 CFU/ML Mixed gram positive jaylon 2024       UA  Lab Results   Component Value Date    COLORUR Colorless (A) 2024    CLARITY Clear 2024    SPECGRAVITY <1.005 (L) 2024    PROUR Negative 2024    GLUUR Normal 2024    KETUR Negative 2024    BILUR Negative 2024    BLOODURINE Negative 2024    NITRITE Negative 2024    UROBILINOGEN Normal 2024    LEUUR 25 (A) 2024    UASA Negative 2019       Vitals:    24 1630 24 1645 24 1700 24 1715   BP: 96/64 95/69  118/75 123/81   BP Location:       Pulse: 88 79 71 72   Resp:       Temp:       TempSrc:           NST  Variability: Moderate           Accelerations: Yes           Decelerations: None            Baseline: 140 BPM           Uterine Irritability: No           Contractions: Irregular                    Contraction Frequency: x1                   Acoustic Stimulator: No           Nonstress Test Interpretation: Reactive           Nonstress Test Second Interpretation: Reactive          FHR Category: Category I             Additional Comments Comments: , 35 weeks, sent from office with high bps, nst reactive, labs and bps wnl in triage, divya rubio notified and pt discharged home, discaharge avs given and reviewed and no further questions upon discharge. preeclampsia warning signs reviewed     Chief Complaint   Patient presents with    R/o Preeclampsia     Sent from office with elevated blood pressures         Allyssa BLUM RN  2024 5:24 PM

## 2024-08-09 ENCOUNTER — PATIENT MESSAGE (OUTPATIENT)
Dept: OBGYN CLINIC | Facility: CLINIC | Age: 29
End: 2024-08-09

## 2024-08-09 NOTE — TELEPHONE ENCOUNTER
From: Mary Chatman  To: Manuela Sheehan  Sent: 8/9/2024 9:31 AM CDT  Subject: Lactation support/ Referral     Hello,    I wanted to know of any recommendations and/or support groups/classes that can help with breastfeeding? After yesterdays hospital visit due to my high blood pressure it looks like baby will come sooner than later, I was told doctors plan to induce in 2 weeks but will talk more in detail on my next visit. Also I know you guys are working on the referral for the cardiologist for the appointment that pass already but I just wanted to make sure there was one put in place for my upcoming appointment on August 19. Thanks and hope to hear from you soon!

## 2024-08-13 ENCOUNTER — TELEPHONE (OUTPATIENT)
Dept: OBGYN CLINIC | Facility: CLINIC | Age: 29
End: 2024-08-13

## 2024-08-13 DIAGNOSIS — O35.BXX0: Primary | ICD-10-CM

## 2024-08-14 ENCOUNTER — ROUTINE PRENATAL (OUTPATIENT)
Dept: OBGYN CLINIC | Facility: CLINIC | Age: 29
End: 2024-08-14
Payer: COMMERCIAL

## 2024-08-14 ENCOUNTER — HOSPITAL ENCOUNTER (OUTPATIENT)
Dept: PERINATAL CARE | Facility: HOSPITAL | Age: 29
Discharge: HOME | End: 2024-08-14
Attending: OBSTETRICS & GYNECOLOGY
Payer: COMMERCIAL

## 2024-08-14 VITALS
BODY MASS INDEX: 42 KG/M2 | HEART RATE: 73 BPM | SYSTOLIC BLOOD PRESSURE: 132 MMHG | DIASTOLIC BLOOD PRESSURE: 78 MMHG | WEIGHT: 221 LBS

## 2024-08-14 VITALS
SYSTOLIC BLOOD PRESSURE: 124 MMHG | DIASTOLIC BLOOD PRESSURE: 64 MMHG | BODY MASS INDEX: 42.2 KG/M2 | WEIGHT: 223.5 LBS | HEIGHT: 61 IN

## 2024-08-14 DIAGNOSIS — O35.BXX0: Chronic | ICD-10-CM

## 2024-08-14 DIAGNOSIS — O09.299 HX OF PREECLAMPSIA, PRIOR PREGNANCY, CURRENTLY PREGNANT (HCC): Chronic | ICD-10-CM

## 2024-08-14 DIAGNOSIS — O99.210 OBESITY IN PREGNANCY (HCC): Chronic | ICD-10-CM

## 2024-08-14 DIAGNOSIS — O09.93 SUPERVISION OF HIGH RISK PREGNANCY IN THIRD TRIMESTER (HCC): Primary | ICD-10-CM

## 2024-08-14 DIAGNOSIS — O16.9 ELEVATED BLOOD PRESSURE AFFECTING PREGNANCY, ANTEPARTUM (HCC): Chronic | ICD-10-CM

## 2024-08-14 DIAGNOSIS — O16.9 ELEVATED BLOOD PRESSURE AFFECTING PREGNANCY, ANTEPARTUM (HCC): Primary | Chronic | ICD-10-CM

## 2024-08-14 DIAGNOSIS — O35.BXX0: ICD-10-CM

## 2024-08-14 DIAGNOSIS — E66.01 MATERNAL MORBID OBESITY IN THIRD TRIMESTER, ANTEPARTUM (HCC): ICD-10-CM

## 2024-08-14 DIAGNOSIS — O99.213 MATERNAL MORBID OBESITY IN THIRD TRIMESTER, ANTEPARTUM (HCC): ICD-10-CM

## 2024-08-14 PROCEDURE — 3078F DIAST BP <80 MM HG: CPT | Performed by: OBSTETRICS & GYNECOLOGY

## 2024-08-14 PROCEDURE — 3008F BODY MASS INDEX DOCD: CPT | Performed by: OBSTETRICS & GYNECOLOGY

## 2024-08-14 PROCEDURE — 76819 FETAL BIOPHYS PROFIL W/O NST: CPT

## 2024-08-14 PROCEDURE — 76816 OB US FOLLOW-UP PER FETUS: CPT | Performed by: OBSTETRICS & GYNECOLOGY

## 2024-08-14 PROCEDURE — 3074F SYST BP LT 130 MM HG: CPT | Performed by: OBSTETRICS & GYNECOLOGY

## 2024-08-14 PROCEDURE — 59025 FETAL NON-STRESS TEST: CPT | Performed by: OBSTETRICS & GYNECOLOGY

## 2024-08-14 NOTE — PROGRESS NOTES
Doing well. No OB complaints. +FM.   Feeling better.   Reviewed rare elevated blood presure and normal home blood pressure.   Recommend to continue to monitor blood presure and if any more elevated blood presure to have induction of labor at 37 weeks or beyond.   GSB next week.     NATTY 1 week with NST.     Dr. Georges Blue MD    [facility]     This note was created by [employer] voice recognition. Errors in content may be related to improper recognition by the system; efforts to review and correct have been done but errors may still exist. Please be advised the primary purpose of this note is for me to communicate medical care. Standard sentence structure is not always used. Medical terminology and medical abbreviations may be used. There may be grammatical, typographical, and automated fill ins that may have errors missed in proofreading.

## 2024-08-14 NOTE — PROGRESS NOTES
Outpatient Maternal-Fetal Medicine Follow-Up     Dear Dr. Gabriel,     Thank you for requesting ultrasound evaluation and maternal fetal medicine consultation on your patient Mary Chatman.  As you are aware she is a 28/29 year old female  with a Payne pregnancy and an Estimated Date of Delivery: 24.  She returned to maternal-fetal medicine today for a follow-up visit.  Her history was reviewed from her prior visit and there were no interval changes.     Antepartum Risk Factors  Class III obesity  Suspected fetal aortic arch abnormality  H/o preeclampsia    S: She reports good fetal movement.      PHYSICAL EXAMINATION:  /78   Pulse 73   Wt 221 lb (100.2 kg)   LMP 2023 (Approximate)   BMI 41.76 kg/m²   General: alert and oriented, no acute distress  Abdomen: gravid, soft, non-tender  Extremities: non-tender, no edema          DISCUSSION  During her visit we discussed and reviewed the following issues:  PRIOR PREECLAMPSIA  History:  The patient developed preeclampsia at 41 weeks.   We reviewed her risk factors for preeclampsia which include maternal obesity and a history of preeclampsia.  I agree with her OB provider that she should be on low-dose aspirin.  She can continue taking 1.5 tablets daily or increase to 2 tablets daily.  See MFM note from 24 for detailed review.       OBESITY:  Her BMI prior to pregnancy was 42.5  Obesity during pregnancy is associated with numerous maternal and  risks which were discussed previously and reviewed. See MFM note from 24 for detailed review.      Possible fetal cardiac abnormality - fetal echocardiogram with pediatric cardiology was advised. See prior MFM note for review.     We discussed the recommended plan of care based on her  risk factors.  Mary had her questions answered to her satisfaction.       OB ULTRASOUND REPORT   See imaging tab for complete ultrasound report or in PACS      Single IUP in  cephalic presentation.    Placenta is posterior.   A 3 vessel cord is noted.  Cardiac activity is present at 149 bpm  EFW 2754 g ( 6 lb 1 oz); 45%.    MAXIMILIANO is  7.6 cm.  MVP is 2.7 cm      BIOPHYSICAL PROFILE:  Movement:    2/2  Tone:            2/2  Breathin/2  Fluid:             2/2  TOTAL:             IMPRESSION:  IUP at 35w6d   Normal fetal growth and activity  Possible narrow aortic arch noted at her fetal echo  Maternal morbid obesity  History of term preeclampsia    RECOMMENDATIONS:  Continue care with Dr. Gabriel  F/u  with Peds Cardiology as scheduled  Weekly NSTs   Delivery 39-40 weeks  Daily low-dose aspirin (1.5-2 tablets daily), stop at 37wks     Total time spent  20 minutes this calendar day which includes preparing to see the patient including chart review, obtaining and/or reviewing additional medical history, performing a physical exam and evaluation, documenting clinical information in the electronic medical record, independently interpreting results, counseling the patient, communicating results to the patient/family/caregiver and coordinating care.     Case discussed with patient who demonstrated understanding and agreement with plan.     Thank you for allowing me to participate in the care of this patient.  Please feel free to contact me with any questions.    Adrián Remy D.O.  Maternal Fetal Medicine        Note to patient and family:  The 21st Century Cures Act makes medical notes available to patients in the interest of transparency.  However, please be advised that this is a medical document.  It is intended as a peer to peer communication.  It is written in medical language and may contain abbreviations or verbiage that are technical and unfamiliar.  It may appear blunt or direct.  Medical documents are intended to carry relevant information, facts as evident, and the clinical opinion of the practitioner.

## 2024-08-19 ENCOUNTER — HOSPITAL ENCOUNTER (OUTPATIENT)
Dept: PERINATAL CARE | Facility: HOSPITAL | Age: 29
Discharge: HOME | End: 2024-08-19
Attending: PEDIATRICS
Payer: COMMERCIAL

## 2024-08-19 ENCOUNTER — HOSPITAL ENCOUNTER (OUTPATIENT)
Dept: PERINATAL CARE | Facility: HOSPITAL | Age: 29
End: 2024-08-19
Attending: PEDIATRICS
Payer: COMMERCIAL

## 2024-08-19 DIAGNOSIS — O35.BXX0: Chronic | ICD-10-CM

## 2024-08-19 DIAGNOSIS — O09.299 HX OF PREECLAMPSIA, PRIOR PREGNANCY, CURRENTLY PREGNANT (HCC): Chronic | ICD-10-CM

## 2024-08-19 DIAGNOSIS — O99.210 OBESITY IN PREGNANCY (HCC): Chronic | ICD-10-CM

## 2024-08-19 DIAGNOSIS — O35.BXX0: Primary | Chronic | ICD-10-CM

## 2024-08-19 PROCEDURE — 76828 ECHO EXAM OF FETAL HEART: CPT

## 2024-08-19 PROCEDURE — 93325 DOPPLER ECHO COLOR FLOW MAPG: CPT

## 2024-08-19 PROCEDURE — 76826 ECHO EXAM OF FETAL HEART: CPT | Performed by: PEDIATRICS

## 2024-08-19 NOTE — PROGRESS NOTES
FOLLOW UP  CARDIOLOGY CONSULTATION AND FOLLOW UP FETAL ECHOCARDIOGRAM :    Dear Dr. Goodman,     Thank you for requesting a follow up fetal echocardiogram and follow up  cardiology  consultation on your patient Mary Chatman.  As you are aware she is a 28 year old female with a Payne pregnancy at 36w4d.     An initial   cardiology  consultation was requested secondary to suspected aortic arch abnormality by level 2 US.    Fetal echocardiogram obtained on 2024 showed subjectively mild isthmic narrowing with normal forward flow. Only two aortic arch branches were noted in today's scan. Wide separation between the arch branches. Otherwise limited unremarkable fetal echocardiogram. No major structural or functional abnormality was noted.     Additional high risk factors :  Maternal morbid obesity  History of preeclampsia.       Her prenatal records and labs were reviewed.  She was accompanied by her .        HISTORY  OB History  GravidaParaTermPretermABLiving  [id number]  SAB      IAB        Ectopic  Multiple Live Births            0           0           0           0           1                # 1 - Date: 19, Sex: Male, Weight: 8 lb 13.1 oz (4 kg), GA: 41w1d, Type: Caesarean Section, Apgar1: 8, Apgar5: 9, Living: Living, Birth Comments: Was on oxygen for TTN in [place]  Passed hearing test and cardiac screen  Received vitamin K, EES, hep B  No jaundice  Possible hypospadias     # 2 - Date: None, Sex: None, Weight: None, GA: None, Type: None, Apgar1: None, Apgar5: None, Living: None, Birth Comments: None     Past Medical History  The patient  has a past medical history of Asthma (HCC), Morbid obesity with BMI of 40.0-44.9, adult (HCC), PCOS (polycystic ovarian syndrome), Thyroid nodule, and Vitamin D deficiency.     Past Surgical History  The patient  has a past surgical history that includes tonsillectomy (); removal gallbladder (); dermatological  procedure; ; and each add tooth extraction.     Family History  The patient She indicated that her mother is alive. She indicated that her father is alive. She indicated that both of her sisters are alive. She indicated that her brother is alive. She indicated that her maternal grandmother is alive. She indicated that her maternal grandfather is alive. She indicated that her paternal grandmother is alive. She indicated that her paternal grandfather is alive. She indicated that her son is alive. She indicated that the status of her neg is unknown.        Medications:   Medications - Current  Current Outpatient Medications:     aspirin (ASPIRIN 81) 81 MG Oral Chew Tab, Chew 1.5 tablets (121.5 mg total) by mouth daily., Disp: 180 tablet, Rfl: 3    prenatal vitamin with DHA 27-0.8-228 MG Oral Cap, Take 1 capsule by mouth daily., Disp: , Rfl:      Allergies:   AllergiesExpand by Default  No Known Allergies     FETAL ECHOCARDIOGRAM :    Situs: situs solitus (normal). Cardiac position: levocardia (normal). Cardiac axis: normal. Cardiac size: normal (approx. 1/3 of thoracic area). Cardiac rhythm: regular (normal). Cardiac function: good contractility (normal). 4-chamber view: normal. LVOT view: normal. RVOT view: normal. 3-vessel view: normal. Long axis view: normal. Aortic arch view: Wide separation between the aortic arch branches. Two branches were noted. Ductal arch view: normal. Bicaval view: normal    AV connections: Normal connections. VA connections: Normal connections. IVC: Normal entrance into the right atrium. SVC: Normal entrance into the right atrium. Pulmonary veins: Two pulmonary veins entry into the left atrium. Right atrium: Normal in size. Left atrium: Normal in size. Atrial septum: foramen ovale in the central third/half, flap valve in LA. Foramen ovale: normal flow: right to left. Right ventricle: Normal sized RV with normal systolic functions. Left ventricle: Normal sized LV with normal  systolic functions. Ventricular septum: Visualized portions of the ventricular are intact  Tricuspid valve: normal size and morphology. Mitral valve: normal size and morphology. Pulmonary valve: normal size . Aortic valve: normal size. Cross-over gr. arteries: anterior great artery (confirmed to be the pulmonary artery by its branching) which crosses the course of the proximal aorta, indicative of normal relationship of the great arteries. Main PA: the main pulmonary artery can be seen bifurcating into the ductus arteriosus and the right pulmonary artery. Ascending aorta: normal size and morphology. Ductal arch: Left ductus arteriosus. Aortic arch: Wide separation between the aortic arch branches. Two branches were noted. Umbilical vein: normal. Umbilical arteries: normal  Echogenic focus: no. Linear insertion of AV valves: no. Pericardial effusion: no  Color Doppler (Qualitatively):   Pulm. veins inflow into LA: normal. Flow through foramen ovale: right-left shunt (normal). Tricuspid valve flow: normal. Mitral valve flow: normal. Ventricular septum: normal. RVOT / Pulmonary valve flow: normal. LVOT / Aortic valve flow: normal. Flow in pulmonary arteries: normal. Flow in ductus arteriosus: normal. Flow in aortic arch: normal. Flow in the umbilical vein: normal. Flow in the umbilical arteries: normal    Very limited follow up echocardiogram with suboptimal pictures. Wide separation between the arch branches. Otherwise limited unremarkable fetal echocardiogram. No major structural or functional abnormality was noted.    Fetal echocardiogram findings are described above.   Very limited follow up echocardiogram with suboptimal pictures. Wide separation between the arch branches. Otherwise limited unremarkable fetal echocardiogram. No major structural or functional abnormality was noted.     I had a lengthy discussion regarding my findings with the parents.   I answered their questions.   They seemed to have a good  understanding the issues we talked.     Small VSDs,minor valve problems,coronary artery anomalies,PAPVR and coarctation of the aorta cannot be excluded by fetal echocardiograms.      IMPRESSION :    29 years old G 2 L 1 IUP at 36w 4d  Normal fetal growth and activity  Possible narrow aortic arch noted at her level 2 US. No other anomalies were noted  Maternal morbid obesity  History of term preeclampsia  Follow up fetal echocardiogram :Very limited follow up echocardiogram with suboptimal pictures. Wide separation between the arch branches. Otherwise limited unremarkable fetal echocardiogram. No major structural or functional abnormality was noted.    RECOMMENDATIONS :    Routine Ob and MFM care   echocardiogram prior to discharge    Thank you for allowing me to participate in the care of this patient.  Please feel free to contact me with any questions.     Nkechi Armstrong MD  Fetal/Pediatric Cardiology     Total time spent 60 minutes this calendar day which includes preparing to see the patient including chart review, obtaining and/or reviewing additional medical history,documenting clinical information in the electronic medical record, independently interpreting results, counseling the patient, communicating results to the patient/family/caregiver and coordinating care.        Note to patient and family:  The 21st Century Cures Act makes medical notes available to patients in the interest of transparency.  However, please be advised that this is a medical document.  It is intended as a peer to peer communication.  It is written in medical language and may contain abbreviations or verbiage that are technical and unfamiliar.  It may appear blunt or direct.  Medical documents are intended to carry relevant information, facts as evident, and the clinical opinion of the practitioner

## 2024-08-22 ENCOUNTER — LAB ENCOUNTER (OUTPATIENT)
Dept: LAB | Age: 29
End: 2024-08-22
Attending: OBSTETRICS & GYNECOLOGY
Payer: COMMERCIAL

## 2024-08-22 ENCOUNTER — ROUTINE PRENATAL (OUTPATIENT)
Dept: OBGYN CLINIC | Facility: CLINIC | Age: 29
End: 2024-08-22
Payer: COMMERCIAL

## 2024-08-22 VITALS
BODY MASS INDEX: 42.04 KG/M2 | WEIGHT: 222.69 LBS | HEIGHT: 61 IN | DIASTOLIC BLOOD PRESSURE: 74 MMHG | SYSTOLIC BLOOD PRESSURE: 122 MMHG

## 2024-08-22 DIAGNOSIS — O09.93 SUPERVISION OF HIGH RISK PREGNANCY IN THIRD TRIMESTER (HCC): Primary | ICD-10-CM

## 2024-08-22 DIAGNOSIS — O09.93 SUPERVISION OF HIGH RISK PREGNANCY IN THIRD TRIMESTER (HCC): ICD-10-CM

## 2024-08-22 LAB — T PALLIDUM AB SER QL IA: NONREACTIVE

## 2024-08-22 PROCEDURE — 3078F DIAST BP <80 MM HG: CPT | Performed by: OBSTETRICS & GYNECOLOGY

## 2024-08-22 PROCEDURE — 87150 DNA/RNA AMPLIFIED PROBE: CPT | Performed by: OBSTETRICS & GYNECOLOGY

## 2024-08-22 PROCEDURE — 36415 COLL VENOUS BLD VENIPUNCTURE: CPT

## 2024-08-22 PROCEDURE — 59025 FETAL NON-STRESS TEST: CPT | Performed by: OBSTETRICS & GYNECOLOGY

## 2024-08-22 PROCEDURE — 87389 HIV-1 AG W/HIV-1&-2 AB AG IA: CPT

## 2024-08-22 PROCEDURE — 87081 CULTURE SCREEN ONLY: CPT | Performed by: OBSTETRICS & GYNECOLOGY

## 2024-08-22 PROCEDURE — 3008F BODY MASS INDEX DOCD: CPT | Performed by: OBSTETRICS & GYNECOLOGY

## 2024-08-22 PROCEDURE — 3074F SYST BP LT 130 MM HG: CPT | Performed by: OBSTETRICS & GYNECOLOGY

## 2024-08-22 PROCEDURE — 86780 TREPONEMA PALLIDUM: CPT

## 2024-08-22 NOTE — PROGRESS NOTES
Denies pain, bleeding, lof, positive fetal movement   Denies ha, vision changes, RUQ pain.    MFM ultrasound :   Single IUP in cephalic presentation.    Placenta is posterior.   A 3 vessel cord is noted.  Cardiac activity is present at 149 bpm  EFW 2754 g ( 6 lb 1 oz); 45%.    MAXIMILIANO is  7.6 cm.  MVP is 2.7 cm  29 year old  at 37w0d     Return OB  Pre- Care: UTD. GBS, HIV and trep ordered.   - NST reactive today   Previously elevated Bps, negative preeclampsia labs on  - likely chronic HTN. Recommend delivery by 39 weeks. Patient would like to TOLAC. Counseled and consent signed. Would schedule next visit after cervical exam.     - fetal echo showed some narrowing of aortic vessels,  [facility] recommendations:  F/u  with Peds Cardiology as scheduled for possible fetal cardiac abnormality.   Completed : Very limited follow up echocardiogram with suboptimal pictures. Wide separation between the arch branches. Otherwise limited unremarkable fetal echocardiogram. No major structural or functional abnormality was noted.   echocardiogram prior to discharge recommended.    Monthly growth & BPP ultrasound  Weekly NSTs at 34 weeks  Delivery 39-40 weeks  Daily low-dose aspirin (1.5-2 tablets daily)       Patient Active Problem List   Diagnosis    Vitamin D deficiency    Prediabetes    History of     BMI 40.0-44.9, adult ([facility])    Elevated blood pressure affecting pregnancy, antepartum ([facility])    Hx of preeclampsia, prior pregnancy, currently pregnant ([facility])    Obesity in pregnancy ([facility])    Abnormal fetal echocardiogram affecting antepartum care of mother ([facility])- normal fu echo on ; recommend  echocardiogram prior to discharge       - Return to clinic in: 1 wks    Dara Huggins MD

## 2024-08-23 LAB — GROUP B STREP BY PCR FOR PCR OVT: NEGATIVE

## 2024-08-28 ENCOUNTER — TELEPHONE (OUTPATIENT)
Dept: OBGYN CLINIC | Facility: CLINIC | Age: 29
End: 2024-08-28

## 2024-08-28 ENCOUNTER — ROUTINE PRENATAL (OUTPATIENT)
Dept: OBGYN CLINIC | Facility: CLINIC | Age: 29
End: 2024-08-28
Payer: COMMERCIAL

## 2024-08-28 VITALS
HEIGHT: 61 IN | BODY MASS INDEX: 42.48 KG/M2 | WEIGHT: 225 LBS | DIASTOLIC BLOOD PRESSURE: 70 MMHG | SYSTOLIC BLOOD PRESSURE: 110 MMHG

## 2024-08-28 PROCEDURE — 3008F BODY MASS INDEX DOCD: CPT | Performed by: OBSTETRICS & GYNECOLOGY

## 2024-08-28 PROCEDURE — 3078F DIAST BP <80 MM HG: CPT | Performed by: OBSTETRICS & GYNECOLOGY

## 2024-08-28 PROCEDURE — 59025 FETAL NON-STRESS TEST: CPT | Performed by: OBSTETRICS & GYNECOLOGY

## 2024-08-28 PROCEDURE — 3074F SYST BP LT 130 MM HG: CPT | Performed by: OBSTETRICS & GYNECOLOGY

## 2024-08-28 NOTE — TELEPHONE ENCOUNTER
Scheduled  8pm  ----- Message from Harleen Gabriel sent at 2024 11:36 AM CDT -----  Regarding: IOL  Please schedule this patient for IOL 2024 pm at 39 W with pitocin and possible balloon.   She has obesity and is CHTN (mild,no meds)  SHe has a hx of prev c/s for fetal distress. She was completely dilated at the time of c/s and would like to .   She is /50/-3 in the office today and I stripped her membranes.

## 2024-08-28 NOTE — PROGRESS NOTES
30 y/o  at 37 6/7 W gestation  NST reactive  Hx of preeclampsia  Mild chronic htn  Previous c/s for fetal distress (was completely dilated) and would like TOLAC  No HA, visual changes or RUQ pain  BP stable  SVE 3 and cephalic. Membranes stripped.   Plan IOL 2024 with pitocin and balloon.   Dw pt the risk.

## 2024-08-30 ENCOUNTER — ANESTHESIA EVENT (OUTPATIENT)
Dept: OBGYN UNIT | Facility: HOSPITAL | Age: 29
End: 2024-08-30
Payer: COMMERCIAL

## 2024-08-30 ENCOUNTER — HOSPITAL ENCOUNTER (INPATIENT)
Facility: HOSPITAL | Age: 29
LOS: 2 days | Discharge: HOME | End: 2024-09-01
Attending: OBSTETRICS & GYNECOLOGY | Admitting: OBSTETRICS & GYNECOLOGY
Payer: COMMERCIAL

## 2024-08-30 ENCOUNTER — ANESTHESIA (OUTPATIENT)
Dept: OBGYN UNIT | Facility: HOSPITAL | Age: 29
End: 2024-08-30
Payer: COMMERCIAL

## 2024-08-30 PROBLEM — Z34.90 PREGNANT (HCC): Status: ACTIVE | Noted: 2024-08-30

## 2024-08-30 LAB
ALBUMIN SERPL-MCNC: 4.3 G/DL (ref 3.2–4.8)
ALBUMIN/GLOB SERPL: 1.2 {RATIO} (ref 1–2)
ALP LIVER SERPL-CCNC: 140 U/L
ALT SERPL-CCNC: 22 U/L
ANION GAP SERPL CALC-SCNC: 10 MMOL/L (ref 0–18)
ANTIBODY SCREEN: NEGATIVE
AST SERPL-CCNC: 23 U/L (ref ?–34)
BASOPHILS # BLD AUTO: 0.08 X10(3) UL (ref 0–0.2)
BASOPHILS NFR BLD AUTO: 0.6 %
BILIRUB SERPL-MCNC: 0.6 MG/DL (ref 0.3–1.2)
BUN BLD-MCNC: 10 MG/DL (ref 9–23)
BUN/CREAT SERPL: 15.9 (ref 10–20)
CALCIUM BLD-MCNC: 9.4 MG/DL (ref 8.7–10.4)
CHLORIDE SERPL-SCNC: 105 MMOL/L (ref 98–112)
CO2 SERPL-SCNC: 21 MMOL/L (ref 21–32)
CREAT BLD-MCNC: 0.63 MG/DL
CREAT UR-SCNC: 127.4 MG/DL
DEPRECATED RDW RBC AUTO: 45.2 FL (ref 35.1–46.3)
EGFRCR SERPLBLD CKD-EPI 2021: 123 ML/MIN/1.73M2 (ref 60–?)
EOSINOPHIL # BLD AUTO: 0.1 X10(3) UL (ref 0–0.7)
EOSINOPHIL NFR BLD AUTO: 0.7 %
ERYTHROCYTE [DISTWIDTH] IN BLOOD BY AUTOMATED COUNT: 13.3 % (ref 11–15)
GLOBULIN PLAS-MCNC: 3.6 G/DL (ref 2–3.5)
GLUCOSE BLD-MCNC: 93 MG/DL (ref 70–99)
HCT VFR BLD AUTO: 39.5 %
HGB BLD-MCNC: 13.5 G/DL
IMM GRANULOCYTES # BLD AUTO: 0.05 X10(3) UL (ref 0–1)
IMM GRANULOCYTES NFR BLD: 0.4 %
LYMPHOCYTES # BLD AUTO: 1.7 X10(3) UL (ref 1–4)
LYMPHOCYTES NFR BLD AUTO: 12.3 %
MCH RBC QN AUTO: 31.8 PG (ref 26–34)
MCHC RBC AUTO-ENTMCNC: 34.2 G/DL (ref 31–37)
MCV RBC AUTO: 93.2 FL
MONOCYTES # BLD AUTO: 0.75 X10(3) UL (ref 0.1–1)
MONOCYTES NFR BLD AUTO: 5.4 %
NEUTROPHILS # BLD AUTO: 11.16 X10 (3) UL (ref 1.5–7.7)
NEUTROPHILS # BLD AUTO: 11.16 X10(3) UL (ref 1.5–7.7)
NEUTROPHILS NFR BLD AUTO: 80.6 %
OSMOLALITY SERPL CALC.SUM OF ELEC: 281 MOSM/KG (ref 275–295)
PLATELET # BLD AUTO: 219 10(3)UL (ref 150–450)
PLATELETS.RETICULATED NFR BLD AUTO: 21.8 % (ref 0–7)
POTASSIUM SERPL-SCNC: 3.9 MMOL/L (ref 3.5–5.1)
PROT SERPL-MCNC: 7.9 G/DL (ref 5.7–8.2)
PROT UR-MCNC: 44.2 MG/DL (ref ?–14)
PROT/CREAT UR-RTO: 0.35
RBC # BLD AUTO: 4.24 X10(6)UL
RH BLOOD TYPE: POSITIVE
SODIUM SERPL-SCNC: 136 MMOL/L (ref 136–145)
T PALLIDUM AB SER QL IA: NONREACTIVE
WBC # BLD AUTO: 13.8 X10(3) UL (ref 4–11)

## 2024-08-30 PROCEDURE — 10907ZC DRAINAGE OF AMNIOTIC FLUID, THERAPEUTIC FROM PRODUCTS OF CONCEPTION, VIA NATURAL OR ARTIFICIAL OPENING: ICD-10-PCS | Performed by: OBSTETRICS & GYNECOLOGY

## 2024-08-30 PROCEDURE — 0KQM0ZZ REPAIR PERINEUM MUSCLE, OPEN APPROACH: ICD-10-PCS | Performed by: OBSTETRICS & GYNECOLOGY

## 2024-08-30 RX ORDER — ONDANSETRON 2 MG/ML
4 INJECTION INTRAMUSCULAR; INTRAVENOUS EVERY 6 HOURS PRN
Status: DISCONTINUED | OUTPATIENT
Start: 2024-08-30 | End: 2024-08-30 | Stop reason: HOSPADM

## 2024-08-30 RX ORDER — CALCIUM CARBONATE 500 MG/1
1000 TABLET, CHEWABLE ORAL EVERY 4 HOURS PRN
Status: DISCONTINUED | OUTPATIENT
Start: 2024-08-30 | End: 2024-08-30 | Stop reason: HOSPADM

## 2024-08-30 RX ORDER — MISOPROSTOL 200 UG/1
TABLET ORAL
Status: DISCONTINUED
Start: 2024-08-30 | End: 2024-08-30

## 2024-08-30 RX ORDER — LIDOCAINE HYDROCHLORIDE 10 MG/ML
30 INJECTION, SOLUTION EPIDURAL; INFILTRATION; INTRACAUDAL; PERINEURAL ONCE
Status: DISCONTINUED | OUTPATIENT
Start: 2024-08-30 | End: 2024-08-30 | Stop reason: HOSPADM

## 2024-08-30 RX ORDER — ACETAMINOPHEN 500 MG
1000 TABLET ORAL EVERY 6 HOURS PRN
Status: DISCONTINUED | OUTPATIENT
Start: 2024-08-30 | End: 2024-09-01

## 2024-08-30 RX ORDER — BISACODYL 10 MG
10 SUPPOSITORY, RECTAL RECTAL ONCE AS NEEDED
Status: DISCONTINUED | OUTPATIENT
Start: 2024-08-30 | End: 2024-09-01

## 2024-08-30 RX ORDER — CITRIC ACID/SODIUM CITRATE 334-500MG
30 SOLUTION, ORAL ORAL AS NEEDED
Status: DISCONTINUED | OUTPATIENT
Start: 2024-08-30 | End: 2024-08-30 | Stop reason: HOSPADM

## 2024-08-30 RX ORDER — ACETAMINOPHEN 500 MG
500 TABLET ORAL EVERY 6 HOURS PRN
Status: DISCONTINUED | OUTPATIENT
Start: 2024-08-30 | End: 2024-08-30 | Stop reason: HOSPADM

## 2024-08-30 RX ORDER — IBUPROFEN 600 MG/1
600 TABLET, FILM COATED ORAL ONCE AS NEEDED
Status: DISCONTINUED | OUTPATIENT
Start: 2024-08-30 | End: 2024-08-30 | Stop reason: HOSPADM

## 2024-08-30 RX ORDER — ACETAMINOPHEN 500 MG
500 TABLET ORAL EVERY 6 HOURS PRN
Status: DISCONTINUED | OUTPATIENT
Start: 2024-08-30 | End: 2024-09-01

## 2024-08-30 RX ORDER — BUPIVACAINE HCL/0.9 % NACL/PF 0.25 %
5 PLASTIC BAG, INJECTION (ML) EPIDURAL AS NEEDED
Status: DISCONTINUED | OUTPATIENT
Start: 2024-08-30 | End: 2024-09-01

## 2024-08-30 RX ORDER — SODIUM CHLORIDE, SODIUM LACTATE, POTASSIUM CHLORIDE, CALCIUM CHLORIDE 600; 310; 30; 20 MG/100ML; MG/100ML; MG/100ML; MG/100ML
INJECTION, SOLUTION INTRAVENOUS CONTINUOUS
Status: DISCONTINUED | OUTPATIENT
Start: 2024-08-30 | End: 2024-08-30 | Stop reason: HOSPADM

## 2024-08-30 RX ORDER — DEXTROSE, SODIUM CHLORIDE, SODIUM LACTATE, POTASSIUM CHLORIDE, AND CALCIUM CHLORIDE 5; .6; .31; .03; .02 G/100ML; G/100ML; G/100ML; G/100ML; G/100ML
INJECTION, SOLUTION INTRAVENOUS AS NEEDED
Status: DISCONTINUED | OUTPATIENT
Start: 2024-08-30 | End: 2024-08-30 | Stop reason: HOSPADM

## 2024-08-30 RX ORDER — NALBUPHINE HYDROCHLORIDE 10 MG/ML
2.5 INJECTION, SOLUTION INTRAMUSCULAR; INTRAVENOUS; SUBCUTANEOUS
Status: DISCONTINUED | OUTPATIENT
Start: 2024-08-30 | End: 2024-09-01

## 2024-08-30 RX ORDER — IBUPROFEN 600 MG/1
600 TABLET, FILM COATED ORAL EVERY 6 HOURS
Status: DISCONTINUED | OUTPATIENT
Start: 2024-08-30 | End: 2024-09-01

## 2024-08-30 RX ORDER — BUPIVACAINE HYDROCHLORIDE 2.5 MG/ML
20 INJECTION, SOLUTION EPIDURAL; INFILTRATION; INTRACAUDAL ONCE
Status: DISCONTINUED | OUTPATIENT
Start: 2024-08-30 | End: 2024-08-30 | Stop reason: HOSPADM

## 2024-08-30 RX ORDER — ONDANSETRON 2 MG/ML
4 INJECTION INTRAMUSCULAR; INTRAVENOUS EVERY 6 HOURS PRN
Status: DISCONTINUED | OUTPATIENT
Start: 2024-08-30 | End: 2024-09-01

## 2024-08-30 RX ORDER — ACETAMINOPHEN 500 MG
1000 TABLET ORAL EVERY 6 HOURS PRN
Status: DISCONTINUED | OUTPATIENT
Start: 2024-08-30 | End: 2024-08-30 | Stop reason: HOSPADM

## 2024-08-30 RX ORDER — DOCUSATE SODIUM 100 MG/1
100 CAPSULE, LIQUID FILLED ORAL
Status: DISCONTINUED | OUTPATIENT
Start: 2024-08-30 | End: 2024-09-01

## 2024-08-30 RX ORDER — SIMETHICONE 80 MG
80 TABLET,CHEWABLE ORAL 3 TIMES DAILY PRN
Status: DISCONTINUED | OUTPATIENT
Start: 2024-08-30 | End: 2024-09-01

## 2024-08-30 RX ORDER — LIDOCAINE HYDROCHLORIDE AND EPINEPHRINE 15; 5 MG/ML; UG/ML
INJECTION, SOLUTION EPIDURAL AS NEEDED
Status: DISCONTINUED | OUTPATIENT
Start: 2024-08-30 | End: 2024-08-30 | Stop reason: SURG

## 2024-08-30 RX ORDER — MISOPROSTOL 200 UG/1
200 TABLET ORAL ONCE
Status: DISCONTINUED | OUTPATIENT
Start: 2024-08-30 | End: 2024-08-30

## 2024-08-30 RX ORDER — TERBUTALINE SULFATE 1 MG/ML
0.25 INJECTION, SOLUTION SUBCUTANEOUS AS NEEDED
Status: DISCONTINUED | OUTPATIENT
Start: 2024-08-30 | End: 2024-08-30 | Stop reason: HOSPADM

## 2024-08-30 RX ORDER — AMMONIA INHALANTS 0.04 G/.3ML
0.3 INHALANT RESPIRATORY (INHALATION) AS NEEDED
Status: DISCONTINUED | OUTPATIENT
Start: 2024-08-30 | End: 2024-09-01

## 2024-08-30 RX ORDER — TRANEXAMIC ACID 10 MG/ML
INJECTION, SOLUTION INTRAVENOUS
Status: COMPLETED
Start: 2024-08-30 | End: 2024-08-30

## 2024-08-30 RX ORDER — MISOPROSTOL 200 UG/1
1000 TABLET ORAL ONCE
Status: COMPLETED | OUTPATIENT
Start: 2024-08-30 | End: 2024-08-30

## 2024-08-30 RX ORDER — LIDOCAINE HYDROCHLORIDE 10 MG/ML
INJECTION, SOLUTION EPIDURAL; INFILTRATION; INTRACAUDAL; PERINEURAL AS NEEDED
Status: DISCONTINUED | OUTPATIENT
Start: 2024-08-30 | End: 2024-08-30 | Stop reason: SURG

## 2024-08-30 RX ORDER — BENZOCAINE/MENTHOL 6 MG-10 MG
1 LOZENGE MUCOUS MEMBRANE EVERY 6 HOURS PRN
Status: DISCONTINUED | OUTPATIENT
Start: 2024-08-30 | End: 2024-09-01

## 2024-08-30 RX ADMIN — LIDOCAINE HYDROCHLORIDE 5 ML: 10 INJECTION, SOLUTION EPIDURAL; INFILTRATION; INTRACAUDAL; PERINEURAL at 04:16:00

## 2024-08-30 RX ADMIN — LIDOCAINE HYDROCHLORIDE AND EPINEPHRINE 3 ML: 15; 5 INJECTION, SOLUTION EPIDURAL at 04:24:00

## 2024-08-30 NOTE — ANESTHESIA PREPROCEDURE EVALUATION
Anesthesia PreOp Note    HPI:     Mary Chatman is a 29 year old female who presents for preoperative consultation requested by: * No surgeons listed *    Date of Surgery: 2024    * No procedures listed *  Indication: * No pre-op diagnosis entered *    Relevant Problems   No relevant active problems       NPO:                         History Review:  Patient Active Problem List    Diagnosis Date Noted    Pregnant ([facility]) 2024    Abnormal fetal echocardiogram affecting antepartum care of mother ([facility])- normal fu echo on ; recommend  echocardiogram prior to discharge 2024    Hx of preeclampsia, prior pregnancy, currently pregnant ([facility]) 2024    Obesity in pregnancy ([facility]) 2024    Elevated blood pressure affecting pregnancy, antepartum ([facility]) 2024    History of  2024    BMI 40.0-44.9, adult ([facility]) 2024    Prediabetes 2020    Vitamin D deficiency 2016       Past Medical History:    Asthma ([facility])    Morbid obesity with BMI of 40.0-44.9, adult ([facility])    PCOS (polycystic ovarian syndrome)    Pregnancy-induced hypertension ([facility])    Thyroid nodule    Vitamin D deficiency       Past Surgical History:   Procedure Laterality Date          Dermatological procedure      Facial abscess removed    Each add tooth extraction      no associated bleeding complications    Removal gallbladder  2010    Tonsillectomy         Medications Prior to Admission   Medication Sig Dispense Refill Last Dose    aspirin (ASPIRIN 81) 81 MG Oral Chew Tab Chew 1.5 tablets (121.5 mg total) by mouth daily. 180 tablet 3 2024    prenatal vitamin with DHA 27-0.8-228 MG Oral Cap Take 1 capsule by mouth daily.   2024     Current Facility-Administered Medications Ordered in Epic   Medication Dose Route Frequency Provider Last Rate Last Admin    acetaminophen (Tylenol Extra Strength) tab 500 mg  500 mg Oral Q6H PRN Harleen Gabriel MD        acetaminophen  (Tylenol Extra Strength) tab 1,000 mg  1,000 mg Oral Q6H PRN Harleen Gabriel MD        ibuprofen (Motrin) tab 600 mg  600 mg Oral Once PRN Harleen Gabriel MD        ondansetron (Zofran) 4 MG/2ML injection 4 mg  4 mg Intravenous Q6H PRN Harleen Gabriel MD        oxyTOCIN in sodium chloride 0.9% (Pitocin) 30 Units/500mL infusion premix  62.5-900 fran-units/min Intravenous Continuous Harleen Gabriel MD        terbutaline (Brethine) 1 MG/ML injection 0.25 mg  0.25 mg Subcutaneous PRN Harleen Gabriel MD        sodium citrate-citric acid (Bicitra) 500-334 MG/5ML oral solution 30 mL  30 mL Oral PRN Harleen Gabriel MD        lidocaine PF (Xylocaine-MPF) 1% injection  30 mL Intradermal Once Harleen Gabriel MD        lactated ringers infusion   Intravenous Continuous Harleen Gabriel  mL/hr at 08/30/24 0200 New Bag at 08/30/24 0200    dextrose in lactated ringers 5% infusion   Intravenous PRN Harleen Gabriel MD        lactated ringers IV bolus 500 mL  500 mL Intravenous PRN Harleen Gabriel MD        fentaNYL (Sublimaze) 50 mcg/mL injection 100 mcg  100 mcg Intravenous Once PRN Harleen Gabriel MD        fentaNYL (Sublimaze) 50 mcg/mL injection 50 mcg  50 mcg Intravenous Q30 Min PRN Harleen Gabriel MD        calcium carbonate (Tums) chewable tab 1,000 mg  1,000 mg Oral Q4H PRN Harleen Gabriel MD        lactated ringers IV bolus 1,000 mL  1,000 mL Intravenous Once Kurt Templeton MD 2,000 mL/hr at 08/30/24 0321 Rate Change at 08/30/24 0321    fentaNYL-bupivacaine 2 mcg/mL-0.125% in sodium chloride 0.9% 100 mL EPIDURAL infusion premix   Epidural Continuous Kurt Templeton MD        fentaNYL (Sublimaze) 50 mcg/mL injection 100 mcg  100 mcg Epidural Once Kurt Templeton MD        bupivacaine PF (Marcaine) 0.25% injection  20 mL Epidural Once Kurt Templeton,  MD        EPHEDrine (PF) 25 MG/5 ML injection 5 mg  5 mg Intravenous PRN Kurt Templeton MD        nalbuphine (Nubain) 10 mg/mL injection 2.5 mg  2.5 mg Intravenous Q15 Min PRN Kurt Templeton MD         No current [facility]-ordered outpatient medications on file.       No Known Allergies    Family History   Problem Relation Age of Onset    No Known Problems Father     Thyroid disease Mother     Other (epilepsy) Son 2    Other (Other) Son     Seizure Disorder Son     Thyroid disease Maternal Grandmother     No Known Problems Maternal Grandfather     Diabetes Paternal Grandmother     Cancer Paternal Grandmother     Diabetes Paternal Grandfather     No Known Problems Sister     No Known Problems Sister     No Known Problems Brother     Heart Disorder Neg     Hypertension Neg     Anemia Neg     Bleeding Disorders Neg     Clotting Disorder Neg      Social History     Socioeconomic History    Marital status:    Tobacco Use    Smoking status: Never    Smokeless tobacco: Never   Vaping Use    Vaping status: Never Used   Substance and Sexual Activity    Alcohol use: No     Alcohol/week: 0.0 standard drinks of alcohol    Drug use: No    Sexual activity: Yes     Partners: Male     Birth control/protection: Condom   Other Topics Concern    Caffeine Concern Yes     Comment: 1 cup soda/coffee per day       Available pre-op labs reviewed.  Lab Results   Component Value Date    WBC 13.8 (H) 08/30/2024    RBC 4.24 08/30/2024    HGB 13.5 08/30/2024    HCT 39.5 08/30/2024    MCV 93.2 08/30/2024    MCH 31.8 08/30/2024    MCHC 34.2 08/30/2024    RDW 13.3 08/30/2024    .0 08/30/2024     Lab Results   Component Value Date     08/30/2024    K 3.9 08/30/2024     08/30/2024    CO2 21.0 08/30/2024    BUN 10 08/30/2024    CREATSERUM 0.63 08/30/2024    GLU 93 08/30/2024    CA 9.4 08/30/2024          Vital Signs:  Body mass index is 42.51 kg/m².   weight is 102.1 kg (225 lb). Her oral temperature is 98 °F (36.7 °C).  Her blood pressure is 154/81 and her pulse is 67.   Vitals:    08/30/24 0113 08/30/24 0239   BP:  154/81   Pulse:  67   Temp:  98 °F (36.7 °C)   TempSrc:  Oral   Weight: 102.1 kg (225 lb)         Anesthesia Evaluation      Airway   Mallampati: I  TM distance: >3 FB  Neck ROM: full  Dental      Pulmonary - normal exam   (+) asthma  Cardiovascular - normal exam  (+) hypertension    Neuro/Psych      GI/Hepatic/Renal      Endo/Other    Abdominal   (+) obese                 Anesthesia Plan:   ASA:  2  Emergent    Plan:   Epidural  Informed Consent Plan and Risks Discussed With:  Patient      I have informed Marylynette PlascenciaCompa and/or legal guardian or family member of the nature of the anesthetic plan, benefits, risks including possible dental damage if relevant, major complications, and any alternative forms of anesthetic management.   All of the patient's questions were answered to the best of my ability. The patient desires the anesthetic management as planned.  SHERRY CARRILLO MD  8/30/2024 4:29 AM  Present on Admission:  **None**

## 2024-08-30 NOTE — PROGRESS NOTES
Labor Progress Note  Subjective:   Patient comfortable with epidural. Starting to feel more pelvic pressure.     Objective:   Vitals:    24 0800   BP: 134/80   Pulse: 81   Resp:    Temp:         SVE: 9/100/0 per RN at 0709.      FHR: Baseline 140 BPM, Moderate variability, + accelerations, rare variable decelerations   [place]: contractions Q 2-5 min     Assessment and plan:   Patient is a 29 year old  at 38w1d here for Vaginal trial of labor (VTOL) with History of  section X 1.      VTOL    -SVE as above.    -SVE in 1-2 hours. Expect to be complete and will start pushing.      Plan of care discussed with the patient.     Dr. Georges Blue MD    [facility]     This note was created by [employer] voice recognition. Errors in content may be related to improper recognition by the system; efforts to review and correct have been done but errors may still exist. Please be advised the primary purpose of this note is for me to communicate medical care. Standard sentence structure is not always used. Medical terminology and medical abbreviations may be used. There may be grammatical, typographical, and automated fill ins that may have errors missed in proofreading.

## 2024-08-30 NOTE — PROGRESS NOTES
Pt is a 29 year old female admitted to LDR5/LDR5-A.     Chief Complaint   Patient presents with    R/o Labor     +CTX q2-3min x2hr; +FM; denies LOF; +minimal VB      Pt is  38w1d intra-uterine pregnancy.  History obtained, consents signed. Oriented to room, staff, and plan of care.

## 2024-08-30 NOTE — ANESTHESIA POSTPROCEDURE EVALUATION
Patient: Mary Chatman    Procedure Summary       Date: 08/30/24 Room / Location:     Anesthesia Start: 0415 Anesthesia Stop: 1129    Procedure: LABOR ANALGESIA Diagnosis:     Scheduled Providers:  Anesthesiologist: Filipe Bruno MD    Anesthesia Type: epidural ASA Status: 2 - Emergent            Anesthesia Type: epidural    Vitals Value Taken Time   /62 08/30/24 1146   Temp 36.8 08/30/24 1150   Pulse 86 08/30/24 1150   Resp 14 08/30/24 1150   SpO2 99 % 08/30/24 1150   Vitals shown include unfiled device data.    EM AN Post Evaluation:   Patient Evaluated in floor  Patient Participation: complete - patient participated  Level of Consciousness: awake and alert  Pain Score: 0  Pain Management: adequate  Airway Patency:patent  Yes    Cardiovascular Status: acceptable and stable  Respiratory Status: acceptable  Postoperative Hydration acceptable      Filipe Bruno MD  8/30/2024 11:50 AM

## 2024-08-30 NOTE — PLAN OF CARE
Problem: POSTPARTUM  Goal: Long Term Goal:Experiences normal postpartum course  Description: INTERVENTIONS:  - Assess and monitor vital signs and lab values.  - Assess fundus and lochia.  - Provide ice/sitz baths for perineum discomfort.  - Monitor healing of incision/episiotomy/laceration, and assess for signs and symptoms of infection and hematoma.  - Assess bladder function and monitor for bladder distention.  - Provide/instruct/assist with pericare as needed.  - Provide VTE prophylaxis as needed.  - Monitor bowel function.  - Encourage ambulation and provide assistance as needed.  - Assess and monitor emotional status and provide social service/psych resources as needed.  - Utilize standard precautions and use personal protective equipment as indicated. Ensure aseptic care of all intravenous lines and invasive tubes/drains.  - Obtain immunization and exposure to communicable diseases history.  Outcome: Progressing  Goal: Optimize infant feeding at the breast  Description: INTERVENTIONS:  - Initiate breast feeding within first hour after birth.   - Monitor effectiveness of current breast feeding efforts.  - Assess support systems available to mother/family.  - Identify cultural beliefs/practices regarding lactation, letdown techniques, maternal food preferences.  - Assess mother's knowledge and previous experience with breast feeding.  - Provide information as needed about early infant feeding cues (e.g., rooting, lip smacking, sucking fingers/hand) versus late cue of crying.  - Discuss/demonstrate breast feeding aids (e.g., infant sling, nursing footstool/pillows, and breast pumps).  - Encourage mother/other family members to express feelings/concerns, and actively listen.  - Educate father/SO about benefits of breast feeding and how to manage common lactation challenges.  - Recommend avoidance of specific medications or substances incompatible with breast feeding.  - Assess and monitor for signs of nipple  pain/trauma.  - Instruct and provide assistance with proper latch.  - Review techniques for milk expression (breast pumping) and storage of breast milk. Provide pumping equipment/supplies, instructions and assistance, as needed.  - Encourage rooming-in and breast feeding on demand.  - Encourage skin-to-skin contact.  - Provide LC support as needed.  - Assess for and manage engorgement.  - Provide breast feeding education handouts and information on community breast feeding support.   Outcome: Progressing  Goal: Establishment of adequate milk supply with medication/procedure interruptions  Description: INTERVENTIONS:  - Review techniques for milk expression (breast pumping).   - Provide pumping equipment/supplies, instructions, and assistance until it is safe to breastfeed infant.  Outcome: Progressing  Goal: Experiences normal breast weaning course  Description: INTERVENTIONS:  - Assess for and manage engorgement.  - Instruct on breast care.  - Provide comfort measures.  Outcome: Progressing  Goal: Appropriate maternal -  bonding  Description: INTERVENTIONS:  - Assess caregiver- interactions.  - Assess caregiver's emotional status and coping mechanisms.  - Encourage caregiver to participate in  daily care.  - Assess support systems available to mother/family.  - Provide /case management support as needed.  Outcome: Progressing     Problem: PAIN - ADULT  Goal: Verbalizes/displays adequate comfort level or patient's stated pain goal  Description: INTERVENTIONS:  - Encourage pt to monitor pain and request assistance  - Assess pain using appropriate pain scale  - Administer analgesics based on type and severity of pain and evaluate response  - Implement non-pharmacological measures as appropriate and evaluate response  - Consider cultural and social influences on pain and pain management  - Manage/alleviate anxiety  - Utilize distraction and/or relaxation techniques  - Monitor for  opioid side effects  - Notify MD/LIP if interventions unsuccessful or patient reports new pain  - Anticipate increased pain with activity and pre-medicate as appropriate  Outcome: Progressing     Received pt via wheelchair to room 349. Bedside report received from POLO Dozier. Pt transferred to bed without incident. Bed locked and in low position, side rails up x2. VSS. Baby Girl present at bedside in open crib. ID bands verified. Pt and family oriented to room, unit and call light. Call light within pt reach. Pt instructed to call for assistance and not get out of bed without assistance . Pt verbalized understanding.

## 2024-08-30 NOTE — L&D DELIVERY NOTE
Compa Girl [[id number]]      Labor Events     labor?: No   steroids?: None  Antibiotics received during labor?: No  Rupture date/time: 2024 0257     Rupture type: AROM  Fluid color: Clear  Labor type: Spontaneous Onset of Labor  Augmentation: AROM  Indications for augmentation: Ineffective Contraction Pattern  Intrapartum & labor complications: None       Labor Length    1st stage: 8h 58m  2nd stage: 1h 19m  3rd stage: 0h 03m       Labor Event Times    Labor onset date/time: 2024 0108  Dilation complete date/time: 2024 1006  Start pushing date/time: 2024 1101        Presentation    Presentation: Vertex  Position: Right Occiput Anterior       Operative Delivery    Operative Vaginal Delivery: No                Shoulder Dystocia    Shoulder Dystocia: No       Anesthesia    Method: Epidural              Muse Delivery      Head delivery date/time: 2024 11:25:46   Delivery date/time:  24 11:25:46   Delivery type: Vaginal birth after caesarian    Details:     Delivery location: delivery room  Delivery Room Temperature: 74       Delivery Providers    Delivering Clinician: Georges Blue MD   Delivery personnel:  Provider Role   Anay Brody RN Baby Nurse   Angélica Rivera RN Delivery Nurse             Cord    Vessels: 3 Vessels  Complications: Nuchal  # of loops: 1  Timed cord clamping: Yes  Time in sec: 30  Cord blood disposition: to lab  Gases sent?: No       Resuscitation    Method: None       Muse Measurements      Weight: 2990 g 6 lb 9.5 oz Length: 49.5 cm     Head circum.: 34 cm              Placenta    Date/time: 2024 1129  Removal: Spontaneous  Appearance: Intact  Disposition: Discarded       Apgars    Living status: Living   Apgar Scoring Key:    0 1 2    Skin color Blue or pale Acrocyanotic Completely pink    Heart rate Absent <100 bpm >100 bpm    Reflex irritability No response Grimace Cry or active withdrawal    Muscle tone Limp Some  flexion Active motion    Respiratory effort Absent Weak cry; hypoventilation Good, crying              1 Minute:  5 Minute:  10 Minute:  15 Minute:  20 Minute:      Skin color: 1  1       Heart rate: 2  2       Reflex irritablity: 2  2       Muscle tone: 2  2       Respiratory effort: 2  2       Total: 9  9          Apgars assigned by: CHARAN HOANG RN  [place] disposition: with mother       Skin to Skin    Skin to skin initiated date/time: 2024 1125  Skin to skin with: Mother       Vaginal Count    Initial count RN: Angélica Rivera RN  Initial count Tech: Gil, Helene   Sponges   Sharps    Initial counts 10   0    Final counts 30   2    Final count RN: Angélica Rivera, POLO  Final count MD: Georges Blue MD       Lacerations    Episiotomy: None  Perineal lacerations: 2nd Repaired?: Yes     Vaginal laceration?: Yes Repaired?: Yes     Cervical laceration?: No    Clitoral laceration?: No    Quantitative blood loss (mL): 480              [facility]   Vaginal Delivery Procedure Note      Mary Chatman Patient Status:  Inpatient    8/15/1995 MRN [id number]   Location [facility] Attending Harleen Gabriel*   Hosp Day # 0 PCP John Polanco MD     Date of Delivery: 24    Pre procedure diagnosis:  IUP at 38w1d, history of  section X 1, Normal labor.     Post procedure diagnosis: Same, delivered    Procedure: Vaginal Birth After  Section ()     Attending: Dr. Georges Blue MD      Anesthesia: Epidural  EBL:  500 cc  QBL: 480 ml     Indications:  Patient is a 29 year old  at 38w1d who presented in active labor, she progressed to complete cervical dilation spontaneously.     Findings:    Sex: female     Weight:2990 g      Apgars: 9/9      Lacerations: 2nd degree perineal laceration, right vaginal laceration, no periurethral, no cervical. Laceration bed oozing requiring vaginal packing.       Procedure:  The patient was confirmed to be  completely dilated. The patients was placed in the dorsolithotomy position.  She was then encouraged to push.  As the head was delivered in PASQUALE position with nuchal cord X 1 tight not able to be reduce, the perineum was supported to decrease the risk of tearing. The shoulders rotated spontaneously and delivery was completed without complication.  The cord was doubly clamped then cut after 30 seconds.  The baby was placed on the mother's abdomen at her request.  IV pitocin bolus was initiated. The cord blood was sampled. The placenta then delivered intact. The uterus was noted to be firm. Good hemostasis was noted. The perineum, vaginal mucosa and cervix was then examined.      A 2nd degree perineal laceration repaired with 2-0 vicryl. A right vaginal laceration was repaired with 3-0 vicryl. Small bleeding noted from the second degree perineal laceration bed. Moist with water and lubrication vaginal packing placed.  Bleeding was minimal. Urinary meek catheter placed. The patient was then moved to the supine position in stable condition.  Sponge, needle, and instrument counts were correct.    Complications:  None    Mother and infant in good condition in the delivery room.    Dr. Georges Blue MD    [facility]     This note was created by [email or web address] voice recognition. Errors in content may be related to improper recognition by the system; efforts to review and correct have been done but errors may still exist. Please be advised the primary purpose of this note is for me to communicate medical care. Standard sentence structure is not always used. Medical terminology and medical abbreviations may be used. There may be grammatical, typographical, and automated fill ins that may have errors missed in proofreading.

## 2024-08-30 NOTE — ANESTHESIA PROCEDURE NOTES
Labor Analgesia    Date/Time: 8/30/2024 4:15 AM    Performed by: Kurt Templeton MD  Authorized by: Kurt Templeton MD      General Information and Staff    Start Time:  8/30/2024 4:15 AM  End Time:  8/30/2024 4:28 AM  Anesthesiologist:  Kurt Templeton MD  Performed by:  Anesthesiologist  Patient Location:  OB  Site Identification: surface landmarks    Reason for Block: labor epidural    Preanesthetic Checklist: patient identified, IV checked, site marked, risks and benefits discussed, monitors and equipment checked, pre-op evaluation, timeout performed, anesthesia consent and sterile technique used      Procedure Details    Patient Position:  Sitting  Prep: ChloraPrep    Monitoring:  Heart rate  Approach:  Midline    Epidural Needle    Injection Technique:  KOKI air  Needle Type:  Tuohy  Needle Gauge:  18 G  Needle Length:  3.5 in  Needle Insertion Depth:  6  Location:  L2-3    Spinal Needle      Catheter    Catheter Type:  Multi-orifice  Catheter Size:  20 G  Catheter at Skin Depth:  12  Test Dose:  Negative    Assessment    Sensory Level:  T10    Additional Comments

## 2024-08-30 NOTE — H&P
GYN H&P     2024  3:06 AM    CC: Patient is here for painful contractions    HPI: Patient is a 29 year old  @ 38 1/ W presents with painful contractions.     Pregnancy c/b:  1.) Previous c/s - desires TOLAC  2.) obesity  3.) Hx of preeclampsia with previous pregnancy  4.) abnormal fetal echo: possible narrow aortic arch. Recommend FU echo after delivery.          Patient's last menstrual period was 2023 (approximate).    OB History    Para Term  AB Living   2 1 1 0 0 1   SAB IAB Ectopic Multiple Live Births   0 0 0 0 1      # Outcome Date GA Lbr [place]/2nd Weight Sex Type Anes PTL Lv   2 Current            1 Term 19 41w1d 20:06 / 01:18 8 lb 13.1 oz (4 kg) M Caesarean EPI N CHUNG      Birth Comments: Was on oxygen for TTN in [place]  Passed hearing test and cardiac screen  Received vitamin K, EES, hep B  No jaundice  Possible hypospadias      Complications: Fetal intolerance to labor, Preeclampsia       GYN hx:         Past Medical History:    Asthma (HCC)    Morbid obesity with BMI of 40.0-44.9, adult (HCC)    PCOS (polycystic ovarian syndrome)    Pregnancy-induced hypertension (HCC)    Thyroid nodule    Vitamin D deficiency     Past Surgical History:   Procedure Laterality Date          Dermatological procedure      Facial abscess removed    Each add tooth extraction      no associated bleeding complications    Removal gallbladder  2010    Tonsillectomy       No Known Allergies  Family History   Problem Relation Age of Onset    No Known Problems Father     Thyroid disease Mother     Other (epilepsy) Son 2    Other (Other) Son     Seizure Disorder Son     Thyroid disease Maternal Grandmother     No Known Problems Maternal Grandfather     Diabetes Paternal Grandmother     Cancer Paternal Grandmother     Diabetes Paternal Grandfather     No Known Problems Sister     No Known Problems Sister     No Known Problems Brother     Heart Disorder Neg     Hypertension Neg     Anemia  Neg     Bleeding Disorders Neg     Clotting Disorder Neg      Social History     Socioeconomic History    Marital status:    Tobacco Use    Smoking status: Never    Smokeless tobacco: Never   Vaping Use    Vaping status: Never Used   Substance and Sexual Activity    Alcohol use: No     Alcohol/week: 0.0 standard drinks of alcohol    Drug use: No    Sexual activity: Yes     Partners: Male     Birth control/protection: Condom     Social History     Social History Narrative    Mary is  x 3 yrs. Mother of 1 son. 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 Mary and her family live in [place], [place].       Medications reviewed. See active list.     /81   Pulse 67   Temp 98 °F (36.7 °C) (Oral)   Wt 225 lb (102.1 kg)   LMP 2023 (Approximate)   BMI 42.51 kg/m²       Exam:   GENERAL: well developed, well nourished, in no apparent distress  SKIN: no rashes, no suspicious lesions  ABDOMEN: gravid, nontender, EFW 3500 gms  GYNE/:  External Genitalia: Normal appearing, no lesions, normal hair distribution    5/100/-3 AROM with clear flluid  Component      Latest Ref Rng 2024   WBC      4.0 - 11.0 x10(3) uL 13.8 (H)    RBC      3.80 - 5.30 x10(6)uL 4.24    Hemoglobin      12.0 - 16.0 g/dL 13.5    Hematocrit      35.0 - 48.0 % 39.5    MCV      80.0 - 100.0 fL 93.2    MCH      26.0 - 34.0 pg 31.8    MCHC      31.0 - 37.0 g/dL 34.2    RDW-SD      35.1 - 46.3 fL 45.2    RDW      11.0 - 15.0 % 13.3    Platelet Count      150.0 - 450.0 10(3)uL 219.0    Immature Platelet Fraction      0.0 - 7.0 % 21.8 (H)    Prelim Neutrophil Abs      1.50 - 7.70 x10 (3) uL 11.16 (H)    Neutrophils Absolute      1.50 - 7.70 x10(3) uL 11.16 (H)    Lymphocytes Absolute      1.00 - 4.00 x10(3) uL 1.70    Monocytes Absolute      0.10 - 1.00 x10(3) uL 0.75    Eosinophils Absolute      0.00 - 0.70 x10(3) uL 0.10    Basophils Absolute      0.00 - 0.20 x10(3) uL 0.08    Immature Granulocyte Absolute      0.00 - 1.00 x10(3) uL  0.05    Neutrophils %      % 80.6    Lymphocytes %      % 12.3    Monocytes %      % 5.4    Eosinophils %      % 0.7    Basophils %      % 0.6    Immature Granulocyte %      % 0.4    Glucose      70 - 99 mg/dL 93    Sodium      136 - 145 mmol/L 136    Potassium      3.5 - 5.1 mmol/L 3.9    Chloride      98 - 112 mmol/L 105    Carbon Dioxide, Total      21.0 - 32.0 mmol/L 21.0    ANION GAP      0 - 18 mmol/L 10    BUN      9 - 23 mg/dL 10    CREATININE      0.55 - 1.02 mg/dL 0.63    BUN/CREATININE RATIO      10.0 - 20.0  15.9    CALCIUM      8.7 - 10.4 mg/dL 9.4    CALCULATED OSMOLALITY      275 - 295 mOsm/kg 281    EGFR      >=60 mL/min/1.73m2 123    ALT (SGPT)      10 - 49 U/L 22    AST (SGOT)      <34 U/L 23    ALKALINE PHOSPHATASE      37 - 98 U/L 140 (H)    Total Bilirubin      0.3 - 1.2 mg/dL 0.6    PROTEIN, TOTAL      5.7 - 8.2 g/dL 7.9    Albumin      3.2 - 4.8 g/dL 4.3    Globulin      2.0 - 3.5 g/dL 3.6 (H)    A/G Ratio      1.0 - 2.0  1.2    TREPONEMA PALLIDUM AB, PARTICLE AGGLUTINATION      Nonreactive   Nonreactive       Legend:  (H) High      A/P: Patient is 29 year old female  at 38 W with prev c/s with active labor - anticipate . Patient has been counseled re: risk of  including risk of uterine rupture, fetal distress, and rare risk of fetal death/disability.     Obesity in pregnancy    Hx of preeclampsia    Elevated bp - not severe preeclampsia. No need for Mg at this time.     Harleen Gabriel MD

## 2024-08-30 NOTE — PROGRESS NOTES
Pt is a 29 year old female admitted to TR2/TR2-A.     Chief Complaint   Patient presents with    R/o Labor     +CTX q2-3min x2hr; +FM; denies LOF; +minimal VB      Pt is  38w1d intra-uterine pregnancy.  History obtained, consents signed. Oriented to room, staff, and plan of care.

## 2024-08-31 PROBLEM — O14.03 MILD PRE-ECLAMPSIA IN THIRD TRIMESTER (HCC): Status: ACTIVE | Noted: 2024-08-31

## 2024-08-31 LAB
BASOPHILS # BLD AUTO: 0.07 X10(3) UL (ref 0–0.2)
BASOPHILS NFR BLD AUTO: 0.6 %
DEPRECATED RDW RBC AUTO: 45.7 FL (ref 35.1–46.3)
EOSINOPHIL # BLD AUTO: 0.18 X10(3) UL (ref 0–0.7)
EOSINOPHIL NFR BLD AUTO: 1.4 %
ERYTHROCYTE [DISTWIDTH] IN BLOOD BY AUTOMATED COUNT: 13.6 % (ref 11–15)
HCT VFR BLD AUTO: 33.7 %
HGB BLD-MCNC: 11.9 G/DL
IMM GRANULOCYTES # BLD AUTO: 0.05 X10(3) UL (ref 0–1)
IMM GRANULOCYTES NFR BLD: 0.4 %
LYMPHOCYTES # BLD AUTO: 2.13 X10(3) UL (ref 1–4)
LYMPHOCYTES NFR BLD AUTO: 16.8 %
MCH RBC QN AUTO: 32.6 PG (ref 26–34)
MCHC RBC AUTO-ENTMCNC: 35.3 G/DL (ref 31–37)
MCV RBC AUTO: 92.3 FL
MONOCYTES # BLD AUTO: 0.88 X10(3) UL (ref 0.1–1)
MONOCYTES NFR BLD AUTO: 7 %
NEUTROPHILS # BLD AUTO: 9.35 X10 (3) UL (ref 1.5–7.7)
NEUTROPHILS # BLD AUTO: 9.35 X10(3) UL (ref 1.5–7.7)
NEUTROPHILS NFR BLD AUTO: 73.8 %
PLATELET # BLD AUTO: 167 10(3)UL (ref 150–450)
RBC # BLD AUTO: 3.65 X10(6)UL
WBC # BLD AUTO: 12.7 X10(3) UL (ref 4–11)

## 2024-08-31 NOTE — LACTATION NOTE
This note was copied from a baby's chart.  LACTATION NOTE - INFANT    Evaluation Type  Evaluation Type: Inpatient    Problems & Assessment  Problems Diagnosed or Identified: Sleepy  Infant Assessment: Minimal hunger cues present;Oral mucous membranes moist  Muscle tone: Appropriate for GA    Feeding Assessment  Summary Current Feeding: Breastfeeding exclusively  Breastfeeding Assessment: Assisted with breastfeeding w/mother's permission;Sleepy infant, recently fed  Other (comment): Dyad seen at 20 hours. Mom endorses this is the first time she has  and is experiencing some soreness on her nipples, likely due to not having optimal positioning and attachment. Mom states that they tried to offer her a bottle of formula due to thinking she was not eating enough but infant did not take anything. Educated on the risk to breast milk supply with the introduction of supplementation without pumping. Additionally discussed signs that infant is adequately eating at the breast. Attempted to latch infant but infant recently ate. Placed infant STS and instructed mom to call when infant shows hunger cues. Breastfeeding education discussed in depth.    Output  # Voids in 24 hours: 3  # Stools in 24 hours: 3

## 2024-08-31 NOTE — LACTATION NOTE
LACTATION NOTE - MOTHER      Evaluation Type: Inpatient    Problems identified  Problems identified: Milk supply WNL;Knowledge deficit;Nipple pain    Maternal history  Maternal history: PIH;Obesity  Other/comment: , pre-diabetes    Breastfeeding goal  Breastfeeding goal: To maintain breast milk feeding per patient goal    Maternal Assessment  Bilateral Breasts: Soft;Wide spaced  Bilateral Nipples: Colostrum easily expressed;Everted;Sore  Prior breastfeeding experience (comment below): First baby to breast  Breastfeeding Assistance: Breastfeeding assistance provided with permission;Hand expression provided with permission    Pain assessment  Pain, additional: Pain w/initial sucks only  Location/Comment: soreness  Treatment of Sore Nipples: Deeper latch techniques;Expressed breast milk    Guidelines for use of:  Current use of pump:: not indicated  Suggested use of pump: Avoid overstimulation of milk supply;Pump each time a supplement is offered  Other (comment): Dyad seen at 20 hours. Mom endorses this is the first time she has  and is experiencing some soreness on her nipples, likely due to not having optimal positioning and attachment. Mom states that they tried to offer her a bottle of formula due to thinking she was not eating enough but infant did not take anything. Educated on the risk to breast milk supply with the introduction of supplementation without pumping. Additionally discussed signs that infant is adequately eating at the breast. Attempted to latch infant but infant recently ate. Placed infant STS and instructed mom to call when infant shows hunger cues. Breastfeeding education discussed in depth.

## 2024-08-31 NOTE — PROGRESS NOTES
Pain well controlled. No heavy bleeding    Patient Vitals for the past 24 hrs:   BP Temp Temp src Pulse Resp SpO2   08/31/24 0602 129/74 -- -- 75 16 --   08/31/24 0128 129/85 97.7 °F (36.5 °C) Oral 61 14 --   08/30/24 2100 -- 98.7 °F (37.1 °C) Oral -- 16 --   08/30/24 1726 114/69 98.2 °F (36.8 °C) Oral 80 16 --   08/30/24 1530 116/74 99 °F (37.2 °C) Oral 79 16 --   08/30/24 1500 123/67 -- -- 75 -- --   08/30/24 1400 123/66 -- -- 72 -- --   08/30/24 1345 118/64 -- -- 75 -- --   08/30/24 1300 128/77 -- -- 68 -- --   08/30/24 1245 125/78 -- -- 77 16 --   08/30/24 1230 136/84 -- -- 80 18 --   08/30/24 1220 129/72 98.5 °F (36.9 °C) Axillary 79 16 100 %   08/30/24 1200 135/71 -- -- 83 -- 99 %   08/30/24 1130 127/70 -- -- 104 -- 99 %   08/30/24 1129 127/70 -- -- 104 -- 99 %   08/30/24 1115 (!) 136/96 -- -- 81 -- 99 %   08/30/24 1100 143/84 -- -- 90 -- 100 %   08/30/24 1030 126/79 -- -- 89 -- 98 %   08/30/24 1015 122/65 -- -- 89 -- 99 %   08/30/24 1000 107/65 -- -- 101 -- 99 %   08/30/24 0945 104/63 -- -- 87 -- 97 %   08/30/24 0930 105/66 -- -- 76 -- 97 %   08/30/24 0915 109/65 -- -- 82 -- 97 %   08/30/24 0900 134/67 98.8 °F (37.1 °C) Oral 115 -- 100 %   08/30/24 0845 132/90 -- -- 83 -- 99 %   08/30/24 0830 132/79 -- -- 84 -- 99 %   08/30/24 0815 132/80 -- -- 80 -- 99 %   08/30/24 0800 134/80 -- -- 81 -- 99 %   08/30/24 0745 131/76 -- -- 99 -- 98 %   08/30/24 0730 126/76 -- -- 78 -- 97 %   08/30/24 0715 126/77 98.5 °F (36.9 °C) Axillary 75 18 98 %     ABD: FF  EXT: no calf tenderness    Vaginal pack removed.     WBC 12.7 High  x10(3) uL     RBC 3.65 Low  x10(6)uL    HGB 11.9 Low  g/dL    HCT 33.7 Low  %    MCV 92.3 fL    MCH 32.6 pg    MCHC 35.3 g/dL    RDW-SD 45.7 fL    RDW 13.6 %    .0 10(3)uL    Neutrophil Absolute Prelim 9.35 High  x10 (3) uL    Neutrophil Absolute 9.35 High  x10(3) uL    Lymphocyte Absolute 2.13 x10(3) uL    Monocyte Absolute 0.88 x10(3) uL    Eosinophil Absolute 0.18 x10(3) uL    Basophil  Absolute 0.07 x10(3) uL    Immature Granulocyte Absolute 0.05 x10(3) uL    Neutrophil % 73.8 %    Lymphocyte % 16.8 %    Monocyte % 7.0 %    Eosinophil % 1.4 %    Basophil % 0.6 %    Immature Granulocyte % 0.4      PPD#1 stable.     Mild preeclampsia.

## 2024-09-01 VITALS
RESPIRATION RATE: 18 BRPM | BODY MASS INDEX: 43 KG/M2 | SYSTOLIC BLOOD PRESSURE: 128 MMHG | WEIGHT: 225 LBS | OXYGEN SATURATION: 100 % | HEART RATE: 57 BPM | TEMPERATURE: 98 F | DIASTOLIC BLOOD PRESSURE: 82 MMHG

## 2024-09-01 PROBLEM — O34.219 VBAC, DELIVERED (HCC): Status: ACTIVE | Noted: 2024-09-01

## 2024-09-01 PROBLEM — Z34.90 PREGNANT (HCC): Status: RESOLVED | Noted: 2024-08-30 | Resolved: 2024-09-01

## 2024-09-01 RX ORDER — IBUPROFEN 600 MG/1
600 TABLET, FILM COATED ORAL EVERY 6 HOURS PRN
Qty: 30 TABLET | Refills: 0 | Status: SHIPPED | OUTPATIENT
Start: 2024-09-01

## 2024-09-01 NOTE — DISCHARGE SUMMARY
[facility]  part of [facility]    Discharge Summary    Mary Chatman Patient Status:  Inpatient    8/15/1995 MRN [id number]   Location [facility] 3SE Attending Harleen Gabriel*   Hosp Day # 2 PCP John Polanco MD     Date of Admission: 2024    Admission Diagnoses: pregnancy  Pregnant ([facility])    Date of Discharge: 2024    Discharge Diagnoses:S/P Vaginal Delivery    Episode Diagnoses:   OBN 2024 Problems (from 24 to present)       Problem Noted Resolved    History of  2024 by Paola Fields MD No    Overview Addendum 3/26/2024  1:27 PM by Harleen Gabriel MD     Patient would like JARON         BMI 40.0-44.9, adult ([facility]) 2024 by Paola Fields MD No    Overview Signed 2024  5:43 PM by Paola Fields MD       If BMI>40, [  ] level 2 u/s & serial growth                         Hospital Course:     EDC: Estimated Date of Delivery: 24    Gestational Age: 38w1d    Date of Delivery: 2024   Time of Delivery: 11:25 AM     Antepartum complications: none    Delivered By: AURORA PARISH     Delivery Method: Vaginal birth after caesarian     Delivery Procedures:     Baby: female       Apgars:  1 minute:   9                  5 minutes: 9                           10 minutes:      Feeding Method:The patient is currently breastfeeding.     Intrapartum Complications: mild preeclampsia    Lacerations      Perineal lacerations: 2nd Repaired?: Yes     Vaginal laceration?: Yes Repaired?: Yes     Cervical laceration?: No    Clitoral laceration?: No             Episiotomy: None     Placenta: Spontaneous     Postpartum complications: None                  Discharge Plan:   Discharge Condition: Good    Discharge medications:  Current Discharge Medication List        New Orders    Details   ibuprofen 600 MG Oral Tab Take 1 tablet (600 mg total) by mouth every 6 (six) hours as needed for Pain (for pain).           Home Meds - Unchanged    Details    prenatal vitamin with DHA 27-0.8-228 MG Oral Cap Take 1 capsule by mouth daily.                   Discharge Diet: As tolerated and General diet    Discharge Activity: Pelvic rest until cleared and As tolerated    Follow up:     Follow Up in the Office: 3-5 days for blood pressure check     Follow-up Information       [facility]. Call.    Specialty: Pediatrics  Contact information:  [address]  [facility] [address]  [phone]  Additional information:  Masks are optional for all patients and visitors, unless otherwise indicated.             Georges Blue MD Follow up in 3 day(s).    Specialty: OBSTETRICS & GYNECOLOGY  Why: For blood pressure check  Contact information:  [address] [address]  [phone]                                   Other Discharge Instructions:         Call if fever greater than 101, worsening pain, nausea or vomiting, heavy vaginal bleeding.    No sex tampons or douching for 6 weeks    It is ok to shower, but do not soak your wound.    Please call if you have persistent headaches, worsening swelling, pain under your right ribs, or blurry vision.    Please let us know if you are having any signs or symptoms of post partum depression - persistent sadness, feelings of hopelessness or worthlessness, sleeping or eating too much or too little, feeling like you would like to hurt yourself.   Post Vaginal Delivery Home Care Instructions     We hope you were pleased with your care at [facility].  We wish you the best outcome and overall experience with the delivery of your baby.  These instructions will help to minimize pain, limit the risk for an infection, and improve the likelihood of a successful recovery.    What to Expect:  Abdominal cramping after delivery especially if you are breastfeeding.   Vaginal bleeding for about 4-6 weeks that may be followed by a yellow or white discharge for a few more weeks.  Your period  will resume in approximately 6-8 weeks, unless you are breastfeeding.    If you are bottle feeding, you may notice breast engorgement in about 3 days.  Your breast may be sore and hard. Please wear a tight fitted bra or sports bra for 24-36 hours to help prevent your breast from producing milk, and use ice packs to relive any discomfort.  If you are breastfeeding, nipple dryness is very common the first few days.    Constipation is common after having a baby.  Please increase fluid and fiber in your diet.      Over-The-Counter Medication  Non-prescription anti-inflammatory medications can also help to ease the pain.  You may take Aleve, Tylenol or Ibuprofen   Colace or Metamucil for Constipation  Lanolin for dry nipples  Tucks, Witch Hazel and Epifoam for vaginal/perineum discomfort.   Drink a full glass of water with oral medication and take as directed.    Wound Care  The following instructions will promote proper healing and help to prevent infection  Vaginal/perineum Care: Sitz Bath for 15mins, 2-3 times a day,    Bathing/Showers  You may resume showers  No baths, swimming, hot tubs until your post-partum visit    Home Medication  Resume your home medications as instructed    Diet  Resume your normal diet    Activity  Refrain from vaginal intercourse, vaginal suppositories, tampon use or douches until after your post-partum visit.  No exercising for 4 weeks  You may climb stairs minimally for the 1st week.    Do not do heavy housework for at least 2-3 weeks    Return to Work or School  You may return to work in approximately 6 weeks  Contact your obstetrician’s office, if you need a medical release. ([phone])    Driving  Avoid driving if you are taking narcotics for pain relief.    Follow-up Appointment with Your Obstetrician  Call your obstetrician’s office today for an appointment in 4-6 weeks.    The number is [phone].  Verify your appointment date, day, time, and location.  At your 1st post-partum  office visit:  Your progress will be evaluated, findings reviewed, and any additional concerns and instructions will be discussed.    Questions or Concerns  Call your obstetrician’s office if you experience the following:  Severe pain not controlled by pain medication  Foul smelling vaginal discharge  Heavy bleeding  Shortness of breath  Fever  Redness, increased swelling or drainage from your incision  Crying and periods of sadness that prevents you from caring for yourself and your baby  Burning sensation during urination or inability to urinate  Swelling, redness or abnormal warmth to your leg/calf  Please call ([phone]). If your call is made after office hours, a physician will be available to help you.  There is always a provider covering our patients.    Thank you for coming to [facility] to start your new family.  The nurses and the anesthesiologists try very hard to make sure you receive the best care possible.  Your trust in them as well as us is greatly appreciated.    Thanks so much,   [facility]stetric[facility] Gyne[facility]          Harleen Gabriel MD  9/1/2024

## 2024-09-01 NOTE — LACTATION NOTE
09/01/24 1145   Evaluation Type   Evaluation Type Inpatient   Problems identified   Problems identified Knowledge deficit   Maternal history   Maternal history PIH;Obesity   Breastfeeding goal   Breastfeeding goal To maintain breast milk feeding per patient goal   Maternal Assessment   Bilateral Breasts Soft;Wide spaced   Bilateral Nipples Elastic;Sore;Pink   Prior breastfeeding experience (comment below) First baby to breast   Breastfeeding Assistance Breastfeeding assistance provided with permission   Pain assessment   Pain, additional Pain w/initial sucks only   Treatment of Sore Nipples Deeper latch techniques;Hydrogel dressings as directed   Guidelines for use of:   Other (comment) States nipple pain with every latch, which led to her asking for formula to use for supplementation overnight. With coaching, mom was able to latch deeply without pain. Provided mom with hydrogels to promote nipple healing. Encouraged to make a lactation appointment if nipple pain persists.

## 2024-09-01 NOTE — DISCHARGE INSTRUCTIONS
Call if fever greater than 101, worsening pain, nausea or vomiting, heavy vaginal bleeding.    No sex tampons or douching for 6 weeks    It is ok to shower, but do not soak your wound.    Please call if you have persistent headaches, worsening swelling, pain under your right ribs, or blurry vision.    Please let us know if you are having any signs or symptoms of post partum depression - persistent sadness, feelings of hopelessness or worthlessness, sleeping or eating too much or too little, feeling like you would like to hurt yourself.   Post Vaginal Delivery Home Care Instructions     We hope you were pleased with your care at [facility].  We wish you the best outcome and overall experience with the delivery of your baby.  These instructions will help to minimize pain, limit the risk for an infection, and improve the likelihood of a successful recovery.    What to Expect:  Abdominal cramping after delivery especially if you are breastfeeding.   Vaginal bleeding for about 4-6 weeks that may be followed by a yellow or white discharge for a few more weeks.  Your period will resume in approximately 6-8 weeks, unless you are breastfeeding.    If you are bottle feeding, you may notice breast engorgement in about 3 days.  Your breast may be sore and hard. Please wear a tight fitted bra or sports bra for 24-36 hours to help prevent your breast from producing milk, and use ice packs to relive any discomfort.  If you are breastfeeding, nipple dryness is very common the first few days.    Constipation is common after having a baby.  Please increase fluid and fiber in your diet.      Over-The-Counter Medication  Non-prescription anti-inflammatory medications can also help to ease the pain.  You may take Aleve, Tylenol or Ibuprofen   Colace or Metamucil for Constipation  Lanolin for dry nipples  Tucks, Witch Hazel and Epifoam for vaginal/perineum discomfort.   Drink a full glass of water with oral medication and  take as directed.    Wound Care  The following instructions will promote proper healing and help to prevent infection  Vaginal/perineum Care: Sitz Bath for 15mins, 2-3 times a day,    Bathing/Showers  You may resume showers  No baths, swimming, hot tubs until your post-partum visit    Home Medication  Resume your home medications as instructed    Diet  Resume your normal diet    Activity  Refrain from vaginal intercourse, vaginal suppositories, tampon use or douches until after your post-partum visit.  No exercising for 4 weeks  You may climb stairs minimally for the 1st week.    Do not do heavy housework for at least 2-3 weeks    Return to Work or School  You may return to work in approximately 6 weeks  Contact your obstetrician’s office, if you need a medical release. ([phone])    Driving  Avoid driving if you are taking narcotics for pain relief.    Follow-up Appointment with Your Obstetrician  Call your obstetrician’s office today for an appointment in 4-6 weeks.    The number is [phone].  Verify your appointment date, day, time, and location.  At your 1st post-partum office visit:  Your progress will be evaluated, findings reviewed, and any additional concerns and instructions will be discussed.    Questions or Concerns  Call your obstetrician’s office if you experience the following:  Severe pain not controlled by pain medication  Foul smelling vaginal discharge  Heavy bleeding  Shortness of breath  Fever  Redness, increased swelling or drainage from your incision  Crying and periods of sadness that prevents you from caring for yourself and your baby  Burning sensation during urination or inability to urinate  Swelling, redness or abnormal warmth to your leg/calf  Please call ([phone]). If your call is made after office hours, a physician will be available to help you.  There is always a provider covering our patients.    Thank you for coming to [facility] to start your new  family.  The nurses and the anesthesiologists try very hard to make sure you receive the best care possible.  Your trust in them as well as us is greatly appreciated.    Thanks so much,   [facility] Obstetrics and Gynecology

## 2024-09-01 NOTE — LACTATION NOTE
This note was copied from a baby's chart.     09/01/24 1144   Evaluation Type   Evaluation Type Inpatient   Problems & Assessment   Infant Assessment Minimal hunger cues present;Skin color: pink or appropriate for ethnicity   Muscle tone Appropriate for GA   Feeding Assessment   Summary Current Feeding Breastfeeding with formula supplement   Breastfeeding Assessment Assisted with breastfeeding w/mother's permission;Calm and ready to breastfeed;Sleepy infant, quickly pacifies   Breastfeeding Positions cross cradle;right breast   Latch 1   Audible Sucks/Swallows 0   Type of Nipple 2   Comfort (Breast/Nipple) 1   Hold (Positioning) 1   LATCH Score 5

## 2024-09-01 NOTE — PROGRESS NOTES
No HA, visual changes or RUQ pain. No heavy vaginal bleeding    Patient Vitals for the past 24 hrs:   BP Temp Temp src Pulse Resp   08/31/24 2000 131/69 98.2 °F (36.8 °C) Oral 69 16   08/31/24 1042 114/76 97.9 °F (36.6 °C) Oral 72 16   08/31/24 0602 129/74 -- -- 75 16     ABD: FF  EXT no calf tenderness    PPD#2 for home. DW her discharge instructions.

## 2024-09-01 NOTE — PLAN OF CARE
Problem: POSTPARTUM  Goal: Long Term Goal:Experiences normal postpartum course  Description: INTERVENTIONS:  - Assess and monitor vital signs and lab values.  - Assess fundus and lochia.  - Provide ice/sitz baths for perineum discomfort.  - Monitor healing of incision/episiotomy/laceration, and assess for signs and symptoms of infection and hematoma.  - Assess bladder function and monitor for bladder distention.  - Provide/instruct/assist with pericare as needed.  - Provide VTE prophylaxis as needed.  - Monitor bowel function.  - Encourage ambulation and provide assistance as needed.  - Assess and monitor emotional status and provide social service/psych resources as needed.  - Utilize standard precautions and use personal protective equipment as indicated. Ensure aseptic care of all intravenous lines and invasive tubes/drains.  - Obtain immunization and exposure to communicable diseases history.  Outcome: Progressing  Goal: Optimize infant feeding at the breast  Description: INTERVENTIONS:  - Initiate breast feeding within first hour after birth.   - Monitor effectiveness of current breast feeding efforts.  - Assess support systems available to mother/family.  - Identify cultural beliefs/practices regarding lactation, letdown techniques, maternal food preferences.  - Assess mother's knowledge and previous experience with breast feeding.  - Provide information as needed about early infant feeding cues (e.g., rooting, lip smacking, sucking fingers/hand) versus late cue of crying.  - Discuss/demonstrate breast feeding aids (e.g., infant sling, nursing footstool/pillows, and breast pumps).  - Encourage mother/other family members to express feelings/concerns, and actively listen.  - Educate father/SO about benefits of breast feeding and how to manage common lactation challenges.  - Recommend avoidance of specific medications or substances incompatible with breast feeding.  - Assess and monitor for signs of nipple  pain/trauma.  - Instruct and provide assistance with proper latch.  - Review techniques for milk expression (breast pumping) and storage of breast milk. Provide pumping equipment/supplies, instructions and assistance, as needed.  - Encourage rooming-in and breast feeding on demand.  - Encourage skin-to-skin contact.  - Provide LC support as needed.  - Assess for and manage engorgement.  - Provide breast feeding education handouts and information on community breast feeding support.   Outcome: Progressing  Goal: Establishment of adequate milk supply with medication/procedure interruptions  Description: INTERVENTIONS:  - Review techniques for milk expression (breast pumping).   - Provide pumping equipment/supplies, instructions, and assistance until it is safe to breastfeed infant.  Outcome: Progressing  Goal: Experiences normal breast weaning course  Description: INTERVENTIONS:  - Assess for and manage engorgement.  - Instruct on breast care.  - Provide comfort measures.  Outcome: Progressing  Goal: Appropriate maternal -  bonding  Description: INTERVENTIONS:  - Assess caregiver- interactions.  - Assess caregiver's emotional status and coping mechanisms.  - Encourage caregiver to participate in  daily care.  - Assess support systems available to mother/family.  - Provide /case management support as needed.  Outcome: Progressing     Problem: PAIN - ADULT  Goal: Verbalizes/displays adequate comfort level or patient's stated pain goal  Description: INTERVENTIONS:  - Encourage pt to monitor pain and request assistance  - Assess pain using appropriate pain scale  - Administer analgesics based on type and severity of pain and evaluate response  - Implement non-pharmacological measures as appropriate and evaluate response  - Consider cultural and social influences on pain and pain management  - Manage/alleviate anxiety  - Utilize distraction and/or relaxation techniques  - Monitor for  opioid side effects  - Notify MD/LIP if interventions unsuccessful or patient reports new pain  - Anticipate increased pain with activity and pre-medicate as appropriate  Outcome: Progressing     Problem: GENITOURINARY - ADULT  Goal: Absence of urinary retention  Description: INTERVENTIONS:  - Assess patient’s ability to void and empty bladder  - Monitor intake/output and perform bladder scan as needed  - Follow urinary retention protocol/standard of care  - Consider collaborating with pharmacy to review patient's medication profile  - Implement strategies to promote bladder emptying  Outcome: Progressing

## 2024-09-04 ENCOUNTER — NURSE ONLY (OUTPATIENT)
Dept: OBGYN CLINIC | Facility: CLINIC | Age: 29
End: 2024-09-04
Payer: COMMERCIAL

## 2024-09-04 VITALS — DIASTOLIC BLOOD PRESSURE: 72 MMHG | SYSTOLIC BLOOD PRESSURE: 128 MMHG

## 2024-09-04 NOTE — PROGRESS NOTES
Patient here for nurse visit for blood pressure check. Two patient identifiers verified with patient.      Date of delivery: 9/12/24   via vaginal delivery   Current medications: Prenatal & ibuprofen    Symptoms:   Headache: no   Blurry vision/spots before eyes/vision changes: no   Right upper quadrant pain: no   Any other new symptoms: no      Blood pressure: 128/72    REPEAT Blood pressure (if applicable): n/a    Discussed with Dr. Potts    Patient verbalized understanding and agrees with plan of care

## 2024-10-01 ENCOUNTER — TELEPHONE (OUTPATIENT)
Dept: OBGYN UNIT | Facility: HOSPITAL | Age: 29
End: 2024-10-01

## 2024-10-11 ENCOUNTER — POSTPARTUM (OUTPATIENT)
Dept: OBGYN CLINIC | Facility: CLINIC | Age: 29
End: 2024-10-11
Payer: COMMERCIAL

## 2024-10-11 VITALS
DIASTOLIC BLOOD PRESSURE: 70 MMHG | WEIGHT: 203 LBS | BODY MASS INDEX: 38.33 KG/M2 | HEIGHT: 61 IN | SYSTOLIC BLOOD PRESSURE: 116 MMHG

## 2024-10-11 PROCEDURE — 3078F DIAST BP <80 MM HG: CPT | Performed by: OBSTETRICS & GYNECOLOGY

## 2024-10-11 PROCEDURE — 3074F SYST BP LT 130 MM HG: CPT | Performed by: OBSTETRICS & GYNECOLOGY

## 2024-10-11 PROCEDURE — 3008F BODY MASS INDEX DOCD: CPT | Performed by: OBSTETRICS & GYNECOLOGY

## 2024-10-11 RX ORDER — FAMOTIDINE 20 MG
1 TABLET ORAL DAILY
Qty: 60 CAPSULE | Refills: 3 | Status: SHIPPED | OUTPATIENT
Start: 2024-10-11 | End: 2024-11-10

## 2024-10-11 NOTE — PROGRESS NOTES
[facility]  Obstetrics and Gynecology   Postpartum Progress Note    Subjective:     Mary Chatman is a 29 year old  female who is s/p  on 24. Her pregnancy was complicated by pre-eclampsia and previous . She reports doing well. Baby daughter doing well and breast/bottle feeding with breastmilk. The patient reports vagina bleeding is light. The patient denies emotional concerns.     Has not had sex yet.    Review of Systems:  General: denies fevers, chills, fatigue and malaise.   Respiratory: denies SOB, dyspnea, cough or wheezing  Cardiovascular: denies chest pain, palpitations, exercise intolerance   GI:denies abdominal pain, diarrhea, constipation  : denies dysuria, hematuria, increased urinary frequency. denies abnormal uterine bleeding or vaginal discharge.       Objective:     Vitals:    10/11/24 1146   BP: 116/70   Weight: 203 lb (92.1 kg)   Height: 61\"       Body mass index is 38.36 kg/m².    GENERAL: well developed, well nourished, in no apparent distress, alert and orientated X 3  PSYCH: mood and affect stable   SKIN: no rashes, no lesions  HEENT: normal  LUNGS: respiration unlabored  CARDIOVASCULAR: no peripheral edema or varicosities, skin warm and dry  ABDOMEN: Soft, non distended; non tender, no masses  GYNE/:   External Genitalia: normal, no lesions, good perineal support  Urethra: meatus normal   Bladder: well supported  Vagina: normal mucosa, no lesions, discharge absent, strength 3/5  Uterus: normal size, mobile, nontender  Cervix: normal os, no lesions or bleeding  Adnexa:normal size, bilaterally nontender, no palpable masses  Cul-de-sac: normal  R/V: normal perineum, no hemorrhoids  EXTREMITIES:  nontender without edema        Assessment:     Mary Chatman is a 29 year old  female who presents for postpartum visit   Patient Active Problem List   Diagnosis    Vitamin D deficiency    Prediabetes    History of     BMI 40.0-44.9,  adult ([facility])    Hx of preeclampsia, prior pregnancy, currently pregnant ([facility])    Obesity in pregnancy ([facility])    Abnormal fetal echocardiogram affecting antepartum care of mother ([facility])- normal fu echo on ; recommend  echocardiogram prior to discharge    Mild pre-eclampsia in third trimester ([facility])    , delivered ([facility])         Plan:     Postpartum exam   - doing well  - no abnormal findings on physical exam   - may return to normal activity   - pap up to dayte    Contraception counseling   - discussion held with patient about family planning and contraception  - pt desires condoms. Info given about VCF.    All of the findings and plan were discussed with the patient.  She notes understanding and agrees with the plan of care.  All questions were answered to the best of my ability at this time.    RTC in  1 year for well woman exam or sooner if needed     Manuela Sheehan, DO

## 2024-10-14 ENCOUNTER — LAB ENCOUNTER (OUTPATIENT)
Dept: LAB | Age: 29
End: 2024-10-14
Attending: INTERNAL MEDICINE
Payer: COMMERCIAL

## 2024-10-14 DIAGNOSIS — E06.3 HASHIMOTO'S DISEASE: ICD-10-CM

## 2024-10-14 LAB — TSI SER-ACNC: 2.22 MIU/ML (ref 0.55–4.78)

## 2024-10-14 PROCEDURE — 36415 COLL VENOUS BLD VENIPUNCTURE: CPT

## 2024-10-14 PROCEDURE — 84443 ASSAY THYROID STIM HORMONE: CPT

## 2024-11-19 ENCOUNTER — OFFICE VISIT (OUTPATIENT)
Dept: FAMILY MEDICINE CLINIC | Facility: CLINIC | Age: 29
End: 2024-11-19

## 2024-11-19 VITALS
WEIGHT: 202 LBS | DIASTOLIC BLOOD PRESSURE: 72 MMHG | BODY MASS INDEX: 38.14 KG/M2 | SYSTOLIC BLOOD PRESSURE: 107 MMHG | TEMPERATURE: 98 F | HEIGHT: 61 IN | HEART RATE: 103 BPM

## 2024-11-19 DIAGNOSIS — R73.03 PREDIABETES: ICD-10-CM

## 2024-11-19 DIAGNOSIS — Z00.00 WELL ADULT EXAM: Primary | ICD-10-CM

## 2024-11-19 DIAGNOSIS — O14.03 MILD PRE-ECLAMPSIA IN THIRD TRIMESTER (HCC): ICD-10-CM

## 2024-11-19 DIAGNOSIS — E06.3 HASHIMOTO'S DISEASE: ICD-10-CM

## 2024-11-19 DIAGNOSIS — R09.81 SINUS CONGESTION: ICD-10-CM

## 2024-11-19 DIAGNOSIS — R09.81 NASAL CONGESTION: ICD-10-CM

## 2024-11-19 PROCEDURE — 3078F DIAST BP <80 MM HG: CPT | Performed by: FAMILY MEDICINE

## 2024-11-19 PROCEDURE — 3074F SYST BP LT 130 MM HG: CPT | Performed by: FAMILY MEDICINE

## 2024-11-19 PROCEDURE — 99395 PREV VISIT EST AGE 18-39: CPT | Performed by: FAMILY MEDICINE

## 2024-11-19 PROCEDURE — 3008F BODY MASS INDEX DOCD: CPT | Performed by: FAMILY MEDICINE

## 2024-11-19 PROCEDURE — 99213 OFFICE O/P EST LOW 20 MIN: CPT | Performed by: FAMILY MEDICINE

## 2024-11-19 RX ORDER — AMOXICILLIN 875 MG/1
875 TABLET, COATED ORAL 2 TIMES DAILY
Qty: 20 TABLET | Refills: 0 | Status: SHIPPED | OUTPATIENT
Start: 2024-11-19 | End: 2024-11-29

## 2024-11-19 RX ORDER — AZELASTINE 1 MG/ML
1 SPRAY, METERED NASAL 2 TIMES DAILY
Qty: 1 EACH | Refills: 0 | Status: SHIPPED | OUTPATIENT
Start: 2024-11-19

## 2024-11-19 RX ORDER — CHOLECALCIFEROL (VITAMIN D3) 25 MCG
1000 TABLET ORAL DAILY
COMMUNITY
Start: 2024-11-07

## 2024-11-19 NOTE — PROGRESS NOTES
HPI:    Patient ID: Mary Chatman is a 29 year old female.    Nasal Congestion  Pertinent negatives include no abdominal pain, arthralgias, chest pain, chills, congestion, coughing, fatigue, fever, headaches, myalgias, nausea, numbness, rash, sore throat, vomiting or weakness.     Chief Complaint   Patient presents with    Routine Physical    Nasal Congestion     And cough states everyone in her house has been sick on and off X 2 weeks        Wt Readings from Last 6 Encounters:   24 202 lb (91.6 kg)   10/11/24 203 lb (92.1 kg)   24 225 lb (102.1 kg)   24 225 lb (102.1 kg)   24 222 lb 11.2 oz (101 kg)   24 221 lb (100.2 kg)     BP Readings from Last 3 Encounters:   24 107/72   10/11/24 116/70   24 128/72     2 kids    Had second baby 2 months ago.  No fever  Everyone has been sick at her home  No covid exposure.    Had   Mild preeclampsia , no meds, during pregnancy   She is nursing       Review of Systems   Constitutional:  Negative for activity change, appetite change, chills, fatigue, fever and unexpected weight change.   HENT:  Negative for congestion, dental problem, drooling, ear discharge, ear pain, facial swelling, hearing loss, mouth sores, nosebleeds, postnasal drip, rhinorrhea, sinus pressure, sinus pain, sneezing, sore throat, tinnitus, trouble swallowing and voice change.    Eyes:  Negative for pain, discharge, redness and visual disturbance.   Respiratory:  Negative for cough, shortness of breath and wheezing.    Cardiovascular:  Negative for chest pain, palpitations and leg swelling.   Gastrointestinal:  Negative for abdominal pain, anal bleeding, blood in stool, constipation, diarrhea, nausea, rectal pain and vomiting.   Endocrine: Negative for cold intolerance, heat intolerance, polydipsia, polyphagia and polyuria.   Genitourinary:  Negative for decreased urine volume, difficulty urinating, dysuria, flank pain, frequency, menstrual problem,  pelvic pain, urgency, vaginal bleeding, vaginal discharge and vaginal pain.        No menses, breastfeeding   Musculoskeletal:  Negative for arthralgias, back pain and myalgias.   Skin:  Negative for rash.   Neurological:  Negative for dizziness, seizures, syncope, weakness, numbness and headaches.   Hematological:  Does not bruise/bleed easily.   Psychiatric/Behavioral:  Negative for behavioral problems, decreased concentration, self-injury, sleep disturbance and suicidal ideas. The patient is not nervous/anxious.        /72   Pulse 103   Temp 98.1 °F (36.7 °C) (Temporal)   Ht 5' 1\" (1.549 m)   Wt 202 lb (91.6 kg)   LMP 2023 (Approximate)   Breastfeeding Yes   BMI 38.17 kg/m²     Past Medical History:    Asthma (HCC)    Morbid obesity with BMI of 40.0-44.9, adult (HCC)    PCOS (polycystic ovarian syndrome)    Pregnancy-induced hypertension (HCC)    Thyroid nodule    Vitamin D deficiency     Past Surgical History:   Procedure Laterality Date          Dermatological procedure      Facial abscess removed    Each add tooth extraction      no associated bleeding complications    Removal gallbladder  2010    Tonsillectomy       Social History     Socioeconomic History    Marital status:      Spouse name: Not on file    Number of children: Not on file    Years of education: Not on file    Highest education level: Not on file   Occupational History    Not on file   Tobacco Use    Smoking status: Never    Smokeless tobacco: Never   Vaping Use    Vaping status: Never Used   Substance and Sexual Activity    Alcohol use: No     Alcohol/week: 0.0 standard drinks of alcohol    Drug use: No    Sexual activity: Yes     Partners: Male     Birth control/protection: Condom   Other Topics Concern     Service Not Asked    Blood Transfusions Not Asked    Caffeine Concern Yes     Comment: 1 cup soda/coffee per day    Occupational Exposure Not Asked    Hobby Hazards Not Asked    Sleep Concern  Not Asked    Stress Concern Not Asked    Weight Concern Not Asked    Special Diet Not Asked    Back Care Not Asked    Exercise Not Asked    Bike Helmet Not Asked    Seat Belt Not Asked    Self-Exams Not Asked   Social History Narrative    Mary is  x 3 yrs. Mother of 1 son. 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 Mary and her family live in [place], [place].     Social Drivers of Health     Financial Resource Strain: Low Risk  (8/30/2024)    Financial Resource Strain     Difficulty of Paying Living Expenses: Not very hard     Med Affordability: No   Food Insecurity: No Food Insecurity (8/30/2024)    Food Insecurity     Food Insecurity: Never true   Transportation Needs: No Transportation Needs (8/30/2024)    Transportation Needs     Lack of Transportation: No     Car Seat: Yes   Stress: No Stress Concern Present (8/30/2024)    Stress     Feeling of Stress : No   Housing Stability: Low Risk  (8/30/2024)    Housing Stability     Housing Instability: No     Housing Instability Emergency: Not on file     Crib or Bassinette: Yes     Family History   Problem Relation Age of Onset    No Known Problems Father     Thyroid disease Mother     Other (epilepsy) Son 2    Other (Other) Son     Seizure Disorder Son     Thyroid disease Maternal Grandmother     No Known Problems Maternal Grandfather     Diabetes Paternal Grandmother     Cancer Paternal Grandmother     Diabetes Paternal Grandfather     No Known Problems Sister     No Known Problems Sister     No Known Problems Brother     Heart Disorder Neg     Hypertension Neg     Anemia Neg     Bleeding Disorders Neg     Clotting Disorder Neg        Immunization History   Administered Date(s) Administered    Covid-19 Vaccine Pfizer 30 mcg/0.3 ml 03/19/2021, 12/29/2021    DTAP 10/17/1995, 01/09/1996, 03/02/1996, 08/21/1996, 04/26/2000    DTP 10/17/1995, 01/09/1996, 03/02/1996, 08/21/1996    FLU VAC QIV SPLIT 3 YRS AND OLDER (73432) 10/22/2019    FLULAVAL 6 months & older 0.5 ml  Prefilled syringe (64283) 10/22/2019, 12/02/2020    FLUZONE 6 months and older PFS 0.5 ml (89550) 10/08/2018    Fluvirin, 3 Years & >, Im 11/13/2004    HEP A 07/07/2012, 01/07/2016    HEP B 08/15/1995, 09/19/1995, 03/02/1996    HEP B, Ped/Adol 08/15/1995, 09/19/1995, 03/02/1996    HIB 10/17/1995, 01/09/1996, 03/02/1996, 08/21/1996    Hep A, Adult 01/07/2016    Hib, Unspecified Formulation 10/17/1995, 01/09/1996, 03/02/1996, 08/21/1996    IPV 10/17/1995, 10/17/1995, 01/09/1996, 01/09/1996, 03/02/1996, 03/02/1996, 09/20/1997, 09/20/1997, 04/26/2000, 04/26/2000    Influenza 11/13/2004, 10/17/2009, 11/03/2011, 10/16/2012    Influenza Virus Vaccine, H1N1 01/13/2010    MMR 08/21/1996, 08/21/1996, 04/26/2000, 04/26/2000    Meningococcal (Menomune) 05/09/2009    OPV 10/17/1995, 01/09/1996, 03/02/1996    TDAP 05/09/2009, 02/09/2019, 06/24/2024    Tb Intradermal Test 03/25/2016    Varicella 05/09/2009, 08/21/2010       Health Maintenance   Topic Date Due    COVID-19 Vaccine (4 - 2024-25 season) 09/01/2024    Influenza Vaccine (1) 10/01/2024    Annual Physical  11/08/2024    Pap Smear  11/08/2026    DTaP,Tdap,and Td Vaccines (9 - Td or Tdap) 06/24/2034    Annual Depression Screening  Completed    Pneumococcal Vaccine: Birth to 64yrs  Aged Out          Current Outpatient Medications   Medication Sig Dispense Refill    Pseudoephedrine-Ibuprofen (ADVIL COLD/SINUS OR) Take by mouth.      cholecalciferol 25 MCG (1000 UT) Oral Tab Take 1 tablet (1,000 Units total) by mouth daily.      amoxicillin 875 MG Oral Tab Take 1 tablet (875 mg total) by mouth 2 (two) times daily for 10 days. 20 tablet 0    azelastine 0.1 % Nasal Solution 1 spray by Nasal route 2 (two) times daily. 1 each 0    ibuprofen 600 MG Oral Tab Take 1 tablet (600 mg total) by mouth every 6 (six) hours as needed for Pain (for pain). (Patient not taking: Reported on 11/19/2024) 30 tablet 0    prenatal vitamin with DHA 27-0.8-228 MG Oral Cap Take 1 capsule by mouth daily.  (Patient not taking: Reported on 11/19/2024)       Allergies:Allergies[1]   PHYSICAL EXAM:     Chief Complaint   Patient presents with    Routine Physical    Nasal Congestion     And cough states everyone in her house has been sick on and off X 2 weeks       Physical Exam  Vitals and nursing note reviewed.   Constitutional:       Appearance: She is well-developed.   HENT:      Head: Normocephalic and atraumatic.      Right Ear: External ear normal.      Left Ear: External ear normal.      Nose: Congestion present.      Comments: Has ome sinus pressure this am      Mouth/Throat:      Pharynx: No oropharyngeal exudate.   Eyes:      General:         Right eye: No discharge.         Left eye: No discharge.      Conjunctiva/sclera: Conjunctivae normal.      Pupils: Pupils are equal, round, and reactive to light.   Neck:      Thyroid: No thyromegaly.   Cardiovascular:      Rate and Rhythm: Normal rate and regular rhythm.      Heart sounds: Normal heart sounds. No murmur heard.  Pulmonary:      Effort: Pulmonary effort is normal.      Breath sounds: Normal breath sounds. No wheezing.   Abdominal:      General: Bowel sounds are normal.      Palpations: Abdomen is soft. There is no mass.      Tenderness: There is no abdominal tenderness.   Musculoskeletal:         General: No tenderness.      Cervical back: Normal range of motion and neck supple.   Lymphadenopathy:      Cervical: No cervical adenopathy.   Skin:     General: Skin is dry.      Findings: No rash.   Neurological:      Mental Status: She is alert and oriented to person, place, and time.      Cranial Nerves: No cranial nerve deficit.      Motor: No abnormal muscle tone.      Coordination: Coordination normal.      Deep Tendon Reflexes: Reflexes are normal and symmetric. Reflexes normal.   Psychiatric:         Behavior: Behavior normal.         Thought Content: Thought content normal.         Judgment: Judgment normal.                ASSESSMENT/PLAN:     Return  yearly for physicals  Follow up with dentist every 6 months  Follow up with eye doctor yearly  Recommend aerobic exercise for at least 30mins 5 days a week  Yearly flu shot  Tetanus booster every 10 years (Tdap/ Td)  Labs ordered/ or reviewed if done prior to appointment     Encounter Diagnoses   Name Primary?    Well adult exam Yes    Nasal congestion     Prediabetes     Mild pre-eclampsia in third trimester (HCC)     Hashimoto's disease     Sinus congestion        1. Well adult exam    - CBC With Differential With Platelet; Future  - Comp Metabolic Panel (14); Future  - Lipid Panel; Future  - Hemoglobin A1C; Future    2. Nasal congestion  Nasal spray as directed  - azelastine 0.1 % Nasal Solution; 1 spray by Nasal route 2 (two) times daily.  Dispense: 1 each; Refill: 0    3. Prediabetes      4. Mild pre-eclampsia in third trimester (HCC)  Resolved     5. Hashimoto's disease  Seeing endo    6. Sinus congestion  Take medications as directed. Side effects/ risks discussed and patient verbalized understanding of plan. To follow up if symptoms worsen.  Push fluids  - amoxicillin 875 MG Oral Tab; Take 1 tablet (875 mg total) by mouth 2 (two) times daily for 10 days.  Dispense: 20 tablet; Refill: 0      Orders Placed This Encounter   Procedures    CBC With Differential With Platelet    Comp Metabolic Panel (14)    Lipid Panel    Hemoglobin A1C       The above note was creating using Dragon speech recognition technology. Please excuse any typos    Meds This Visit:  Requested Prescriptions     Signed Prescriptions Disp Refills    amoxicillin 875 MG Oral Tab 20 tablet 0     Sig: Take 1 tablet (875 mg total) by mouth 2 (two) times daily for 10 days.    azelastine 0.1 % Nasal Solution 1 each 0     Si spray by Nasal route 2 (two) times daily.       Imaging & Referrals:  None       ID#[id number]       [1] No Known Allergies

## 2024-12-05 ENCOUNTER — LAB ENCOUNTER (OUTPATIENT)
Dept: LAB | Age: 29
End: 2024-12-05
Attending: FAMILY MEDICINE
Payer: COMMERCIAL

## 2024-12-05 DIAGNOSIS — Z00.00 WELL ADULT EXAM: ICD-10-CM

## 2024-12-05 LAB
ALBUMIN SERPL-MCNC: 4.7 G/DL (ref 3.2–4.8)
ALBUMIN/GLOB SERPL: 1.3 {RATIO} (ref 1–2)
ALP LIVER SERPL-CCNC: 70 U/L
ALT SERPL-CCNC: 29 U/L
ANION GAP SERPL CALC-SCNC: 9 MMOL/L (ref 0–18)
AST SERPL-CCNC: 17 U/L (ref ?–34)
BASOPHILS # BLD AUTO: 0.06 X10(3) UL (ref 0–0.2)
BASOPHILS NFR BLD AUTO: 0.8 %
BILIRUB SERPL-MCNC: 0.5 MG/DL (ref 0.3–1.2)
BUN BLD-MCNC: 13 MG/DL (ref 9–23)
BUN/CREAT SERPL: 17.6 (ref 10–20)
CALCIUM BLD-MCNC: 10.3 MG/DL (ref 8.7–10.4)
CHLORIDE SERPL-SCNC: 105 MMOL/L (ref 98–112)
CHOLEST SERPL-MCNC: 102 MG/DL (ref ?–200)
CO2 SERPL-SCNC: 27 MMOL/L (ref 21–32)
CREAT BLD-MCNC: 0.74 MG/DL
DEPRECATED RDW RBC AUTO: 43.5 FL (ref 35.1–46.3)
EGFRCR SERPLBLD CKD-EPI 2021: 112 ML/MIN/1.73M2 (ref 60–?)
EOSINOPHIL # BLD AUTO: 0.25 X10(3) UL (ref 0–0.7)
EOSINOPHIL NFR BLD AUTO: 3.5 %
ERYTHROCYTE [DISTWIDTH] IN BLOOD BY AUTOMATED COUNT: 12.6 % (ref 11–15)
EST. AVERAGE GLUCOSE BLD GHB EST-MCNC: 114 MG/DL (ref 68–126)
FASTING PATIENT LIPID ANSWER: NO
FASTING STATUS PATIENT QL REPORTED: NO
GLOBULIN PLAS-MCNC: 3.5 G/DL (ref 2–3.5)
GLUCOSE BLD-MCNC: 109 MG/DL (ref 70–99)
HBA1C MFR BLD: 5.6 % (ref ?–5.7)
HCT VFR BLD AUTO: 41.2 %
HDLC SERPL-MCNC: 48 MG/DL (ref 40–59)
HGB BLD-MCNC: 13.6 G/DL
IMM GRANULOCYTES # BLD AUTO: 0.02 X10(3) UL (ref 0–1)
IMM GRANULOCYTES NFR BLD: 0.3 %
LDLC SERPL CALC-MCNC: 44 MG/DL (ref ?–100)
LYMPHOCYTES # BLD AUTO: 1.73 X10(3) UL (ref 1–4)
LYMPHOCYTES NFR BLD AUTO: 24.2 %
MCH RBC QN AUTO: 30.9 PG (ref 26–34)
MCHC RBC AUTO-ENTMCNC: 33 G/DL (ref 31–37)
MCV RBC AUTO: 93.6 FL
MONOCYTES # BLD AUTO: 0.47 X10(3) UL (ref 0.1–1)
MONOCYTES NFR BLD AUTO: 6.6 %
NEUTROPHILS # BLD AUTO: 4.63 X10 (3) UL (ref 1.5–7.7)
NEUTROPHILS # BLD AUTO: 4.63 X10(3) UL (ref 1.5–7.7)
NEUTROPHILS NFR BLD AUTO: 64.6 %
NONHDLC SERPL-MCNC: 54 MG/DL (ref ?–130)
OSMOLALITY SERPL CALC.SUM OF ELEC: 293 MOSM/KG (ref 275–295)
PLATELET # BLD AUTO: 216 10(3)UL (ref 150–450)
POTASSIUM SERPL-SCNC: 4.2 MMOL/L (ref 3.5–5.1)
PROT SERPL-MCNC: 8.2 G/DL (ref 5.7–8.2)
RBC # BLD AUTO: 4.4 X10(6)UL
SODIUM SERPL-SCNC: 141 MMOL/L (ref 136–145)
TRIGL SERPL-MCNC: 34 MG/DL (ref 30–149)
VLDLC SERPL CALC-MCNC: 5 MG/DL (ref 0–30)
WBC # BLD AUTO: 7.2 X10(3) UL (ref 4–11)

## 2024-12-05 PROCEDURE — 80053 COMPREHEN METABOLIC PANEL: CPT

## 2024-12-05 PROCEDURE — 85025 COMPLETE CBC W/AUTO DIFF WBC: CPT

## 2024-12-05 PROCEDURE — 36415 COLL VENOUS BLD VENIPUNCTURE: CPT

## 2024-12-05 PROCEDURE — 83036 HEMOGLOBIN GLYCOSYLATED A1C: CPT

## 2024-12-05 PROCEDURE — 80061 LIPID PANEL: CPT

## 2025-01-23 DIAGNOSIS — Z78.9 BREASTFEEDING (INFANT): Primary | ICD-10-CM

## 2025-04-20 ENCOUNTER — HOSPITAL ENCOUNTER (OUTPATIENT)
Age: 30
Discharge: HOME | End: 2025-04-20
Payer: COMMERCIAL

## 2025-04-20 ENCOUNTER — APPOINTMENT (OUTPATIENT)
Dept: GENERAL RADIOLOGY | Age: 30
End: 2025-04-20
Attending: NURSE PRACTITIONER
Payer: COMMERCIAL

## 2025-04-20 VITALS
HEART RATE: 61 BPM | RESPIRATION RATE: 16 BRPM | SYSTOLIC BLOOD PRESSURE: 130 MMHG | TEMPERATURE: 98 F | DIASTOLIC BLOOD PRESSURE: 67 MMHG | OXYGEN SATURATION: 100 %

## 2025-04-20 DIAGNOSIS — S99.922A INJURY OF TOE ON LEFT FOOT, INITIAL ENCOUNTER: Primary | ICD-10-CM

## 2025-04-20 DIAGNOSIS — S92.425A CLOSED NONDISPLACED FRACTURE OF DISTAL PHALANX OF LEFT GREAT TOE, INITIAL ENCOUNTER: ICD-10-CM

## 2025-04-20 PROCEDURE — 73660 X-RAY EXAM OF TOE(S): CPT | Performed by: NURSE PRACTITIONER

## 2025-04-20 PROCEDURE — 99213 OFFICE O/P EST LOW 20 MIN: CPT | Performed by: NURSE PRACTITIONER

## 2025-04-20 NOTE — ED PROVIDER NOTES
Patient Seen in: [facility]      History     Chief Complaint   Patient presents with    Toe Injury     Stated Complaint: Fracture, Minor  Subjective:   29-year-old female with no past medical history presents from home.  Patient is here with a left first toe injury.  Injury occurred 2 days ago.  Patient dropped a 45 pounds plate at the gym on the toe.  Isolated left first toe injury.  Pain increases with weightbearing and flexion.  States she has been having to walk on the outside of her foot due to the pain.  She has taken Advil for pain.  No bleeding or wounds.    The history is provided by the patient. No  was used.     Objective:   Past Medical History:    Asthma (HCC)    Morbid obesity with BMI of 40.0-44.9, adult (HCC)    PCOS (polycystic ovarian syndrome)    Pregnancy-induced hypertension (HCC)    Thyroid nodule    Vitamin D deficiency            Past Surgical History:   Procedure Laterality Date          Dermatological procedure      Facial abscess removed    Each add tooth extraction      no associated bleeding complications    Removal gallbladder  2010    Tonsillectomy                Social History     Socioeconomic History    Marital status:    Tobacco Use    Smoking status: Never    Smokeless tobacco: Never   Vaping Use    Vaping status: Never Used   Substance and Sexual Activity    Alcohol use: No     Alcohol/week: 0.0 standard drinks of alcohol    Drug use: No    Sexual activity: Yes     Partners: Male     Birth control/protection: Condom   Other Topics Concern    Caffeine Concern Yes     Comment: 1 cup soda/coffee per day   Social History Narrative    Mary is  x 3 yrs. Mother of 1 son. 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 Mary and her family live in [place], [place].     Social Drivers of Health     Food Insecurity: No Food Insecurity (2024)    Food Insecurity     Food Insecurity: Never true   Transportation Needs: No Transportation Needs  (8/30/2024)    Transportation Needs     Lack of Transportation: No     Car Seat: Yes   Stress: No Stress Concern Present (8/30/2024)    Stress     Feeling of Stress : No   Housing Stability: Low Risk  (8/30/2024)    Housing Stability     Housing Instability: No     Crib or Bassinette: Yes            Review of Systems    Positive for stated complaint: Toe Injury    Other systems are as noted in HPI.  Constitutional and vital signs reviewed.      All other systems reviewed and negative except as noted above.    Physical Exam     ED Triage Vitals [04/20/25 1330]   /67   Pulse 61   Resp 16   Temp 97.7 °F (36.5 °C)   Temp src Oral   SpO2 100 %   O2 Device None (Room air)     Current:/67   Pulse 61   Temp 97.7 °F (36.5 °C) (Oral)   Resp 16   LMP 04/01/2025   SpO2 100%     Physical Exam  Vitals and nursing note reviewed.   Constitutional:       General: She is not in acute distress.     Appearance: Normal appearance. She is not ill-appearing or toxic-appearing.   HENT:      Head: Normocephalic and atraumatic.      Nose: Nose normal.      Mouth/Throat:      Mouth: Mucous membranes are moist.      Pharynx: Oropharynx is clear.   Eyes:      Pupils: Pupils are equal, round, and reactive to light.   Cardiovascular:      Rate and Rhythm: Normal rate and regular rhythm.      Pulses: Normal pulses.   Pulmonary:      Effort: Pulmonary effort is normal. No respiratory distress.      Breath sounds: Normal breath sounds.      Comments: Lungs clear.  No adventitious lung sounds.  No distress.  No hypoxia.  Pulse ox 100% ra. Which is normal    Abdominal:      General: Abdomen is flat.      Palpations: Abdomen is soft.   Musculoskeletal:         General: No signs of injury. Normal range of motion.      Cervical back: Normal range of motion and neck supple.      Left foot: Normal range of motion and normal capillary refill. Swelling (Ecchymosis) and bony tenderness (Left first toe, distal) present. No deformity or  laceration. Normal pulse.   Skin:     General: Skin is warm and dry.      Capillary Refill: Capillary refill takes less than 2 seconds.   Neurological:      General: No focal deficit present.      Mental Status: She is alert and oriented to person, place, and time.      GCS: GCS eye subscore is 4. GCS verbal subscore is 5. GCS motor subscore is 6.   Psychiatric:         Mood and Affect: Mood normal.         Behavior: Behavior normal.         Thought Content: Thought content normal.         Judgment: Judgment normal.         ED Course   Radiology:  XR TOE(S) (MIN 2 VIEWS), LEFT 1ST (CPT=73660)  Result Date: 4/20/2025  CONCLUSION:  Acute nondisplaced intra-articular fracture of the base of the distal left 1st phalanx.    Dictated by (CST): Howie Sales MD on 4/20/2025 at 2:12 PM     Finalized by (CST): Howie Sales MD on 4/20/2025 at 2:14 PM          Labs Reviewed - No data to display    MDM     Medical Decision Making  Differential diagnoses reflecting the complexity of care include: Left first toe crush injury, toe fracture, contusion  Crush injury of the left first toe.  Swelling, ecchymosis, bony tenderness noted.  No open wounds.  No subungual hematoma.  No compartment syndrome  X-ray of the left first toe does show nondisplaced intra-articular fracture of the base of the distal left first phalanx  Findings discussed with patient.  Offered postop shoe and crutches.  Patient declined.    Plan of Care: Ibuprofen, ice, elevate.  Follow-up with podiatry, referral provided    Results and plan of care discussed with the patient/family. They are in agreement with discharge. They understand to follow up with their primary doctor or the referral physician for further evaluation, especially if no improvement.  Also discussed the limitations of immediate care, patient is aware that if symptoms are worse they should go to the emergency room. Verbal and written discharge instructions were given.     My independent  interpretation of studies of: Left first distal phalanx fracture noted at the base  Diagnostic tests and medications considered but not ordered were: Patient declined pain medication  Shared decision making was done by: Patient declined pain medication, postop shoe, crutches  Comorbidities that add complexity to management include: None  External chart review was done and was noted:  , breastfeeding  History obtained by an independent source was from: N/A   Discussions and management was done with: N/A  Social determinants of health that affect care: N/A              Problems Addressed:  Closed nondisplaced fracture of distal phalanx of left great toe, initial encounter: acute illness or injury  Injury of toe on left foot, initial encounter: acute illness or injury    Amount and/or Complexity of Data Reviewed  Radiology: ordered and independent interpretation performed. Decision-making details documented in ED Course.    Risk  OTC drugs.        Disposition and Plan     Clinical Impression:  1. Injury of toe on left foot, initial encounter    2. Closed nondisplaced fracture of distal phalanx of left great toe, initial encounter         Disposition:  Discharge  2025  2:17 pm    Follow-up:  Susie Lima DPM  [address] [place] [id number]  [phone]                Medications Prescribed:  Discharge Medication List as of 2025  2:24 PM

## 2025-05-12 ENCOUNTER — PATIENT MESSAGE (OUTPATIENT)
Dept: ENDOCRINOLOGY CLINIC | Facility: CLINIC | Age: 30
End: 2025-05-12

## 2025-06-03 ENCOUNTER — OFFICE VISIT (OUTPATIENT)
Dept: ENDOCRINOLOGY CLINIC | Facility: CLINIC | Age: 30
End: 2025-06-03

## 2025-06-03 VITALS
HEIGHT: 61 IN | WEIGHT: 205 LBS | BODY MASS INDEX: 38.71 KG/M2 | DIASTOLIC BLOOD PRESSURE: 70 MMHG | HEART RATE: 66 BPM | SYSTOLIC BLOOD PRESSURE: 112 MMHG

## 2025-06-03 DIAGNOSIS — E04.1 THYROID NODULE: Primary | ICD-10-CM

## 2025-06-03 PROCEDURE — 3078F DIAST BP <80 MM HG: CPT | Performed by: INTERNAL MEDICINE

## 2025-06-03 PROCEDURE — 3008F BODY MASS INDEX DOCD: CPT | Performed by: INTERNAL MEDICINE

## 2025-06-03 PROCEDURE — 99213 OFFICE O/P EST LOW 20 MIN: CPT | Performed by: INTERNAL MEDICINE

## 2025-06-03 PROCEDURE — 3074F SYST BP LT 130 MM HG: CPT | Performed by: INTERNAL MEDICINE

## 2025-06-03 NOTE — PROGRESS NOTES
Return Office Visit     CHIEF COMPLAINT:    Thyroid nodule   Hashimotos disease    HISTORY OF PRESENT ILLNESS:  Mary Chatman is a 29 year old female who presents for follow up for thyroid nodule  She was noted to have low TSH levels. NM thyroid scan and uptake showed a possible left hot nodule.  Recent thyroid US did not show any nodule on the left side, but showed a nodule on the right side  Right sided nodule: benign on FNA    Personal or Family history of thyroid cancer: denies  Personal or family history of MEN: denies  Exposure to head and neck radiation before age 20: denies  Compressive symptoms (difficulty in breathing or swallowing): denies  On anti coagulation or steroids: denies     Mother and Grand mother have hypothyroidism      Doing okay       CURRENT MEDICATION:    Current Outpatient Medications   Medication Sig Dispense Refill    Pseudoephedrine-Ibuprofen (ADVIL COLD/SINUS OR) Take by mouth.      cholecalciferol 25 MCG (1000 UT) Oral Tab Take 1 tablet (1,000 Units total) by mouth daily.      azelastine 0.1 % Nasal Solution 1 spray by Nasal route 2 (two) times daily. 1 each 0    ibuprofen 600 MG Oral Tab Take 1 tablet (600 mg total) by mouth every 6 (six) hours as needed for Pain (for pain). 30 tablet 0    prenatal vitamin with DHA 27-0.8-228 MG Oral Cap Take 1 capsule by mouth daily.           ALLERGY:  No Known Allergies    PAST MEDICAL, SOCIAL AND FAMILY HISTORY:  See past medical history marked as reviewed.  See past surgical history marked as reviewed.  See past family history marked as reviewed.  See past social history marked as reviewed.    ASSESSMENTS:     REVIEW OF SYSTEMS:  Constitutional: Negative for: weight change, fever,  fatigue, cold/heat intolerance  Eyes: Negative for:  Visual changes, proptosis, blurring  ENT: Negative for:  dysphagia, neck swelling, dysphonia  Respiratory: Negative for:  dyspnea, cough  Cardiovascular: Negative for:  chest pain, palpitations,  orthopnea  GI: Negative for:  abdominal pain,  nausea, vomiting, diarrhea, constipation, bleeding  Neurology: Negative for: headache, numbness, weakness  Genito-Urinary: Negative for: dysuria, frequency  Psychiatric: Negative for:  depression, anxiety  Hematology/Lymphatics: Negative for: bruising, lower extremity edema  Endocrine: Negative for: polyuria, polydypsia  Skin: Negative for: rash, blister, cellulitis,       PHYSICAL EXAM:     Vitals reviewed    General Appearance:  alert, well developed, in no acute distress  Head: Atraumatic  Eyes:  normal conjunctivae, sclera., normal sclera and normal pupils  Throat/Neck: normal sound to voice. Normal hearing, normal speech, no significant thyroid enlargement noted  Respiratory:  Speaking in full sentences, non-labored. no increased work of breathing, no audible wheezing    Neuro: motor grossly intact, moving all extremities without difficulty  Psychiatric:  oriented to time, self, and place    DATA:     Pertinent data reviewed    ASSESSMENT AND PLAN:    Patient is a 29 year old female who is :     1.  Right thyroid nodule    -Discussed common occurrence of thyroid nodules in the population approx 50-60% of the population  -Discussed risk of malignancy is approx 3-5%, 95-97% of nodules are benign  -Discussed recommendation to perform FNA biopsy of nodules greater than 1cm in size  -Discussed that if nodule is benign then plan to follow with yearly thyroid ultrasound    Right thyroid nodule has been stable.   No compressive symptoms  Will get a thyroid US    2. Hashimotos  She has positive thyroid AB  TSH in Oct 2024 normal   No symptoms of hypothyroidism  TSH ordered  She will call for results             RTC in 12 months  Call for results      Patient verbalized a complete  understanding of all of the above and did not have any further questions.       Mikayla Murcia MD

## (undated) DEVICE — EXTRA LARGE, DISPOSABLE C-SECTION PROTECTOR - RETRACTOR: Brand: ALEXIS ® O C-SECTION PROTECTOR - RETRACTOR

## (undated) NOTE — LETTER
[facility]  [address]  [phone]     Patient: Fiorella Rao   YOB: 1995   Date of Visit: 8/6/2021     Dear Employer,        August 6, 2021    At [facility], we are taking special precauti isolation and other precautions 10 days after the date of their first positive RT-PCR test for SARS-CoV-2 RNA.     Persons who are asymptomatic but have been exposed, CDC recommends 10 days of quarantine after exposure based on the time it takes to develop

## (undated) NOTE — LETTER
02/24/21    [facility]  [address] [place] [address]      Dear Aimee [facility],    [address] [facility] records indicate that you have outstanding lab work and or testing that was ordered for you and has not yet been completed:Please call Central Scheduling at [phone]

## (undated) NOTE — LETTER
Maternity Prescription Order Form    Name: Mary Chatman    Date: 2024    Email: [email or web address]    Telephone Information:   Home Phone [phone]   Mobile [phone]     Patient : 8/15/1995  Estimated Date of Delivery: 24      Breast Pumps Length of need 99    [x] Lactation Mother Z39.1   [x]  Electric Breast pump  [x] Accessories , , , , , , , ,       Compression Socks  Length of need 99    [] O22.01 Varicose Veins 1st Trimester  SIZE Below Knee 20-30 mmHg  [] O22.02 Varicose Veins 2nd Trimester  [] S Shoe Size 5-6  [] O22.03 Varicose Veins 3rd Trimester  [] M Shoe Size 6.5-10  [] R60.9 Edema, Unspecified   [] L Shoe Size 10.5-13  [] Other: _______________________  [] XL  Shoe Size 13.5-16      Sacroiliac Support  Length of need 99    [] M54.59  Low Back Pain   [] XS Pre Preg Pant size 00-0  [] M54.30  Sciatic Pain    [] S Pre Preg Pant size 2-4  [] M54.89  Back Pain    [] M Pre Preg Pant size 6-12        [] L Pre Preg Pant size 14-18        [] XL Pre Preg Pant size 20-26  Notes:        Physician Signature: _______ Date: 2024    Physician Name Printed & NPI: Manuela Sheehan DO [id number]

## (undated) NOTE — Clinical Note
Melly,I saw Aubrey Leyvato in clinic today for weight loss/management. I have recommended intensive lifestyle/behavioral modifications for weight loss. In addition, I have recommended she RTC one month for follow up.  She may decide to take medications for weight l

## (undated) NOTE — MR AVS SNAPSHOT
[facility] SPECIALTY [facility] - [place] [address] [place]  [address] [phone]               Thank you for choosing us for your health care visit with Elena Aguila DO.   We are glad to serve you and happy to provide you with this summary of If you have questions, you can call [phone] to talk to our [facility] Staff. Remember, [employer] is NOT to be used for urgent needs. For medical emergencies, dial 911. Visit [email or web address] to learn more.         Educational Infor

## (undated) NOTE — LETTER
AUTHORIZATION FOR SURGICAL OPERATION OR OTHER PROCEDURE    1. I hereby authorize Dr. WANG, and [facility] staff assigned to my case to perform the following operation and/or procedure at the [facility]:    ______________________________________________________________________________________________      _______________________________________________________________________________________________    2.  My physician has explained the nature and purpose of the operation or other procedure, possible alternative methods of treatment, the risks involved, and the possibility of complication to me.  I acknowledge that no guarantee has been made as to the result that may be obtained.  3.  I recognize that, during the course of this operation, or other procedure, unforseen conditions may necessitate additional or different procedure than those listed above.  I, therefore, further authorize and request that the above named physician, his/her physician assistants or designees perform such procedures as are, in his/her professional opinion, necessary and desirable.  4.  Any tissue or organs removed in the operation or other procedure may be disposed of by and at the discretion of the [facility] and [facility].  5.  I understand that in the event of a medical emergency, I will be transported by local paramedics to [facility] or other hospital emergency department.  6.  I certify that I have read and fully understand the above consent to operation and/or other procedure.    7.  I acknowledge that my physician has explained sedation/analgesia administration to me including the risks and benefits.  I consent to the administration of sedation/analgesia as may be necessary or desirable in the judgement of my physician.    Witness signature: ___________________________________________________ Date:  ______/______/_____                    Time:  ________ A.M.  P.M.       Patient  Name:  ______________________________________________________  (please print)      Patient signature:  ___________________________________________________             Relationship to Patient:           []  Parent    Responsible person                          []  Spouse  In case of minor or                    [] Other  _____________   Incompetent name:  __________________________________________________                               (please print)      _____________      Responsible person  In case of minor or  Incompetent signature:  _______________________________________________    Statement of Physician  My signature below affirms that prior to the time of the procedure, I have explained to the patient and/or his/her guardian, the risks and benefits involved in the proposed treatment and any reasonable alternative to the proposed treatment.  I have also explained the risks and benefits involved in the refusal of the proposed treatment and have answered the patient's questions.                        Date:  ______/______/_______  Provider                      Signature:  __________________________________________________________       Time:  ___________ A.M    P.M.

## (undated) NOTE — LETTER
[address] [place] [address]      Authorization for Surgical Operation and Procedure     Date:___________                                                                                                         Time:_______ 4.   Should the need arise during my operation or immediate post-operative period, I also consent to the administration of blood and/or blood products.   Further, I understand that despite careful testing and screening of blood or blood products by candelario 8.   I recognize that in the event my procedure results in extended X-Ray/fluoroscopy time, I may develop a skin reaction. 9.  If I have a Do Not Attempt Resuscitation (DNAR) order in place, that status will be suspended while in the operating room, proc STATEMENT OF PHYSICIAN My signature below affirms that prior to the time of the procedure; I have explained to the patient and/or his/her legal representative, the risks and benefits involved in the proposed treatment and any reasonable alternative to the

## (undated) NOTE — MR AVS SNAPSHOT
[facility] SPECIALTY [facility] [place] [address] [address]  [id number] [phone]               Thank you for choosing us for your health care visit with Adeola Joseph.  MD Sherri.  We are glad to serve you and happy to provide you with this summary of yo come in the form of a liquid, ointment, pad, or patch. It is available over the counter. · Cryotherapy.  Your healthcare provider puts liquid nitrogen on the wart with a cotton swab. This treatment can be painful. · Duct tape.  One study shows a benefit b children under 3years old. What side effects may I notice from receiving this medicine?   Side effects that you should report to your doctor or health care professional as soon as possible:  · allergic reactions like skin rash, itching or hives, swelling Use of this medicine in children under 12 years or in patients with kidney or liver disease may increase the risk of serious side effects. These patients should not use this medicine over large areas of skin.  If you notice symptoms such as nausea, vomiting

## (undated) NOTE — LETTER
3/17/2020              Marva Dach [place] [address]         Dear [facility],    [facility] records indicate that the tests ordered for you by Mode Momin MD  have not been done.   If you have, in fact, already completed the test

## (undated) NOTE — LETTER
08/12/21    Vanessa Ahuja  [address] [place] [address]      Dear Refugio Mcdonald,    [facility] records indicate that you have outstanding lab work and or testing that was ordered for you and has not yet been completed: Labs due after cleared from PCP.   Orders Pl

## (undated) NOTE — MR AVS SNAPSHOT
[address] [facility]  [phone]               Thank you for choosing us for your health care visit with Ann Salcido CNM.   We are glad to serve you and happy to provide you with this summary Visit [facility] online at  [email or web address]

## (undated) NOTE — MR AVS SNAPSHOT
[facility] SPECIALTY [facility] - [place] [address] [place]  [address] [phone]               Thank you for choosing us for your health care visit with Marian Bartlett.  MD Sherri.  We are glad to serve you and happy to provide you with this summary of yo ENDOCRINOLOGY - INTERNAL [[id number] CUSTOM]  Order #:  [id number]                  **REFERRAL REQUEST**    Your physician has referred you to a specialist.  Your physician or the clinic staff will provide you with the phone number you should call to jeaneth Lifestyle Modification Recommendations:    Modification Recommendation   Weight Reduction Maintain normal body weight (body mass index 18.5-24.9 kg/m2)   DASH eating plan Adopt a diet rich in fruits, vegetables, and low fat dairy products with reduced cont

## (undated) NOTE — LETTER
[facility]  Administration of Anesthesia  Mary MATHEW agree to be cared for by a physician anesthesiologist alone and/or with a nurse anesthetist, who is specially trained to monitor me and give me medicine to put me to sleep or keep me comfortable during my procedure    I understand that my anesthesiologist and/or anesthetist is not an employee or agent of [facility] or [employer]. He or she works for [facility]    As the patient asking for anesthesia services, I agree to:  Allow the anesthesiologist (anesthesia doctor) to give me medicine and do additional procedures as necessary. Some examples are: Starting or using an “IV” to give me medicine, fluids or blood during my procedure, and having a breathing tube placed to help me breathe when I’m asleep (intubation). In the event that my heart stops working properly, I understand that my anesthesiologist will make every effort to sustain my life, unless otherwise directed by [facility] Do Not Resuscitate documents.  Tell my anesthesia doctor before my procedure:  If I am pregnant.  The last time that I ate or drank.  iii. All of the medicines I take (including prescriptions, herbal supplements, and pills I can buy without a prescription (including street drugs/illegal medications). Failure to inform my anesthesiologist about these medicines may increase my risk of anesthetic complications.  iv.If I am allergic to anything or have had a reaction to anesthesia before.  I understand how the anesthesia medicine will help me (benefits).  I understand that with any type of anesthesia medicine there are risks:  The most common risks are: nausea, vomiting, sore throat, muscle soreness, damage to my eyes, mouth, or teeth (from breathing tube placement).  Rare risks include: remembering what happened during my procedure, allergic reactions to medications, injury to my airway, heart, lungs, vision, nerves, or  muscles and in extremely rare instances death.  My doctor has explained to me other choices available to me for my care (alternatives).  Pregnant Patients (“epidural”):  I understand that the risks of having an epidural (medicine given into my back to help control pain during labor), include itching, low blood pressure, difficulty urinating, headache or slowing of the baby’s heart. Very rare risks include infection, bleeding, seizure, irregular heart rhythms and nerve injury.  Regional Anesthesia (“spinal”, “epidural”, & “nerve blocks”):  I understand that rare but potential complications include headache, bleeding, infection, seizure, irregular heart rhythms, and nerve injury.    _____________________________________________________________________________  Patient (or Representative) Signature/Relationship to Patient  Date   Time    _____________________________________________________________________________   Name (if used)    Language/Organization   Time    _____________________________________________________________________________  Nurse Anesthetist Signature     Date   Time  _____________________________________________________________________________  Anesthesiologist Signature     Date   Time  I have discussed the procedure and information above with the patient (or patient’s representative) and answered their questions. The patient or their representative has agreed to have anesthesia services.    _____________________________________________________________________________  Witness        Date   Time  I have verified that the signature is that of the patient or patient’s representative, and that it was signed before the procedure  Patient Name: Mary Chatman     : 8/15/1995                 Printed: 2024 at 1:42 AM    Medical Record #: [id number]                                            Page 1 of 1  ----------ANESTHESIA CONSENT----------

## (undated) NOTE — LETTER
Maternity Prescription Order Form    Name: Mary Chatman    Date: 2024    Email: [email or web address]    Telephone Information:   Home Phone [phone]   Mobile [phone]     Patient : 8/15/1995  Estimated Date of Delivery: 24      Breast Pumps Length of need 99    [x] Lactation Mother Z39.1   [x]  Electric Breast pump  [x] Accessories , , , , , , , ,       Compression Socks  Length of need 99    [] O22.01 Varicose Veins 1st Trimester  SIZE Below Knee 20-30 mmHg  [] O22.02 Varicose Veins 2nd Trimester  [] S Shoe Size 5-6  [] O22.03 Varicose Veins 3rd Trimester  [] M Shoe Size 6.5-10  [] R60.9 Edema, Unspecified   [] L Shoe Size 10.5-13  [] Other: _______________________  [] XL  Shoe Size 13.5-16      Sacroiliac Support  Length of need 99    [] M54.59  Low Back Pain   [] XS Pre Preg Pant size 00-0  [] M54.30  Sciatic Pain    [] S Pre Preg Pant size 2-4  [] M54.89  Back Pain    [] M Pre Preg Pant size 6-12        [] L Pre Preg Pant size 14-18        [] XL Pre Preg Pant size 20-26  Notes:        Physician Signature: ___________________________ Date: 2024    Physician Name Printed & NPI: Manuela Sheehan DO [id number]

## (undated) NOTE — LETTER
10/7/2020              [place] [address]         Dear Rad Valente,    [facility] records indicate that the tests ordered for you by Kelin Mock MD  have not been done.   If you have, in fact, already completed the test

## (undated) NOTE — LETTER
[facility]  Trial of Labor After  Section(TOLAC)  Consent Form    PATIENT NAME: Mary Chatman     YOB: 1995  [1] I understand that I have had one or more prior  section(s).  [2] I understand that I have the option of undergoing an elective repeat  section or attempting a TOLAC.  [3] I understand that approximately 70% of women who undergo a TOLAC will be successful in delivering vaginally.  [4] I understand that the greatest risk during TOLAC is rupture of the uterus at the site of the previous uterine scar. The risk of uterine rupture during a TOLAC in someone such as myself, who has had a prior incision in the non-kellie part of my uterus, is around 1%.  [5] I understand the delivery is recommended no later than my due date, and that the likelihood of successful vaginal delivery goes down the farther I am past my due date.  [6] I understand that the risk of uterine rupture may increase if I need to be induced.  [7] I understand that TOLAC is associated with a higher risk of harm to my baby than to me.  [8] I understand that if my uterus ruptures during my TOLAC, there may not be sufficient time to operate and to prevent the death of or permanent brain injury to my baby.  [9] I understand that if my uterus ruptures during my TOLAC, I may experience life-threatening blood loss/hemorrhage, possibly requiring blood transfusions and/or hysterectomy.  [10] I understand that if I deliver vaginally, I most likely will have fewer problems after delivery and a shorter hospital stay than if I have a  delivery.  [11] I understand that if I choose a TOLAC and end up having a  delivery during labor, I have a greater risk of problems than if I had had an elective repeat .  [12] I understand that I may change my mind at any time during labor and decide to have a  section instead of continuing to labor.  [13] I have read or have had read  to me the above information and I understand it. Further, I have been provided with all of the information necessary to make an informed decision. My physician has described both delivery procedures and has advised me of the risks and benefits of each as defined above. I have been given the opportunity to ask questions. I understand that there are no guarantees as to maternal or fetal health regardless of which delivery method I select. By signing this form I am not only selecting the type of delivery, but also am attesting that this decision is voluntary and made with complete understanding on my part of the risks and benefits.    I WANT TO ATTEMPT A TOLAC ________________________________ Date________________    I WANT A REPEAT  ________________________________ Date________________   (sign your name next to your choice)    Physician’s Signature ___________________ Date __________________